# Patient Record
Sex: FEMALE | Race: WHITE | NOT HISPANIC OR LATINO | Employment: OTHER | URBAN - NONMETROPOLITAN AREA
[De-identification: names, ages, dates, MRNs, and addresses within clinical notes are randomized per-mention and may not be internally consistent; named-entity substitution may affect disease eponyms.]

---

## 2022-03-04 ENCOUNTER — OFFICE VISIT (OUTPATIENT)
Dept: FAMILY MEDICINE CLINIC | Age: 77
End: 2022-03-04

## 2022-03-04 ENCOUNTER — HOSPITAL ENCOUNTER (OUTPATIENT)
Dept: GENERAL RADIOLOGY | Facility: HOSPITAL | Age: 77
Discharge: HOME | End: 2022-03-04
Admitting: NURSE PRACTITIONER

## 2022-03-04 VITALS
HEIGHT: 64 IN | SYSTOLIC BLOOD PRESSURE: 133 MMHG | HEART RATE: 89 BPM | DIASTOLIC BLOOD PRESSURE: 82 MMHG | OXYGEN SATURATION: 98 % | TEMPERATURE: 98 F | BODY MASS INDEX: 28.54 KG/M2 | WEIGHT: 167.2 LBS

## 2022-03-04 DIAGNOSIS — M17.12 OSTEOARTHRITIS OF LEFT KNEE, UNSPECIFIED OSTEOARTHRITIS TYPE: ICD-10-CM

## 2022-03-04 DIAGNOSIS — M25.562 CHRONIC PAIN OF LEFT KNEE: ICD-10-CM

## 2022-03-04 DIAGNOSIS — G89.29 CHRONIC PAIN OF LEFT KNEE: ICD-10-CM

## 2022-03-04 DIAGNOSIS — M85.80 OSTEOPENIA, UNSPECIFIED LOCATION: ICD-10-CM

## 2022-03-04 DIAGNOSIS — F41.8 ANXIETY WITH DEPRESSION: ICD-10-CM

## 2022-03-04 DIAGNOSIS — I10 PRIMARY HYPERTENSION: Primary | ICD-10-CM

## 2022-03-04 DIAGNOSIS — M11.20 CHONDROCALCINOSIS: ICD-10-CM

## 2022-03-04 PROBLEM — K63.5 COLON POLYP: Status: ACTIVE | Noted: 2022-03-04

## 2022-03-04 PROBLEM — R01.1 CARDIAC MURMUR: Status: ACTIVE | Noted: 2022-03-04

## 2022-03-04 PROBLEM — E78.5 HYPERLIPIDEMIA: Status: ACTIVE | Noted: 2022-03-04

## 2022-03-04 PROBLEM — K44.9 HIATAL HERNIA: Status: ACTIVE | Noted: 2022-03-04

## 2022-03-04 PROBLEM — I35.0 AORTIC STENOSIS: Status: ACTIVE | Noted: 2022-03-04

## 2022-03-04 PROBLEM — H35.30 MACULAR DEGENERATION OF LEFT EYE: Status: ACTIVE | Noted: 2022-03-04

## 2022-03-04 PROBLEM — L98.9 PRECANCEROUS SKIN LESION: Status: ACTIVE | Noted: 2022-03-04

## 2022-03-04 PROCEDURE — 99204 OFFICE O/P NEW MOD 45 MIN: CPT | Performed by: NURSE PRACTITIONER

## 2022-03-04 PROCEDURE — 73560 X-RAY EXAM OF KNEE 1 OR 2: CPT

## 2022-03-04 RX ORDER — OXYBUTYNIN CHLORIDE 5 MG/1
5 TABLET ORAL DAILY
COMMUNITY
End: 2022-03-04 | Stop reason: SDDI

## 2022-03-04 RX ORDER — SIMVASTATIN 20 MG
TABLET ORAL
COMMUNITY
End: 2022-05-24 | Stop reason: DRUGHIGH

## 2022-03-04 RX ORDER — FLUOXETINE HYDROCHLORIDE 20 MG/1
20 CAPSULE ORAL DAILY
COMMUNITY
End: 2022-05-24 | Stop reason: SDUPTHER

## 2022-03-04 RX ORDER — HYDROXYZINE HYDROCHLORIDE 25 MG/1
12.5 TABLET, FILM COATED ORAL EVERY 8 HOURS PRN
COMMUNITY
End: 2022-05-24 | Stop reason: SDUPTHER

## 2022-03-04 RX ORDER — AMLODIPINE BESYLATE 5 MG/1
TABLET ORAL
COMMUNITY
End: 2022-05-24 | Stop reason: SDUPTHER

## 2022-03-04 RX ORDER — HYDROCHLOROTHIAZIDE 25 MG/1
25 TABLET ORAL DAILY
COMMUNITY
End: 2022-05-24 | Stop reason: SDUPTHER

## 2022-03-04 NOTE — PROGRESS NOTES
Chief Complaint  Pooja Chau presents to [facility] for Establish Care (BILATERAL KNEE ARTHROSCOPY) and Pain (L KNEE PAIN/SWELLING, PAIN SCALE: 8 WHEN IT HURTS)    Subjective          Recently moved from [facility]  Needs to establish care    Left knee with pain swelling/ pain with ambulation- pain is in superior aspect and lateral aspect of the knee  She did have arthroscopy in the knee 'years ago' in [place]  (records not available)    No injury to the knee has been using heat and ice only  No giving away of the knee,  No current use of brace.  She has never had her knee give away with her, mostly pain                Review of Systems      Allergies   Allergen Reactions   • Nsaids Swelling      History reviewed. No pertinent past medical history.  Current Outpatient Medications   Medication Sig Dispense Refill   • amLODIPine (NORVASC) 5 MG tablet amlodipine 5 mg tablet     • FLUoxetine (PROzac) 20 MG capsule fluoxetine 20 mg capsule     • hydroCHLOROthiazide (HYDRODIURIL) 25 MG tablet hydrochlorothiazide 25 mg tablet     • hydrOXYzine (ATARAX) 25 MG tablet hydroxyzine HCl 25 mg tablet     • simvastatin (ZOCOR) 20 MG tablet simvastatin 20 mg tablet       No current facility-administered medications for this visit.     Past Surgical History:   Procedure Laterality Date   • CERVICAL DISCECTOMY ANTERIOR  10/2018    c4-c7 with fusion   • HYSTERECTOMY      at age 39   with BLSO   • KNEE SURGERY Bilateral     2x/s in right   1 x in left    arthroscopy        Social History     Tobacco Use   • Smoking status: Never Smoker   • Smokeless tobacco: Never Used   Substance Use Topics   • Alcohol use: Yes     Comment: VERY RARELY/ONCE A MONTH   • Drug use: Not on file     Family History   Problem Relation Age of Onset   • Diabetes Mother         complications from DM   • Coronary artery disease Father    • Stroke Father    • Diabetes Maternal Grandmother    • Depression Sister    •  "Depression Brother      Health Maintenance Due   Topic Date Due   • DXA SCAN  Never done   • TDAP/TD VACCINES (1 - Tdap) Never done   • Pneumococcal Vaccine 65+ (1 of 1 - PPSV23) Never done   • ZOSTER VACCINE (2 of 2) 08/15/2020   • COVID-19 Vaccine (3 - Booster) 02/28/2022   • HEPATITIS C SCREENING  Never done   • ANNUAL WELLNESS VISIT  Never done   • LIPID PANEL  Never done      Immunization History   Administered Date(s) Administered   • COVID-19 (MODERNA) 1st, 2nd, 3rd Dose Only 09/21/2021, 09/30/2021   • Fluzone High Dose =>65 Years ([employer] ONLY) 09/16/2021   • Shingrix 06/20/2020        Objective     Vitals:    03/04/22 1048 03/04/22 1052   BP: 147/79 133/82   BP Location: Left arm Left arm   Patient Position: Sitting Sitting   Cuff Size: Adult Adult   Pulse: 98 89   Temp: 98 °F (36.7 °C)    TempSrc: Oral    SpO2: 98%    Weight: 75.8 kg (167 lb 3.2 oz)    Height: 162.6 cm (64\")      Body mass index is 28.7 kg/m².     Physical Exam  Constitutional:       General: She is not in acute distress.     Appearance: Normal appearance.   HENT:      Head: Normocephalic.   Cardiovascular:      Rate and Rhythm: Normal rate and regular rhythm.   Pulmonary:      Effort: Pulmonary effort is normal.      Breath sounds: Normal breath sounds.   Musculoskeletal:         General: Normal range of motion.      Left knee: Swelling and crepitus present. Tenderness present over the lateral joint line.   Neurological:      General: No focal deficit present.      Mental Status: She is alert and oriented to person, place, and time.   Psychiatric:         Mood and Affect: Mood normal.         Behavior: Behavior normal.           Result Review :     The following data was reviewed by: ANN Dash on 03/04/2022:  XR MARK KNEE 1 OR 2 VW L (03/04/2022)                          Assessment and Plan      Diagnoses and all orders for this visit:    1. Primary hypertension (Primary)  Assessment & Plan:  Hypertension is unchanged.  Continue " current treatment regimen.  Blood pressure will be reassessed at the next regular appointment.      2. Anxiety with depression  Assessment & Plan:  Patient's depression is single episode and is moderate without psychosis. Their depression is currently in full remission and the condition is unchanged. This will be reassessed at the next regular appointment. F/U as described:patient will continue current medication therapy.      3. Chronic pain of left knee  -     XR Knee 1 or 2 View Left; Future  -     Ambulatory Referral to Orthopedic Surgery    4. Chondrocalcinosis  -     Ambulatory Referral to Orthopedic Surgery    5. Osteoarthritis of left knee, unspecified osteoarthritis type  -     Ambulatory Referral to Orthopedic Surgery    6. Osteopenia, unspecified location  -     Ambulatory Referral to Orthopedic Surgery            Follow Up     Return if symptoms worsen or fail to improve, for pending records reveiw as to when need to follow up - 6 month minimum .

## 2022-03-04 NOTE — ASSESSMENT & PLAN NOTE
Patient's depression is single episode and is moderate without psychosis. Their depression is currently in full remission and the condition is unchanged. This will be reassessed at the next regular appointment. F/U as described:patient will continue current medication therapy.

## 2022-03-23 ENCOUNTER — OFFICE VISIT (OUTPATIENT)
Dept: CARDIOLOGY | Facility: CLINIC | Age: 77
End: 2022-03-23

## 2022-03-23 VITALS
HEIGHT: 64 IN | DIASTOLIC BLOOD PRESSURE: 76 MMHG | HEART RATE: 98 BPM | SYSTOLIC BLOOD PRESSURE: 138 MMHG | WEIGHT: 167 LBS | BODY MASS INDEX: 28.51 KG/M2

## 2022-03-23 DIAGNOSIS — I35.0 NONRHEUMATIC AORTIC VALVE STENOSIS: Primary | ICD-10-CM

## 2022-03-23 PROCEDURE — 93000 ELECTROCARDIOGRAM COMPLETE: CPT | Performed by: INTERNAL MEDICINE

## 2022-03-23 PROCEDURE — 99204 OFFICE O/P NEW MOD 45 MIN: CPT | Performed by: INTERNAL MEDICINE

## 2022-03-23 RX ORDER — CYCLOBENZAPRINE HCL 10 MG
1 TABLET ORAL EVERY 8 HOURS PRN
COMMUNITY
Start: 2021-12-18 | End: 2022-05-24 | Stop reason: SDUPTHER

## 2022-03-23 RX ORDER — OXYBUTYNIN CHLORIDE 5 MG/1
5 TABLET, EXTENDED RELEASE ORAL DAILY
COMMUNITY
End: 2022-05-24

## 2022-03-23 NOTE — PROGRESS NOTES
Pooja Chau  1945  Date of Office Visit: 03/23/22  Encounter Provider: Tanner Hester MD  Place of Service: [facility]      CHIEF COMPLAINT:  Aortic valve stenosis, severe. Asymptomatic  Essential hypertension  Hyperlipidemia    HISTORY OF PRESENT ILLNESS:  I had the pleasure of seeing Ms. Pooja Chau in consultation.  She is a 77-year-old female with a medical history of severe degenerative aortic valve stenosis that is asymptomatic, essential hypertension and hyperlipidemia who presents to me as a transfer of care.  She continues to maintain at least a moderate level of activity with no symptoms.  She denies any chest pain, dyspnea on exertion or presyncopal symptoms.  She has underwent transthoracic echocardiogram over the past few years and as of 2021 had severe aortic valve stenosis with a mean gradient of 41 mmHg across the aortic valve.  On my review of her catheterization and echocardiogram report this is confirmed.  She also has a normal left ventricular size and systolic function.  She had normal coronary arteries.    Review of Systems   Constitutional: Negative for fever and malaise/fatigue.   HENT: Negative for nosebleeds and sore throat.    Eyes: Negative for blurred vision and double vision.   Cardiovascular: Negative for chest pain, claudication, palpitations and syncope.   Respiratory: Negative for cough, shortness of breath and snoring.    Endocrine: Negative for cold intolerance, heat intolerance and polydipsia.   Skin: Negative for itching, poor wound healing and rash.   Musculoskeletal: Negative for joint pain, joint swelling, muscle weakness and myalgias.   Gastrointestinal: Negative for abdominal pain, melena, nausea and vomiting.   Neurological: Negative for light-headedness, loss of balance, seizures, vertigo and weakness.   Psychiatric/Behavioral: Negative for altered mental status and depression.       History reviewed. No  "pertinent past medical history.    The following portions of the patient's history were reviewed and updated as appropriate: Social history , Family history and Surgical history     Current Outpatient Medications on File Prior to Visit   Medication Sig Dispense Refill   • amLODIPine (NORVASC) 5 MG tablet amlodipine 5 mg tablet     • cyclobenzaprine (FLEXERIL) 10 MG tablet 1 tablet.     • FLUoxetine (PROzac) 20 MG capsule fluoxetine 20 mg capsule     • hydroCHLOROthiazide (HYDRODIURIL) 25 MG tablet hydrochlorothiazide 25 mg tablet     • hydrOXYzine (ATARAX) 25 MG tablet hydroxyzine HCl 25 mg tablet     • oxybutynin XL (DITROPAN-XL) 5 MG 24 hr tablet Take 5 mg by mouth Daily.     • simvastatin (ZOCOR) 20 MG tablet simvastatin 20 mg tablet       No current facility-administered medications on file prior to visit.       Allergies   Allergen Reactions   • Nsaids Swelling       Vitals:    03/23/22 1526   BP: 138/76   Pulse: 98   Weight: 75.8 kg (167 lb)   Height: 162.6 cm (64\")     Body mass index is 28.67 kg/m².   Constitutional:       Appearance: Well-developed.   Eyes:      General: No scleral icterus.     Conjunctiva/sclera: Conjunctivae normal.   HENT:      Head: Normocephalic and atraumatic.   Neck:      Thyroid: No thyromegaly.      Vascular: Normal carotid pulses. No carotid bruit, hepatojugular reflux or JVD.      Trachea: No tracheal deviation.   Pulmonary:      Effort: No respiratory distress.      Breath sounds: Normal breath sounds. No decreased breath sounds. No wheezing. No rhonchi. No rales.   Chest:      Chest wall: Not tender to palpatation.   Cardiovascular:      Normal rate. Regular rhythm.      Murmurs: There is a grade 3/6 mid to late systolic murmur.      No gallop.   Pulses:     Carotid: 2+ bilaterally.     Radial: 2+ bilaterally.     Femoral: 2+ bilaterally.     Dorsalis pedis: 2+ bilaterally.     Posterior tibial: 2+ bilaterally.  Abdominal:      General: Bowel sounds are normal. There is no " distension.      Palpations: Abdomen is soft.      Tenderness: There is no abdominal tenderness.   Musculoskeletal:         General: No deformity.      Cervical back: Normal range of motion and neck supple. Skin:     Findings: No erythema or rash.   Neurological:      Mental Status: Alert and oriented to person, place, and time.      Sensory: No sensory deficit.   Psychiatric:         Behavior: Behavior normal.              ECG 12 Lead    Date/Time: 3/23/2022 3:50 PM  Performed by: Tanner Hester MD  Authorized by: Tanner Hester MD   Comparison: compared with previous ECG from 12/11/2020  Similar to previous ECG  Rhythm: sinus rhythm  Ectopy: unifocal PVCs  Rate: normal  Conduction: right bundle branch block  QRS axis: normal                   DISCUSSION/SUMMARY  Very pleasant 77-year-old female with a medical history of severe degenerative aortic valve stenosis that is asymptomatic, hypertension and hyperlipidemia presents to me as a transfer of care.  She continues to maintain a least a moderate level of activity with an asymptomatic state.  She denies any chest pain or dyspnea on exertion.  She has had no presyncope or syncope.  Her blood pressure and heart rate are well controlled.    1.  Severe degenerative aortic valve stenosis: Preserved left ventricular ejection fraction and ventricular size.  This is asymptomatic.  -I have recommended continued monitoring.  My initial recommendation would be that she come back to see us in 6 months.  I would recommend repeating a transthoracic echocardiogram in 6 months when she comes in for her visit.  If she has critical AS at that time or evidence of left ventricular dysfunction or dilation we could consider valve replacement at that time.  -I encouraged her to call me if she has any symptoms of dyspnea, chest pain or lightheadedness.    2.  Essential hypertension: Reasonable control.  No change in current medical regimen of hydrochlorothiazide and  amlodipine.  No lower extremity edema reported.

## 2022-04-06 ENCOUNTER — OFFICE VISIT (OUTPATIENT)
Dept: ORTHOPEDIC SURGERY | Facility: CLINIC | Age: 77
End: 2022-04-06

## 2022-04-06 VITALS — WEIGHT: 166 LBS | HEIGHT: 64 IN | TEMPERATURE: 97.9 F | BODY MASS INDEX: 28.34 KG/M2

## 2022-04-06 DIAGNOSIS — M25.562 CHRONIC PAIN OF LEFT KNEE: Primary | ICD-10-CM

## 2022-04-06 DIAGNOSIS — G89.29 CHRONIC PAIN OF LEFT KNEE: Primary | ICD-10-CM

## 2022-04-06 DIAGNOSIS — F40.240 CLAUSTROPHOBIA: ICD-10-CM

## 2022-04-06 PROCEDURE — 99204 OFFICE O/P NEW MOD 45 MIN: CPT | Performed by: PHYSICIAN ASSISTANT

## 2022-04-06 RX ORDER — DIAZEPAM 5 MG/1
TABLET ORAL
Qty: 2 TABLET | Refills: 0 | Status: SHIPPED | OUTPATIENT
Start: 2022-04-06 | End: 2022-05-24

## 2022-04-06 RX ORDER — DIAZEPAM 5 MG/1
TABLET ORAL
COMMUNITY
End: 2022-04-06

## 2022-04-06 NOTE — PROGRESS NOTES
"Chief Complaint  Establish Care and Pain of the Left Knee    Subjective    History of Present Illness      Pooja Chau is a 77 y.o. female who presents to [facility] for complaint of Knee Pain:   Patient complains of left knee pain.   The pain began several years ago but has been worse and more constant in the 3 months.   The pain is located over patellar aspect.    She describes the symptoms as aching, dull and throbbing.   Symptoms improve with rest, heat, ice, cortisone injection.   The symptoms are worse with activity, stair climbing, walking.   The knee has given out or felt unstable.   She has had several prior knee arthroscopies over the last 10 years.      Objective   Vital Signs:   Temp 97.9 °F (36.6 °C)   Ht 162.6 cm (64\")   Wt 75.3 kg (166 lb)   BMI 28.49 kg/m²     Physical Exam  Vitals signs and nursing note reviewed.   Constitutional:       Appearance: Normal appearance.   Pulmonary:      Effort: Pulmonary effort is normal.   Skin:     General: Skin is warm and dry.      Capillary Refill: Capillary refill takes less than 2 seconds.   Neurological:      General: No focal deficit present.      Mental Status: He is alert and oriented to person, place, and time. Mental status is at baseline.   Psychiatric:         Mood and Affect: Mood normal.         Behavior: Behavior normal.         Thought Content: Thought content normal.         Judgment: Judgment normal.     Ortho Exam   LEFT knee  Positive patellar grind test with pain in the retropatellar region.    Positive for high Q-angle with patella tracking laterally in high grades of flexion.   Skin and soft tissues are swollen, consistent with inflammatory changes of the patellofemoral joint.    Positive for tenderness over the medial patellofemoral ligaments.   Quadriceps mechanism is well preserved.   Range of motion-Extension to Flexion: 0-120 degrees.  Negative apprehension sign.  Negative anterior and posterior " drawer. No medial or lateral instability noted.   Dorsalis pedis and posterior tibial artery pulses are palpable.   Common peroneal nerve function is well preserved.      Result Review :   Radiologic studies - see below for interpretation  LEFT knee xrays  nonweightbearing 2 views were performed at [facility] on 3/4/22. Images were independently viewed and interpreted by myself, my impression as follows:  · Moderate tricompartmental osteoarthritis with most prominent findings at the patellofemoral compartment.  There is chondrocalcinosis noted within the joint throughout.  Moderate joint effusion.        PROCEDURE  Procedures           Assessment   Assessment and Plan    Problem List Items Addressed This Visit        Musculoskeletal and Injuries    Chronic pain of left knee - Primary    Relevant Medications    diazePAM (Valium) 5 MG tablet    Other Relevant Orders    MRI Knee Left Without Contrast      Other Visit Diagnoses     Claustrophobia        Relevant Medications    diazePAM (Valium) 5 MG tablet          Follow Up   · Discussion of any imaging in detail. Discussion of orthopaedic goals.  · Risk, benefits, and merits of treatment alternatives reviewed with the patient. Treatment alternatives include: oral anti-inflammatories/NSAID, intra-articular steroid injection, intra-articular visco supplementation, surgery and further imaging/testing. She is thinking she wants to do more definitive treatment and proceed with the MRI and consider knee replacement surgery.  · Ice, heat, and/or modalities as beneficial  · To schedule MRI of left knee to evaluate the cartilage of the knee  · Patient is encouraged to call or return for any issues or concerns.  · Follow up will be based on when MRI has been performed  • Patient was given instructions and counseling regarding her condition or for health maintenance advice. Please see specific information pulled into the AVS if appropriate.     Eliseo Muñoz  UZIEL Fraga   Date of Encounter: 4/6/2022   Electronically signed by Eliseo Fraga PA-C, 04/06/22, 10:14 AM EDT.     EMR Dragon/Transcription disclaimer:  Much of this encounter note is an electronic transcription/translation of spoken language to printed text. The electronic translation of spoken language may permit erroneous, or at times, nonsensical words or phrases to be inadvertently transcribed; Although I have reviewed the note for such errors, some may still exist.

## 2022-05-06 ENCOUNTER — HOSPITAL ENCOUNTER (OUTPATIENT)
Dept: MRI IMAGING | Facility: HOSPITAL | Age: 77
Discharge: HOME | End: 2022-05-06
Admitting: PHYSICIAN ASSISTANT

## 2022-05-06 DIAGNOSIS — M25.562 CHRONIC PAIN OF LEFT KNEE: ICD-10-CM

## 2022-05-06 DIAGNOSIS — G89.29 CHRONIC PAIN OF LEFT KNEE: ICD-10-CM

## 2022-05-06 PROCEDURE — 73721 MRI JNT OF LWR EXTRE W/O DYE: CPT

## 2022-05-10 ENCOUNTER — TELEPHONE (OUTPATIENT)
Dept: ORTHOPEDIC SURGERY | Facility: CLINIC | Age: 77
End: 2022-05-10

## 2022-05-10 NOTE — TELEPHONE ENCOUNTER
LEFT VOICEMAIL FOR PATIENT THAT SHE HAS AN APPOINTMENT WITH KB MCDUFFIE PA-C ON 5/18/22 @ 8:15 AM TO GO OVER HER MRI RESULTS

## 2022-05-18 ENCOUNTER — OFFICE VISIT (OUTPATIENT)
Dept: ORTHOPEDIC SURGERY | Facility: CLINIC | Age: 77
End: 2022-05-18

## 2022-05-18 VITALS — WEIGHT: 164 LBS | HEIGHT: 64 IN | BODY MASS INDEX: 28 KG/M2

## 2022-05-18 DIAGNOSIS — M17.12 PRIMARY OSTEOARTHRITIS OF LEFT KNEE: Primary | ICD-10-CM

## 2022-05-18 PROCEDURE — 99214 OFFICE O/P EST MOD 30 MIN: CPT | Performed by: PHYSICIAN ASSISTANT

## 2022-05-18 RX ORDER — CEFAZOLIN SODIUM 2 G/100ML
2 INJECTION, SOLUTION INTRAVENOUS ONCE
Status: CANCELLED | OUTPATIENT
Start: 2022-10-03 | End: 2022-05-18

## 2022-05-18 NOTE — PROGRESS NOTES
"Chief Complaint  Follow-up, Edema, and Pain of the Right Knee    Subjective    History of Present Illness      Pooja Chau is a 77 y.o. female who presents to [facility] for follow up on left knee pain.   I initially saw her on 4/6/2022 for this complaint.  HPI from that visit is below:  The pain began several years ago but has been worse and more constant in the 3 months.   The pain is located over patellar aspect.    She describes the symptoms as aching, dull and throbbing.   Symptoms improve with rest, heat, ice, cortisone injection.   The symptoms are worse with activity, stair climbing, walking.   The knee has given out or felt unstable.   She has had several prior knee arthroscopies over the last 10 years.      Objective   Vital Signs:   Ht 162.6 cm (64\")   Wt 74.4 kg (164 lb)   BMI 28.15 kg/m²     Physical Exam  Vitals signs and nursing note reviewed.   Constitutional:       Appearance: Normal appearance.   Pulmonary:      Effort: Pulmonary effort is normal.   Skin:     General: Skin is warm and dry.      Capillary Refill: Capillary refill takes less than 2 seconds.   Neurological:      General: No focal deficit present.      Mental Status: He is alert and oriented to person, place, and time. Mental status is at baseline.   Psychiatric:         Mood and Affect: Mood normal.         Behavior: Behavior normal.         Thought Content: Thought content normal.         Judgment: Judgment normal.     Ortho Exam   LEFT knee  Positive patellar grind test with pain in the retropatellar region.    Positive for high Q-angle with patella tracking laterally in high grades of flexion.   Skin and soft tissues are swollen, consistent with inflammatory changes of the patellofemoral joint.    Positive for tenderness over the medial patellofemoral ligaments.   Quadriceps mechanism is well preserved.   Range of motion-Extension to Flexion: 0-120 degrees.  Negative apprehension " sign.  Negative anterior and posterior drawer. No medial or lateral instability noted.   Dorsalis pedis and posterior tibial artery pulses are palpable.   Common peroneal nerve function is well preserved.      Result Review :   Radiologic studies - see below for interpretation   Reviewed MRI report of left knee, performed at  [facility] on 5/6/22, summary of impression below:   · Compartment shows moderate thinning of the articular cartilage.  Medial femoral condyles consistent with area of full-thickness cartilage loss  · Body and posterior horn of medial meniscus have truncated appearance and horizontal oblique tear.  Body of medial meniscus is partially extruded from the joint.  · Lateral compartment shows mild thinning of the articular cartilage with lateral meniscus intact  · Dorsal patellar cartilage shows marked thinning and areas of marrow edema in the dorsal patellar are consistent with full-thickness cartilage loss      LEFT knee xrays  nonweightbearing 2 views were performed at [facility] on 3/4/22. Images were independently viewed and interpreted by myself, my impression as follows:  · Moderate tricompartmental osteoarthritis with most prominent findings at the patellofemoral compartment.  There is chondrocalcinosis noted within the joint throughout.  Moderate joint effusion.        PROCEDURE  Procedures           Assessment   Assessment and Plan    Diagnoses and all orders for this visit:    1. Primary osteoarthritis of left knee (Primary)  -     COVID PRE-OP / PRE-PROCEDURE SCREENING ORDER (NO ISOLATION) - Swab, Nasopharynx; Future  -     External [facility] Surgical/Procedural Request  -     Follow Anesthesia Guidelines / Protocol; Future  -     Obtain informed consent  -     Urinalysis With Culture If Indicated -; Future  -     Care Order / Instruction for all Female Patients: Clean Catch Urinalysis with culture if indicated if patient symptomatic:  dysuria, flank or lower abdominal pain, burning, urgency or frequency         Follow Up   · Discussion of any imaging in detail. Discussion of orthopaedic goals.  · Risk, benefits, and merits of treatment alternatives reviewed with the patient. Treatment alternatives include: oral anti-inflammatories/NSAID, intra-articular steroid injection, intra-articular visco supplementation, surgery. She would like to proceed with total knee arthroplasty of left knee.   The patient was seen today for preoperative discussion.  The patient has been tried on over-the-counter and prescription NSAID's despite the risks of anti-inflammatory bleeding, peptic ulcers and erosive gastritis with short term benefit only.  Braces have been prescribed for mechanical support.  Patient has been participating in an exercise program specifically targeting joint pain relief with limited benefit. Intraarticular injections have been used periodically with some but not complete relief of pain.  Ambulation aids have also been utilized.    · The details of the surgical procedure were explained including the location of probable incisions and a description of the likely hardware/grafts to be used. The patient understands the likely convalescence after surgery as well as the rehabilitation required.  Also, we have thoroughly discussed with the patient the risks, benefits and alternatives to surgery.  Risks include but are not limited to the risk of infection, joint stiffness, limited range of motion, wound healing problems, scar tissue build up, myocardial infarction, stroke, blood clots (including DVT and/or pulmonary embolus along with the risk of death) neurologic and/or vascular injury, limb length discrepancy, fracture, dislocation, nonunion, malunion, continued pain and need for further surgery including hardware failure requiring revision.    · Ice, heat, and/or modalities as beneficial  · Patient is encouraged to call or return for any issues or  concerns.  • Patient was given instructions and counseling regarding her condition or for health maintenance advice. Please see specific information pulled into the AVS if appropriate.     Eliseo Fraga PA-C   Date of Encounter: 5/18/2022   Electronically signed by Eliseo Fraga PA-C, 05/18/22, 8:57 AM EDT.     EMR Dragon/Transcription disclaimer:  Much of this encounter note is an electronic transcription/translation of spoken language to printed text. The electronic translation of spoken language may permit erroneous, or at times, nonsensical words or phrases to be inadvertently transcribed; Although I have reviewed the note for such errors, some may still exist.

## 2022-05-24 ENCOUNTER — OFFICE VISIT (OUTPATIENT)
Dept: FAMILY MEDICINE CLINIC | Age: 77
End: 2022-05-24

## 2022-05-24 VITALS
HEIGHT: 64 IN | HEART RATE: 76 BPM | WEIGHT: 168.6 LBS | OXYGEN SATURATION: 98 % | DIASTOLIC BLOOD PRESSURE: 72 MMHG | BODY MASS INDEX: 28.79 KG/M2 | SYSTOLIC BLOOD PRESSURE: 129 MMHG

## 2022-05-24 DIAGNOSIS — R73.09 ELEVATED GLUCOSE: ICD-10-CM

## 2022-05-24 DIAGNOSIS — I10 PRIMARY HYPERTENSION: ICD-10-CM

## 2022-05-24 DIAGNOSIS — E78.5 HYPERLIPIDEMIA, UNSPECIFIED HYPERLIPIDEMIA TYPE: ICD-10-CM

## 2022-05-24 DIAGNOSIS — R32 URINARY INCONTINENCE, UNSPECIFIED TYPE: ICD-10-CM

## 2022-05-24 DIAGNOSIS — G89.29 CHRONIC PAIN OF LEFT KNEE: ICD-10-CM

## 2022-05-24 DIAGNOSIS — M25.562 CHRONIC PAIN OF LEFT KNEE: ICD-10-CM

## 2022-05-24 DIAGNOSIS — F41.8 ANXIETY WITH DEPRESSION: ICD-10-CM

## 2022-05-24 DIAGNOSIS — R74.8 ELEVATED ALKALINE PHOSPHATASE LEVEL: ICD-10-CM

## 2022-05-24 DIAGNOSIS — H61.92 SKIN LESION OF LEFT EAR: ICD-10-CM

## 2022-05-24 DIAGNOSIS — F33.2 RECURRENT SEASONAL MAJOR DEPRESSION, SEVERE: Primary | ICD-10-CM

## 2022-05-24 PROCEDURE — 99214 OFFICE O/P EST MOD 30 MIN: CPT | Performed by: NURSE PRACTITIONER

## 2022-05-24 RX ORDER — AMLODIPINE BESYLATE 5 MG/1
5 TABLET ORAL DAILY
Qty: 90 TABLET | Refills: 1 | Status: SHIPPED | OUTPATIENT
Start: 2022-05-24 | End: 2023-01-23

## 2022-05-24 RX ORDER — AMLODIPINE BESYLATE 5 MG/1
TABLET ORAL
Status: CANCELLED | OUTPATIENT
Start: 2022-05-24

## 2022-05-24 RX ORDER — HYDROXYZINE HYDROCHLORIDE 25 MG/1
12.5 TABLET, FILM COATED ORAL EVERY 8 HOURS PRN
Qty: 90 TABLET | Refills: 1 | Status: SHIPPED | OUTPATIENT
Start: 2022-05-24

## 2022-05-24 RX ORDER — HYDROXYZINE HYDROCHLORIDE 25 MG/1
TABLET, FILM COATED ORAL
Status: CANCELLED | OUTPATIENT
Start: 2022-05-24

## 2022-05-24 RX ORDER — FLUOXETINE HYDROCHLORIDE 20 MG/1
20 CAPSULE ORAL DAILY
Qty: 90 CAPSULE | Refills: 1 | Status: SHIPPED | OUTPATIENT
Start: 2022-05-24 | End: 2023-01-16

## 2022-05-24 RX ORDER — CYCLOBENZAPRINE HCL 10 MG
10 TABLET ORAL EVERY 8 HOURS PRN
Qty: 90 TABLET | Refills: 1 | Status: SHIPPED | OUTPATIENT
Start: 2022-05-24

## 2022-05-24 RX ORDER — SIMVASTATIN 20 MG
TABLET ORAL
Qty: 90 TABLET | OUTPATIENT
Start: 2022-05-24

## 2022-05-24 RX ORDER — HYDROCHLOROTHIAZIDE 25 MG/1
25 TABLET ORAL DAILY
Qty: 90 TABLET | Refills: 1 | Status: SHIPPED | OUTPATIENT
Start: 2022-05-24 | End: 2022-12-15

## 2022-05-24 RX ORDER — HYDROCHLOROTHIAZIDE 25 MG/1
TABLET ORAL
Status: CANCELLED | OUTPATIENT
Start: 2022-05-24

## 2022-05-24 RX ORDER — FLUOXETINE HYDROCHLORIDE 40 MG/1
40 CAPSULE ORAL DAILY PRN
COMMUNITY
Start: 2022-05-24

## 2022-05-24 RX ORDER — SIMVASTATIN 40 MG
40 TABLET ORAL NIGHTLY
COMMUNITY
Start: 2022-05-24 | End: 2022-05-24 | Stop reason: SDUPTHER

## 2022-05-24 RX ORDER — SIMVASTATIN 40 MG
40 TABLET ORAL NIGHTLY
Qty: 90 TABLET | Refills: 1 | Status: SHIPPED | OUTPATIENT
Start: 2022-05-24 | End: 2022-07-12 | Stop reason: SDUPTHER

## 2022-05-24 NOTE — TELEPHONE ENCOUNTER
Caller: Pooja Chau    Relationship: Self    Best call back number: [phone]    Requested Prescriptions:     Requested Prescriptions     Pending Prescriptions Disp Refills   • amLODIPine (NORVASC) 5 MG tablet     • hydroCHLOROthiazide (HYDRODIURIL) 25 MG tablet     • simvastatin (ZOCOR) 20 MG tablet 90 tablet    • hydrOXYzine (ATARAX) 25 MG tablet          Pharmacy where request should be sent: [facility] #[id number] - [place], [place] - [address] AT [facility] [address] - [phone] [facility] [phone] FX         Does the patient have less than a 3 day supply:  [x] Yes  [] No    Alejandrina Lucas Rep   05/24/22 11:41 EDT

## 2022-05-24 NOTE — TELEPHONE ENCOUNTER
Caller: Pooja Chau    Relationship: Self    Best call back number: [phone]    What medication are you requesting: BLADDER CONTROL MEDICATION       What are your current symptoms: FREQUENT URINATION, NO CONTROL, 4 TIMES PER DAY SHE SOAKS HERSELF    How long have you been experiencing symptoms: AWHILE MORE FREQUENTLY LATELY    Have you had these symptoms before:    [x] Yes  [] No    Have you been treated for these symptoms before:   [x] Yes  [] No    If a prescription is needed, what is your preferred pharmacy and phone number: [facility] #[id number] - [place], [place] - [address] AT [facility] [address]  - [phone]  - [phone] FX

## 2022-05-24 NOTE — PROGRESS NOTES
Assessment and Plan    Diagnoses and all orders for this visit:    1. Recurrent seasonal major depression, severe (HCC) (Primary)  Comments:  Continue current treatment as recommended refills provided today follow-up in 6 months  Orders:  -     FLUoxetine (PROzac) 20 MG capsule; Take 1 capsule by mouth Daily. To take for seasonal depression. Once season depression has subsided, pt goes back down to 20mg daily.  Dispense: 90 capsule; Refill: 1    2. Primary hypertension  Comments:  Continue current treatment, blood pressure well controlled today follow-up in 6 months  Orders:  -     hydroCHLOROthiazide (HYDRODIURIL) 25 MG tablet; Take 1 tablet by mouth Daily.  Dispense: 90 tablet; Refill: 1  -     amLODIPine (NORVASC) 5 MG tablet; Take 1 tablet by mouth Daily.  Dispense: 90 tablet; Refill: 1  -     Comprehensive metabolic panel; Future    3. Anxiety with depression  Comments:  Continue current treatment at next scheduled appointment and as needed  Orders:  -     hydrOXYzine (ATARAX) 25 MG tablet; Take 0.5 tablets by mouth Every 8 (Eight) Hours As Needed for Anxiety.  Dispense: 90 tablet; Refill: 1    4. Chronic pain of left knee  Comments:  Follow-up with orthopedics as recommended  Orders:  -     cyclobenzaprine (FLEXERIL) 10 MG tablet; Take 1 tablet by mouth Every 8 (Eight) Hours As Needed for Muscle Spasms.  Dispense: 90 tablet; Refill: 1    5. Hyperlipidemia, unspecified hyperlipidemia type  Comments:  Continue current treatment follow-up in 6 months labs with next visit  Orders:  -     simvastatin (ZOCOR) 40 MG tablet; Take 1 tablet by mouth Every Night.  Dispense: 90 tablet; Refill: 1  -     Lipid panel; Future    6. Skin lesion of left ear  Comments:  I advised her to keep a watch on this area in her ear and we will have her follow-up with dermatology after her knee surgery for skin check    7. Urinary incontinence, unspecified type  Comments:  We discussed bladder training, scheduled bathroom, however she  will pursue additional recommendations with urology after knee surgery        Follow Up   Return in about 6 months (around 11/24/2022) for Recheck, Medicare Wellness.    Chief Complaint  Pooja Chau presents to [facility] for Hypertension, Hyperlipidemia, Anxiety, and ADVANCE DIRECTIVE (PT STATES SHE HAS AN ADVANCE DIRECTIVE. WE DO NOT HAVE ON FILE. ASKED PT TO BRING TO NEXT APPT.)    Subjective          Pooja presents for follow up on hypertension.  Compliance with medication is reported as good   Check of BP at home reported as well controlled.  No new concerns or problems to report.  Taking amlodipine and HCTZ    Pooja is here for follow up on depression.  She is reporting that Her medication is working well without side effects.  Current treatment includes prozac 20mg  But will increase 40mg in the summer as she has seasonal depression which usually flares up in the summer months.  She does take hydroxyzine as needed for panic attacks and anxiety.    Regarding her urinary incontinence, Ms. Chau reports that she has stopped taking all of her bladder medication due to side effects.  She has been on oxybutynin as well as Myrbetriq.  She did have an evaluation in the past by urology however this has been several years back.  She was told at some point that she did have slight bladder prolapse and had offered bladder sling however she declined at that time.  She reports that she is having to wear several undergarments to help with her incontinence as the urgency and overflow incontinence is gotten extreme.  She does drink green tea regularly.  She is interested in pursuing additional treatment, recommendations after her knee surgery.    Pooja is pending the surgery with Dr. Salcedo in the upcoming months.          Review of Systems    Objective     Vitals:    05/24/22 1539   BP: 129/72   Pulse: 76   SpO2: 98%   Weight: 76.5 kg (168 lb 9.6 oz)   Height: 162.6 cm  "(64.02\")     Body mass index is 28.93 kg/m².     Physical Exam  Constitutional:       General: She is not in acute distress.     Appearance: Normal appearance.   HENT:      Head: Normocephalic.   Cardiovascular:      Rate and Rhythm: Normal rate and regular rhythm.   Pulmonary:      Effort: Pulmonary effort is normal.      Breath sounds: Normal breath sounds.   Musculoskeletal:         General: Normal range of motion.   Neurological:      General: No focal deficit present.      Mental Status: She is alert and oriented to person, place, and time.   Psychiatric:         Mood and Affect: Mood normal.         Behavior: Behavior normal.         Result Review :                    Allergies   Allergen Reactions   • Nsaids Swelling   • Codeine Nausea Only   • Hydrocodone-Acetaminophen Other (See Comments)      Past Medical History:   Diagnosis Date   • Aortic stenosis    • Back pain    • Bronchitis    • Claustrophobia    • Heart disease    • Hypertension    • Pneumonia      Current Outpatient Medications   Medication Sig Dispense Refill   • amLODIPine (NORVASC) 5 MG tablet Take 1 tablet by mouth Daily. 90 tablet 1   • cyclobenzaprine (FLEXERIL) 10 MG tablet Take 1 tablet by mouth Every 8 (Eight) Hours As Needed for Muscle Spasms. 90 tablet 1   • FLUoxetine (PROzac) 20 MG capsule Take 1 capsule by mouth Daily. To take for seasonal depression. Once season depression has subsided, pt goes back down to 20mg daily. 90 capsule 1   • FLUoxetine (PROzac) 40 MG capsule Take 40 mg by mouth Daily As Needed. To take for seasonal depression. Once season depression has subsided, pt goes back down to 20mg daily.     • hydroCHLOROthiazide (HYDRODIURIL) 25 MG tablet Take 1 tablet by mouth Daily. 90 tablet 1   • hydrOXYzine (ATARAX) 25 MG tablet Take 0.5 tablets by mouth Every 8 (Eight) Hours As Needed for Anxiety. 90 tablet 1   • simvastatin (ZOCOR) 40 MG tablet Take 1 tablet by mouth Every Night. 90 tablet 1     No current " facility-administered medications for this visit.     Past Surgical History:   Procedure Laterality Date   • CERVICAL DISCECTOMY ANTERIOR  10/2018    c4-c7 with fusion   • HYSTERECTOMY      at age 39   with BLSO   • KNEE SURGERY Bilateral     2x/s in right   1 x in left    arthroscopy     • SHOULDER SURGERY Left       Social History     Tobacco Use   • Smoking status: Never Smoker   • Smokeless tobacco: Never Used   Vaping Use   • Vaping Use: Never used   Substance Use Topics   • Alcohol use: Yes     Comment: VERY RARELY/ONCE A MONTH/socially   • Drug use: Never     Family History   Problem Relation Age of Onset   • Diabetes Mother         complications from DM   • Coronary artery disease Father    • Stroke Father    • Diabetes Maternal Grandmother    • Depression Sister    • Depression Brother      Health Maintenance Due   Topic Date Due   • DXA SCAN  Never done   • TDAP/TD VACCINES (1 - Tdap) Never done   • Pneumococcal Vaccine 65+ (1 - PCV) Never done   • ZOSTER VACCINE (2 of 2) 08/15/2020   • HEPATITIS C SCREENING  Never done   • ANNUAL WELLNESS VISIT  Never done   • LIPID PANEL  Never done      Immunization History   Administered Date(s) Administered   • COVID-19 (MODERNA) 1st, 2nd, 3rd Dose Only 09/21/2021, 09/30/2021   • COVID-19 (UNSPECIFIED) 03/18/2022   • Fluzone High Dose =>65 Years ([employer]ax[employer] ONLY) 09/16/2021   • Shingrix 06/20/2020

## 2022-05-25 ENCOUNTER — LAB (OUTPATIENT)
Dept: LAB | Facility: HOSPITAL | Age: 77
End: 2022-05-25

## 2022-05-25 DIAGNOSIS — M17.12 PRIMARY OSTEOARTHRITIS OF LEFT KNEE: ICD-10-CM

## 2022-05-25 DIAGNOSIS — E78.5 HYPERLIPIDEMIA, UNSPECIFIED HYPERLIPIDEMIA TYPE: ICD-10-CM

## 2022-05-25 DIAGNOSIS — I10 PRIMARY HYPERTENSION: ICD-10-CM

## 2022-05-25 LAB
ALBUMIN SERPL-MCNC: 4 G/DL (ref 3.5–5.2)
ALBUMIN/GLOB SERPL: 1.4 G/DL
ALP SERPL-CCNC: 133 U/L (ref 39–117)
ALT SERPL W P-5'-P-CCNC: 19 U/L (ref 1–33)
ANION GAP SERPL CALCULATED.3IONS-SCNC: 9 MMOL/L (ref 5–15)
AST SERPL-CCNC: 21 U/L (ref 1–32)
BACTERIA UR QL AUTO: ABNORMAL /HPF
BILIRUB SERPL-MCNC: 0.3 MG/DL (ref 0–1.2)
BILIRUB UR QL STRIP: NEGATIVE
BUN SERPL-MCNC: 13 MG/DL (ref 8–23)
BUN/CREAT SERPL: 16.7 (ref 7–25)
CALCIUM SPEC-SCNC: 9.8 MG/DL (ref 8.6–10.5)
CHLORIDE SERPL-SCNC: 103 MMOL/L (ref 98–107)
CHOLEST SERPL-MCNC: 184 MG/DL (ref 0–200)
CLARITY UR: CLEAR
CO2 SERPL-SCNC: 30 MMOL/L (ref 22–29)
COLOR UR: YELLOW
CREAT SERPL-MCNC: 0.78 MG/DL (ref 0.57–1)
EGFRCR SERPLBLD CKD-EPI 2021: 77.9 ML/MIN/1.73
GLOBULIN UR ELPH-MCNC: 2.9 GM/DL
GLUCOSE SERPL-MCNC: 122 MG/DL (ref 65–99)
GLUCOSE UR STRIP-MCNC: NEGATIVE MG/DL
HDLC SERPL-MCNC: 67 MG/DL (ref 40–60)
HGB UR QL STRIP.AUTO: ABNORMAL
KETONES UR QL STRIP: NEGATIVE
LDLC SERPL CALC-MCNC: 105 MG/DL (ref 0–100)
LDLC/HDLC SERPL: 1.56 {RATIO}
LEUKOCYTE ESTERASE UR QL STRIP.AUTO: NEGATIVE
MUCOUS THREADS URNS QL MICRO: ABNORMAL /HPF
NITRITE UR QL STRIP: NEGATIVE
PH UR STRIP.AUTO: 6 [PH] (ref 5–8)
POTASSIUM SERPL-SCNC: 3.8 MMOL/L (ref 3.5–5.2)
PROT SERPL-MCNC: 6.9 G/DL (ref 6–8.5)
PROT UR QL STRIP: NEGATIVE
RBC # UR STRIP: ABNORMAL /HPF
REF LAB TEST METHOD: ABNORMAL
SODIUM SERPL-SCNC: 142 MMOL/L (ref 136–145)
SP GR UR STRIP: 1.02 (ref 1–1.03)
SQUAMOUS #/AREA URNS HPF: ABNORMAL /HPF
TRIGL SERPL-MCNC: 63 MG/DL (ref 0–150)
UROBILINOGEN UR QL STRIP: ABNORMAL
VLDLC SERPL-MCNC: 12 MG/DL (ref 5–40)
WBC # UR STRIP: ABNORMAL /HPF

## 2022-05-25 PROCEDURE — 80053 COMPREHEN METABOLIC PANEL: CPT

## 2022-05-25 PROCEDURE — 80061 LIPID PANEL: CPT

## 2022-05-25 PROCEDURE — 36415 COLL VENOUS BLD VENIPUNCTURE: CPT

## 2022-05-25 PROCEDURE — 81001 URINALYSIS AUTO W/SCOPE: CPT

## 2022-07-07 ENCOUNTER — TELEPHONE (OUTPATIENT)
Dept: FAMILY MEDICINE CLINIC | Age: 77
End: 2022-07-07

## 2022-07-07 NOTE — TELEPHONE ENCOUNTER
Caller: YASH    Relationship: Other    Best call back number: [phone]    What form or medical record are you requesting: MEDICAL RECORDS- EKG FROM 3 MONTHS AGO    Who is requesting this form or medical record from you: [facility]    How would you like to receive the form or medical records (pick-up, mail, fax): FAX  If fax, what is the fax number: [phone] ATTN: YASH      Timeframe paperwork needed: ASAP    Additional notes: YASH FROM [facility] NEEDS MEDICAL RECORDS INCLUDING MOST RECENT ECHOCARDIOGRAM, NOTES REGARDING AORTIC STENOSIS, AND ANY RECORDS FROM DR. GOMEZ FAXED ASAP

## 2022-07-12 DIAGNOSIS — R32 URINARY INCONTINENCE, UNSPECIFIED TYPE: Primary | ICD-10-CM

## 2022-07-12 DIAGNOSIS — E78.5 HYPERLIPIDEMIA, UNSPECIFIED HYPERLIPIDEMIA TYPE: ICD-10-CM

## 2022-07-12 RX ORDER — SIMVASTATIN 40 MG
40 TABLET ORAL NIGHTLY
Qty: 90 TABLET | Refills: 1 | Status: SHIPPED | OUTPATIENT
Start: 2022-07-12 | End: 2023-01-23

## 2022-07-12 NOTE — TELEPHONE ENCOUNTER
Caller: Pooja Chau    Relationship: Self    Best call back number:[phone]     Requested Prescriptions:   Requested Prescriptions     Pending Prescriptions Disp Refills   • simvastatin (ZOCOR) 40 MG tablet 90 tablet 1     Sig: Take 1 tablet by mouth Every Night.        Pharmacy where request should be sent: [id number] - [facility] [address] AT [facility] [address] [facility] - [phone]  - [phone] FX     Additional details provided by patient: COMPLETELY OUT    Does the patient have less than a 3 day supply:  [x] Yes  [] No    Alejandrina BERGER Rep   07/12/22 10:11 EDT

## 2022-07-12 NOTE — TELEPHONE ENCOUNTER
Caller: Pooja Chau    Relationship: Self    Best call back number: [phone]     What medication are you requesting:     What are your current symptoms: SUDDEN URINATION ISSUES    How long have you been experiencing symptoms: 3 OR 4 YEARS    Have you had these symptoms before:    [] Yes  [] No    Have you been treated for these symptoms before:   [] Yes  [] No    If a prescription is needed, what is your preferred pharmacy and phone number: [facility] #[id number] - [place], [place] - [address] AT [facility] [address]  - [phone]  - [phone] FX     Additional notes: PATIENT STATES SHE HAS DISCUSSED THIS WITH TATIANA SAMS AT LAST APPOINTMENT

## 2022-07-12 NOTE — TELEPHONE ENCOUNTER
Per OV note looks like you were going to try to refer to urology but pt said she thought she heard to try somethingelse please advise

## 2022-07-20 ENCOUNTER — TELEPHONE (OUTPATIENT)
Dept: CARDIOLOGY | Facility: CLINIC | Age: 77
End: 2022-07-20

## 2022-07-20 NOTE — TELEPHONE ENCOUNTER
Regi with [facility] called. She is wanting to know if the pt is clear to have left total knee replacemet on 8/122 with Dr. Berhane Salcedo.    We saw her last on 3/23/22.  Please advise.    Thank,  Rachael Deluna  P#[phone]  F#[phone]

## 2022-07-26 NOTE — TELEPHONE ENCOUNTER
Faxed letter with below instructions to Dr. Berhane Salcedo and Latha at [facility].    Rachael

## 2022-07-26 NOTE — TELEPHONE ENCOUNTER
"Yes   Patient has severe asymptomatic AS.   Please write this  \" Patient has severe asymptomatic aortic valve stenosis.  No intervention indicated at this time.  Recommend moving forward with surgical intervention.  Consider cardiac anesthesia for procedure.\"  "

## 2022-09-26 ENCOUNTER — OFFICE VISIT (OUTPATIENT)
Dept: UROLOGY | Facility: CLINIC | Age: 77
End: 2022-09-26

## 2022-09-26 VITALS — RESPIRATION RATE: 16 BRPM | BODY MASS INDEX: 28.17 KG/M2 | WEIGHT: 165 LBS | HEIGHT: 64 IN

## 2022-09-26 DIAGNOSIS — R32 URINARY INCONTINENCE, UNSPECIFIED TYPE: Primary | ICD-10-CM

## 2022-09-26 PROBLEM — M79.643 HAND PAIN: Status: ACTIVE | Noted: 2017-11-30

## 2022-09-26 PROBLEM — M54.2 CHRONIC NECK PAIN: Status: ACTIVE | Noted: 2018-06-12

## 2022-09-26 PROBLEM — S83.209A TEAR OF MENISCUS OF KNEE: Status: ACTIVE | Noted: 2017-12-07

## 2022-09-26 PROBLEM — M17.12 OSTEOARTHRITIS OF LEFT KNEE: Status: ACTIVE | Noted: 2017-12-07

## 2022-09-26 PROBLEM — M54.12 CERVICAL RADICULOPATHY: Status: ACTIVE | Noted: 2018-03-20

## 2022-09-26 PROBLEM — G89.29 CHRONIC NECK PAIN: Status: ACTIVE | Noted: 2018-06-12

## 2022-09-26 PROBLEM — N39.3 FEMALE STRESS INCONTINENCE: Status: ACTIVE | Noted: 2017-04-20

## 2022-09-26 PROBLEM — M62.830 SPASM OF BACK MUSCLES: Status: ACTIVE | Noted: 2018-06-07

## 2022-09-26 PROBLEM — M43.10 SPONDYLOLISTHESIS: Status: ACTIVE | Noted: 2018-06-12

## 2022-09-26 PROBLEM — IMO0002 PREOPERATIVE STATE: Status: ACTIVE | Noted: 2018-08-22

## 2022-09-26 PROBLEM — M19.019 DISORDER OF ACROMIOCLAVICULAR JOINT: Status: ACTIVE | Noted: 2018-03-20

## 2022-09-26 PROBLEM — M65.30 ACQUIRED TRIGGER FINGER: Status: ACTIVE | Noted: 2017-07-07

## 2022-09-26 PROBLEM — M19.041 OSTEOARTHRITIS OF RIGHT HAND: Status: ACTIVE | Noted: 2017-11-30

## 2022-09-26 LAB
BILIRUB BLD-MCNC: NEGATIVE MG/DL
CLARITY, POC: CLEAR
COLOR UR: YELLOW
EXPIRATION DATE: ABNORMAL
GLUCOSE UR STRIP-MCNC: NEGATIVE MG/DL
KETONES UR QL: NEGATIVE
LEUKOCYTE EST, POC: NEGATIVE
Lab: ABNORMAL
NITRITE UR-MCNC: NEGATIVE MG/ML
PH UR: 6.5 [PH] (ref 5–8)
PROT UR STRIP-MCNC: NEGATIVE MG/DL
RBC # UR STRIP: ABNORMAL /UL
SP GR UR: 1.02 (ref 1–1.03)
URINE VOLUME: NORMAL
UROBILINOGEN UR QL: ABNORMAL

## 2022-09-26 PROCEDURE — 51798 US URINE CAPACITY MEASURE: CPT | Performed by: NURSE PRACTITIONER

## 2022-09-26 PROCEDURE — 81003 URINALYSIS AUTO W/O SCOPE: CPT | Performed by: NURSE PRACTITIONER

## 2022-09-26 PROCEDURE — 99213 OFFICE O/P EST LOW 20 MIN: CPT | Performed by: NURSE PRACTITIONER

## 2022-09-26 NOTE — PROGRESS NOTES
"Chief Complaint: Urinary Incontinence    Subjective         History of Present Illness  Pooja Chau is a 77 y.o. female presents to [facility] to be seen for urinary incontinence.     She states a \"years\" long HX of urinary leakage.     She states that if she has the urge to void she will have to run to make it on time and most of the time she has already leaked.    She has urinary frequency as well.     She is wearing double pads and having to change them 10 times a day.     Nocturia x 3     She is drinking  mostly decaffeinated green tea and coffee regular 2-3 small cups in the AM.     She has been on oxybutynin and states this worked well but gave her dry mouth.     She was on myrbetriq as well and this caused dry mouth and was ineffective.     She denies stress incontinence.     She states that she has seen two different physicians for this over the years and has had a UDS approx 15 years ago.      No anticoagulant use.       Objective     Past Medical History:   Diagnosis Date   • Aortic stenosis    • Back pain    • Bronchitis    • Claustrophobia    • Heart disease    • Hypertension    • Pneumonia        Past Surgical History:   Procedure Laterality Date   • CERVICAL DISCECTOMY ANTERIOR  10/2018    c4-c7 with fusion   • HYSTERECTOMY      at age 39   with BLSO   • KNEE SURGERY Bilateral     2x/s in right   1 x in left    arthroscopy     • SHOULDER SURGERY Left          Current Outpatient Medications:   •  amLODIPine (NORVASC) 5 MG tablet, Take 1 tablet by mouth Daily., Disp: 90 tablet, Rfl: 1  •  cyclobenzaprine (FLEXERIL) 10 MG tablet, Take 1 tablet by mouth Every 8 (Eight) Hours As Needed for Muscle Spasms., Disp: 90 tablet, Rfl: 1  •  FLUoxetine (PROzac) 20 MG capsule, Take 1 capsule by mouth Daily. To take for seasonal depression. Once season depression has subsided, pt goes back down to 20mg daily., Disp: 90 capsule, Rfl: 1  •  FLUoxetine (PROzac) 40 MG capsule, Take 40 mg " "by mouth Daily As Needed. To take for seasonal depression. Once season depression has subsided, pt goes back down to 20mg daily., Disp: , Rfl:   •  hydroCHLOROthiazide (HYDRODIURIL) 25 MG tablet, Take 1 tablet by mouth Daily., Disp: 90 tablet, Rfl: 1  •  hydrOXYzine (ATARAX) 25 MG tablet, Take 0.5 tablets by mouth Every 8 (Eight) Hours As Needed for Anxiety., Disp: 90 tablet, Rfl: 1  •  simvastatin (ZOCOR) 40 MG tablet, Take 1 tablet by mouth Every Night., Disp: 90 tablet, Rfl: 1  •  Vibegron 75 MG tablet, Take 1 tablet by mouth Daily (Monday-Friday)., Disp: 90 tablet, Rfl: 3    Allergies   Allergen Reactions   • Nsaids Swelling   • Codeine Nausea Only   • Hydrocodone-Acetaminophen Other (See Comments)        Family History   Problem Relation Age of Onset   • Diabetes Mother         complications from DM   • Coronary artery disease Father    • Stroke Father    • Diabetes Maternal Grandmother    • Depression Sister    • Depression Brother        Social History     Socioeconomic History   • Marital status:    • Number of children: 2   Tobacco Use   • Smoking status: Never Smoker   • Smokeless tobacco: Never Used   Vaping Use   • Vaping Use: Never used   Substance and Sexual Activity   • Alcohol use: Yes     Comment: VERY RARELY/ONCE A MONTH/socially   • Drug use: Never   • Sexual activity: Defer       Vital Signs:   Resp 16   Ht 162.6 cm (64\")   Wt 74.8 kg (165 lb)   BMI 28.32 kg/m²      Physical Exam     Result Review :   The following data was reviewed by: ANN Leos on 09/26/2022:  Results for orders placed or performed in visit on 09/26/22   Bladder Scan   Result Value Ref Range    Urine Volume 13ml    POC Urinalysis Dipstick, Automated    Specimen: Urine   Result Value Ref Range    Color Yellow Yellow, Straw, Dark Yellow, Yeimi    Clarity, UA Clear Clear    Specific Gravity  1.025 1.005 - 1.030    pH, Urine 6.5 5.0 - 8.0    Leukocytes Negative Negative    Nitrite, UA Negative Negative    " Protein, POC Negative Negative mg/dL    Glucose, UA Negative Negative mg/dL    Ketones, UA Negative Negative    Urobilinogen, UA 0.2 E.U./dL Normal, 0.2 E.U./dL    Bilirubin Negative Negative    Blood, UA Trace (A) Negative    Lot Number [id number]     Expiration Date 82,023        Bladder Scan interpretation 09/26/2022    Estimation of residual urine via BVI 3000 Verathon Bladder Scan  MA/nurse performing: Meenakshi RODRIGUEZ MA   Residual Urine: 13 ml  Indication: Urinary incontinence, unspecified type   Position: Supine  Examination: Incremental scanning of the suprapubic area using 2.0 MHz transducer using copious amounts of acoustic gel.   Findings: An anechoic area was demonstrated which represented the bladder, with measurement of residual urine as noted. I inspected this myself. In that the residual urine was insignificant, refer to plan for treatment and plan necessary at this time.           Procedures        Assessment and Plan    Diagnoses and all orders for this visit:    1. Urinary incontinence, unspecified type (Primary)  -     Bladder Scan  -     POC Urinalysis Dipstick, Automated  -     Vibegron 75 MG tablet; Take 1 tablet by mouth Daily (Monday-Friday).  Dispense: 90 tablet; Refill: 3      We discussed options for treatment and the patient is agreeable to try gemtesa to see of this will help alleviate her urge incontinence.    We will also get her scheduled for a UDS to evaluate her bladder symptoms and ensure we are formulating an appropriate POC.         I spent 15 minutes caring for Pooja on this date of service. This time includes time spent by me in the following activities:reviewing tests, obtaining and/or reviewing a separately obtained history, performing a medically appropriate examination and/or evaluation , counseling and educating the patient/family/caregiver, ordering medications, tests, or procedures, and documenting information in the medical record  Follow Up   Return for UDS andf f/u 1 week  after with dr hollis .  Patient was given instructions and counseling regarding her condition or for health maintenance advice. Please see specific information pulled into the AVS if appropriate.         This document has been electronically signed by ANN Leos  September 26, 2022 15:16 EDT

## 2022-09-27 ENCOUNTER — TELEPHONE (OUTPATIENT)
Dept: UROLOGY | Facility: CLINIC | Age: 77
End: 2022-09-27

## 2022-09-27 RX ORDER — VIBEGRON 75 MG/1
75 TABLET, FILM COATED ORAL DAILY
Qty: 14 TABLET | Refills: 0 | Status: SHIPPED | OUTPATIENT
Start: 2022-09-27

## 2022-10-03 ENCOUNTER — HOSPITAL ENCOUNTER (OUTPATIENT)
Dept: CARDIOLOGY | Facility: HOSPITAL | Age: 77
Discharge: HOME | End: 2022-10-03
Admitting: INTERNAL MEDICINE

## 2022-10-03 ENCOUNTER — OFFICE VISIT (OUTPATIENT)
Dept: CARDIOLOGY | Facility: CLINIC | Age: 77
End: 2022-10-03

## 2022-10-03 VITALS
HEIGHT: 64 IN | DIASTOLIC BLOOD PRESSURE: 80 MMHG | WEIGHT: 165 LBS | OXYGEN SATURATION: 97 % | SYSTOLIC BLOOD PRESSURE: 148 MMHG | BODY MASS INDEX: 28.17 KG/M2 | HEART RATE: 93 BPM

## 2022-10-03 VITALS
SYSTOLIC BLOOD PRESSURE: 136 MMHG | HEIGHT: 64 IN | DIASTOLIC BLOOD PRESSURE: 80 MMHG | BODY MASS INDEX: 28.58 KG/M2 | WEIGHT: 167.4 LBS | HEART RATE: 92 BPM

## 2022-10-03 DIAGNOSIS — E78.2 MIXED HYPERLIPIDEMIA: ICD-10-CM

## 2022-10-03 DIAGNOSIS — I35.0 NONRHEUMATIC AORTIC VALVE STENOSIS: Primary | ICD-10-CM

## 2022-10-03 DIAGNOSIS — I35.0 NONRHEUMATIC AORTIC VALVE STENOSIS: ICD-10-CM

## 2022-10-03 LAB
AORTIC ARCH: 2 CM
AORTIC DIMENSIONLESS INDEX: 0.2 (DI)
ASCENDING AORTA: 3.6 CM
BH CV ECHO MEAS - ACS: 0.93 CM
BH CV ECHO MEAS - AO MAX PG: 66.3 MMHG
BH CV ECHO MEAS - AO MEAN PG: 42 MMHG
BH CV ECHO MEAS - AO ROOT DIAM: 2.9 CM
BH CV ECHO MEAS - AO V2 MAX: 407 CM/SEC
BH CV ECHO MEAS - AO V2 VTI: 83.6 CM
BH CV ECHO MEAS - AVA(I,D): 0.61 CM2
BH CV ECHO MEAS - EDV(CUBED): 83.1 ML
BH CV ECHO MEAS - EDV(MOD-SP2): 90 ML
BH CV ECHO MEAS - EDV(MOD-SP4): 85 ML
BH CV ECHO MEAS - EF(MOD-BP): 64.2 %
BH CV ECHO MEAS - EF(MOD-SP2): 64.4 %
BH CV ECHO MEAS - EF(MOD-SP4): 63.5 %
BH CV ECHO MEAS - ESV(CUBED): 29.6 ML
BH CV ECHO MEAS - ESV(MOD-SP2): 32 ML
BH CV ECHO MEAS - ESV(MOD-SP4): 31 ML
BH CV ECHO MEAS - FS: 29.1 %
BH CV ECHO MEAS - IVS/LVPW: 1.04 CM
BH CV ECHO MEAS - IVSD: 1.39 CM
BH CV ECHO MEAS - LAT PEAK E' VEL: 6.6 CM/SEC
BH CV ECHO MEAS - LV DIASTOLIC VOL/BSA (35-75): 47.2 CM2
BH CV ECHO MEAS - LV MASS(C)D: 228.6 GRAMS
BH CV ECHO MEAS - LV MAX PG: 2.5 MMHG
BH CV ECHO MEAS - LV MEAN PG: 1 MMHG
BH CV ECHO MEAS - LV SYSTOLIC VOL/BSA (12-30): 17.2 CM2
BH CV ECHO MEAS - LV V1 MAX: 79.7 CM/SEC
BH CV ECHO MEAS - LV V1 VTI: 16.4 CM
BH CV ECHO MEAS - LVIDD: 4.4 CM
BH CV ECHO MEAS - LVIDS: 3.1 CM
BH CV ECHO MEAS - LVOT AREA: 3.1 CM2
BH CV ECHO MEAS - LVOT DIAM: 1.99 CM
BH CV ECHO MEAS - LVPWD: 1.34 CM
BH CV ECHO MEAS - MED PEAK E' VEL: 6.2 CM/SEC
BH CV ECHO MEAS - MV A DUR: 0.11 SEC
BH CV ECHO MEAS - MV A MAX VEL: 103.3 CM/SEC
BH CV ECHO MEAS - MV DEC SLOPE: 405.8 CM/SEC2
BH CV ECHO MEAS - MV DEC TIME: 0.22 MSEC
BH CV ECHO MEAS - MV E MAX VEL: 57.8 CM/SEC
BH CV ECHO MEAS - MV E/A: 0.56
BH CV ECHO MEAS - MV MAX PG: 6.6 MMHG
BH CV ECHO MEAS - MV MEAN PG: 2.13 MMHG
BH CV ECHO MEAS - MV P1/2T: 58.1 MSEC
BH CV ECHO MEAS - MV V2 VTI: 22 CM
BH CV ECHO MEAS - MVA(P1/2T): 3.8 CM2
BH CV ECHO MEAS - MVA(VTI): 2.31 CM2
BH CV ECHO MEAS - PA ACC TIME: 0.1 SEC
BH CV ECHO MEAS - PA PR(ACCEL): 32.7 MMHG
BH CV ECHO MEAS - PA V2 MAX: 109.6 CM/SEC
BH CV ECHO MEAS - PULM A REVS DUR: 0.12 SEC
BH CV ECHO MEAS - PULM A REVS VEL: 33.8 CM/SEC
BH CV ECHO MEAS - PULM DIAS VEL: 36.4 CM/SEC
BH CV ECHO MEAS - PULM S/D: 1.14
BH CV ECHO MEAS - PULM SYS VEL: 41.6 CM/SEC
BH CV ECHO MEAS - QP/QS: 1.11
BH CV ECHO MEAS - RAP SYSTOLE: 3 MMHG
BH CV ECHO MEAS - RV MAX PG: 3.6 MMHG
BH CV ECHO MEAS - RV V1 MAX: 95.1 CM/SEC
BH CV ECHO MEAS - RV V1 VTI: 16.6 CM
BH CV ECHO MEAS - RVOT DIAM: 2.08 CM
BH CV ECHO MEAS - RVSP: 15 MMHG
BH CV ECHO MEAS - SI(MOD-SP2): 32.2 ML/M2
BH CV ECHO MEAS - SI(MOD-SP4): 30 ML/M2
BH CV ECHO MEAS - SUP REN AO DIAM: 2.1 CM
BH CV ECHO MEAS - SV(LVOT): 50.9 ML
BH CV ECHO MEAS - SV(MOD-SP2): 58 ML
BH CV ECHO MEAS - SV(MOD-SP4): 54 ML
BH CV ECHO MEAS - SV(RVOT): 56.3 ML
BH CV ECHO MEAS - TAPSE (>1.6): 1.66 CM
BH CV ECHO MEAS - TR MAX PG: 12.1 MMHG
BH CV ECHO MEAS - TR MAX VEL: 173.7 CM/SEC
BH CV ECHO MEASUREMENTS AVERAGE E/E' RATIO: 9.03
BH CV XLRA - RV BASE: 2.39 CM
BH CV XLRA - RV LENGTH: 5.6 CM
BH CV XLRA - RV MID: 2.01 CM
BH CV XLRA - TDI S': 11.1 CM/SEC
LEFT ATRIUM VOLUME INDEX: 19.8 ML/M2
LV EF 2D ECHO EST: 64 %
MAXIMAL PREDICTED HEART RATE: 142 BPM
SINUS: 3.4 CM
STJ: 3 CM
STRESS TARGET HR: 121 BPM

## 2022-10-03 PROCEDURE — 99214 OFFICE O/P EST MOD 30 MIN: CPT | Performed by: INTERNAL MEDICINE

## 2022-10-03 PROCEDURE — 93000 ELECTROCARDIOGRAM COMPLETE: CPT | Performed by: INTERNAL MEDICINE

## 2022-10-03 PROCEDURE — 93306 TTE W/DOPPLER COMPLETE: CPT

## 2022-10-03 PROCEDURE — 25010000002 PERFLUTREN (DEFINITY) 8.476 MG IN SODIUM CHLORIDE (PF) 0.9 % 10 ML INJECTION: Performed by: INTERNAL MEDICINE

## 2022-10-03 PROCEDURE — 93306 TTE W/DOPPLER COMPLETE: CPT | Performed by: INTERNAL MEDICINE

## 2022-10-03 RX ADMIN — PERFLUTREN 1.5 ML: 6.52 INJECTION, SUSPENSION INTRAVENOUS at 10:33

## 2022-10-03 NOTE — PROGRESS NOTES
Pooja Chau  1945  Date of Office Visit: 10/03/22  Encounter Provider: Tanner Hester MD  Place of Service: [facility]      CHIEF COMPLAINT:  Aortic valve stenosis, severe. Asymptomatic  Essential hypertension  Hyperlipidemia    HISTORY OF PRESENT ILLNESS:  I had the pleasure of seeing Ms. Pooja Chau in follow-up.  She is a 77-year-old female with a medical history of severe degenerative aortic valve stenosis that is asymptomatic, essential hypertension and hyperlipidemia who presents to me in follow-up.  She underwent transthoracic echocardiogram over the past few years and as of 2021 had severe aortic valve stenosis with a mean gradient of 41 mmHg across the aortic valve.  On my review of her catheterization and echocardiogram report this is confirmed.  She also has a normal left ventricular size and systolic function.  She had normal coronary arteries.    She continues to do very well.  She denies any chest pain or dyspnea on exertion.  She can walk up 4 flights of stairs without significant limitation.  Her echocardiogram today still shows severe degenerative aortic valve stenosis with a preserved ejection fraction and left ventricular cavity size.  The gradients are not significantly changed from where they were a year ago.    Review of Systems   Constitutional: Negative for fever and malaise/fatigue.   HENT: Negative for nosebleeds and sore throat.    Eyes: Negative for blurred vision and double vision.   Cardiovascular: Negative for chest pain, claudication, palpitations and syncope.   Respiratory: Negative for cough, shortness of breath and snoring.    Endocrine: Negative for cold intolerance, heat intolerance and polydipsia.   Skin: Negative for itching, poor wound healing and rash.   Musculoskeletal: Negative for joint pain, joint swelling, muscle weakness and myalgias.   Gastrointestinal: Negative for abdominal pain, melena, nausea and  vomiting.   Neurological: Negative for light-headedness, loss of balance, seizures, vertigo and weakness.   Psychiatric/Behavioral: Negative for altered mental status and depression.       Past Medical History:   Diagnosis Date   • Aortic stenosis    • Back pain    • Bronchitis    • Claustrophobia    • Heart disease    • Hypertension    • Pneumonia        The following portions of the patient's history were reviewed and updated as appropriate: Social history , Family history and Surgical history     Current Outpatient Medications on File Prior to Visit   Medication Sig Dispense Refill   • amLODIPine (NORVASC) 5 MG tablet Take 1 tablet by mouth Daily. 90 tablet 1   • cyclobenzaprine (FLEXERIL) 10 MG tablet Take 1 tablet by mouth Every 8 (Eight) Hours As Needed for Muscle Spasms. 90 tablet 1   • FLUoxetine (PROzac) 20 MG capsule Take 1 capsule by mouth Daily. To take for seasonal depression. Once season depression has subsided, pt goes back down to 20mg daily. 90 capsule 1   • FLUoxetine (PROzac) 40 MG capsule Take 40 mg by mouth Daily As Needed. To take for seasonal depression. Once season depression has subsided, pt goes back down to 20mg daily.     • hydroCHLOROthiazide (HYDRODIURIL) 25 MG tablet Take 1 tablet by mouth Daily. 90 tablet 1   • hydrOXYzine (ATARAX) 25 MG tablet Take 0.5 tablets by mouth Every 8 (Eight) Hours As Needed for Anxiety. 90 tablet 1   • simvastatin (ZOCOR) 40 MG tablet Take 1 tablet by mouth Every Night. 90 tablet 1   • Vibegron (Gemtesa) 75 MG tablet Take 1 tablet by mouth Daily. 14 tablet 0   • Vibegron 75 MG tablet Take 1 tablet by mouth Daily (Monday-Friday). 90 tablet 3     Current Facility-Administered Medications on File Prior to Visit   Medication Dose Route Frequency Provider Last Rate Last Admin   • [COMPLETED] perflutren (DEFINITY) 8.476 mg in Sodium Chloride (PF) 0.9 % 10 mL injection  1.5 mL Intravenous Once in imaging Tanner Hester MD   1.5 mL at 10/03/22 1034  "      Allergies   Allergen Reactions   • Nsaids Swelling   • Codeine Nausea Only   • Hydrocodone-Acetaminophen Other (See Comments)       Vitals:    10/03/22 1041   BP: 136/80   BP Location: Left arm   Patient Position: Sitting   Pulse: 92   Weight: 75.9 kg (167 lb 6.4 oz)   Height: 162.6 cm (64\")     Body mass index is 28.73 kg/m².   Constitutional:       Appearance: Well-developed.   Eyes:      General: No scleral icterus.     Conjunctiva/sclera: Conjunctivae normal.   HENT:      Head: Normocephalic and atraumatic.   Neck:      Thyroid: No thyromegaly.      Vascular: Normal carotid pulses. No carotid bruit, hepatojugular reflux or JVD.      Trachea: No tracheal deviation.   Pulmonary:      Effort: No respiratory distress.      Breath sounds: Normal breath sounds. No decreased breath sounds. No wheezing. No rhonchi. No rales.   Chest:      Chest wall: Not tender to palpatation.   Cardiovascular:      Normal rate. Regular rhythm.      Murmurs: There is a grade 3/6 mid to late systolic murmur.      No gallop.   Pulses:     Carotid: 2+ bilaterally.     Radial: 2+ bilaterally.     Femoral: 2+ bilaterally.     Dorsalis pedis: 2+ bilaterally.     Posterior tibial: 2+ bilaterally.  Abdominal:      General: Bowel sounds are normal. There is no distension.      Palpations: Abdomen is soft.      Tenderness: There is no abdominal tenderness.   Musculoskeletal:         General: No deformity.      Cervical back: Normal range of motion and neck supple. Skin:     Findings: No erythema or rash.   Neurological:      Mental Status: Alert and oriented to person, place, and time.      Sensory: No sensory deficit.   Psychiatric:         Behavior: Behavior normal.              ECG 12 Lead    Date/Time: 10/3/2022 11:12 AM  Performed by: Tanner Hester MD  Authorized by: Tanner Hester MD   Comparison: compared with previous ECG from 3/23/2022  Similar to previous ECG  Rhythm: sinus rhythm  Rate: normal  Conduction: right " bundle branch block                 DISCUSSION/SUMMARY  Very pleasant 77-year-old female with a medical history of severe degenerative aortic valve stenosis that is asymptomatic, hypertension and hyperlipidemia presents to me as a transfer of care.  She continues to maintain a least a moderate level of activity with an asymptomatic state.  She denies any chest pain or dyspnea on exertion.  She has had no presyncope or syncope.  Her blood pressure and heart rate are well controlled.    1.  Severe degenerative aortic valve stenosis: Preserved left ventricular ejection fraction and ventricular size.  This is asymptomatic.  -Echocardiogram today has been reviewed.  Unchanged from prior echo 1 year ago.  Continue conservative management.  She will call me if she has any issues with lightheadedness, chest pain or dyspnea with her level of activity.    2.  Essential hypertension: Reasonable control.  No change in current medical regimen of hydrochlorothiazide and amlodipine.  No lower extremity edema reported.

## 2022-11-21 ENCOUNTER — OFFICE VISIT (OUTPATIENT)
Dept: FAMILY MEDICINE CLINIC | Age: 77
End: 2022-11-21

## 2022-11-21 ENCOUNTER — LAB (OUTPATIENT)
Dept: LAB | Facility: HOSPITAL | Age: 77
End: 2022-11-21

## 2022-11-21 VITALS
TEMPERATURE: 97.9 F | WEIGHT: 168.6 LBS | DIASTOLIC BLOOD PRESSURE: 61 MMHG | OXYGEN SATURATION: 95 % | BODY MASS INDEX: 28.79 KG/M2 | SYSTOLIC BLOOD PRESSURE: 151 MMHG | HEIGHT: 64 IN | HEART RATE: 71 BPM

## 2022-11-21 DIAGNOSIS — Z11.59 SCREENING FOR VIRAL DISEASE: ICD-10-CM

## 2022-11-21 DIAGNOSIS — Z23 ENCOUNTER FOR IMMUNIZATION: ICD-10-CM

## 2022-11-21 DIAGNOSIS — M65.30 ACQUIRED TRIGGER FINGER: ICD-10-CM

## 2022-11-21 DIAGNOSIS — Z83.3 FAMILY HISTORY OF DIABETES MELLITUS: ICD-10-CM

## 2022-11-21 DIAGNOSIS — E66.3 OVERWEIGHT: ICD-10-CM

## 2022-11-21 DIAGNOSIS — D72.829 LEUKOCYTOSIS, UNSPECIFIED TYPE: ICD-10-CM

## 2022-11-21 DIAGNOSIS — Z13.6 SCREENING FOR CARDIOVASCULAR CONDITION: ICD-10-CM

## 2022-11-21 DIAGNOSIS — Z00.00 MEDICARE ANNUAL WELLNESS VISIT, SUBSEQUENT: Primary | ICD-10-CM

## 2022-11-21 LAB
ALBUMIN SERPL-MCNC: 4.5 G/DL (ref 3.5–5.2)
ALBUMIN/GLOB SERPL: 1.8 G/DL
ALP SERPL-CCNC: 133 U/L (ref 39–117)
ALT SERPL W P-5'-P-CCNC: 22 U/L (ref 1–33)
ANION GAP SERPL CALCULATED.3IONS-SCNC: 9 MMOL/L (ref 5–15)
AST SERPL-CCNC: 18 U/L (ref 1–32)
BASOPHILS # BLD AUTO: 0.11 10*3/MM3 (ref 0–0.2)
BASOPHILS NFR BLD AUTO: 1 % (ref 0–1.5)
BILIRUB SERPL-MCNC: 0.3 MG/DL (ref 0–1.2)
BUN SERPL-MCNC: 15 MG/DL (ref 8–23)
BUN/CREAT SERPL: 22.7 (ref 7–25)
CALCIUM SPEC-SCNC: 10 MG/DL (ref 8.6–10.5)
CHLORIDE SERPL-SCNC: 97 MMOL/L (ref 98–107)
CHOLEST SERPL-MCNC: 213 MG/DL (ref 0–200)
CO2 SERPL-SCNC: 32 MMOL/L (ref 22–29)
CREAT SERPL-MCNC: 0.66 MG/DL (ref 0.57–1)
DEPRECATED RDW RBC AUTO: 57.3 FL (ref 37–54)
EGFRCR SERPLBLD CKD-EPI 2021: 89.9 ML/MIN/1.73
EOSINOPHIL # BLD AUTO: 0.23 10*3/MM3 (ref 0–0.4)
EOSINOPHIL NFR BLD AUTO: 2.1 % (ref 0.3–6.2)
ERYTHROCYTE [DISTWIDTH] IN BLOOD BY AUTOMATED COUNT: 16.3 % (ref 12.3–15.4)
GLOBULIN UR ELPH-MCNC: 2.5 GM/DL
GLUCOSE SERPL-MCNC: 102 MG/DL (ref 65–99)
HBA1C MFR BLD: 5.5 % (ref 4.8–5.6)
HCT VFR BLD AUTO: 46.3 % (ref 34–46.6)
HCV AB SER DONR QL: NORMAL
HDLC SERPL-MCNC: 71 MG/DL (ref 40–60)
HGB BLD-MCNC: 14.2 G/DL (ref 12–15.9)
IMM GRANULOCYTES # BLD AUTO: 0.01 10*3/MM3 (ref 0–0.05)
IMM GRANULOCYTES NFR BLD AUTO: 0.1 % (ref 0–0.5)
LDLC SERPL CALC-MCNC: 123 MG/DL (ref 0–100)
LDLC/HDLC SERPL: 1.69 {RATIO}
LYMPHOCYTES # BLD AUTO: 3.09 10*3/MM3 (ref 0.7–3.1)
LYMPHOCYTES NFR BLD AUTO: 27.7 % (ref 19.6–45.3)
MCH RBC QN AUTO: 28.9 PG (ref 26.6–33)
MCHC RBC AUTO-ENTMCNC: 30.7 G/DL (ref 31.5–35.7)
MCV RBC AUTO: 94.3 FL (ref 79–97)
MONOCYTES # BLD AUTO: 0.72 10*3/MM3 (ref 0.1–0.9)
MONOCYTES NFR BLD AUTO: 6.5 % (ref 5–12)
NEUTROPHILS NFR BLD AUTO: 62.6 % (ref 42.7–76)
NEUTROPHILS NFR BLD AUTO: 7 10*3/MM3 (ref 1.7–7)
PLATELET # BLD AUTO: 640 10*3/MM3 (ref 140–450)
PMV BLD AUTO: 11.2 FL (ref 6–12)
POTASSIUM SERPL-SCNC: 4 MMOL/L (ref 3.5–5.2)
PROT SERPL-MCNC: 7 G/DL (ref 6–8.5)
RBC # BLD AUTO: 4.91 10*6/MM3 (ref 3.77–5.28)
SODIUM SERPL-SCNC: 138 MMOL/L (ref 136–145)
TRIGL SERPL-MCNC: 109 MG/DL (ref 0–150)
VLDLC SERPL-MCNC: 19 MG/DL (ref 5–40)
WBC NRBC COR # BLD: 11.16 10*3/MM3 (ref 3.4–10.8)

## 2022-11-21 PROCEDURE — 86803 HEPATITIS C AB TEST: CPT

## 2022-11-21 PROCEDURE — 83036 HEMOGLOBIN GLYCOSYLATED A1C: CPT

## 2022-11-21 PROCEDURE — 1170F FXNL STATUS ASSESSED: CPT | Performed by: NURSE PRACTITIONER

## 2022-11-21 PROCEDURE — G0439 PPPS, SUBSEQ VISIT: HCPCS | Performed by: NURSE PRACTITIONER

## 2022-11-21 PROCEDURE — 80061 LIPID PANEL: CPT

## 2022-11-21 PROCEDURE — 90677 PCV20 VACCINE IM: CPT | Performed by: NURSE PRACTITIONER

## 2022-11-21 PROCEDURE — 1159F MED LIST DOCD IN RCRD: CPT | Performed by: NURSE PRACTITIONER

## 2022-11-21 PROCEDURE — 85025 COMPLETE CBC W/AUTO DIFF WBC: CPT

## 2022-11-21 PROCEDURE — 80053 COMPREHEN METABOLIC PANEL: CPT

## 2022-11-21 PROCEDURE — G0009 ADMIN PNEUMOCOCCAL VACCINE: HCPCS | Performed by: NURSE PRACTITIONER

## 2022-11-21 PROCEDURE — 36415 COLL VENOUS BLD VENIPUNCTURE: CPT

## 2022-11-21 NOTE — PROGRESS NOTES
The ABCs of the Annual Wellness Visit  Subsequent Medicare Wellness Visit    Chief Complaint   Patient presents with   • Annual Exam     MCW   Patient says she's getting more out of breath when doing activities, such as climbing steps. She's also concerned about her weight fluctuating & seeing a hand surgeon.        Subjective    History of Present Illness:  Pooja Chau is a 77 y.o. female who presents for a Subsequent Medicare Wellness Visit.    The following portions of the patient's history were reviewed and   updated as appropriate: allergies, current medications, past family history, past medical history, past social history, past surgical history and problem list.    Compared to one year ago, the patient feels her physical   health is the same.    Compared to one year ago, the patient feels her mental   health is the same.    Dyspnea: Patient complains of shortness of breath after more than one flight stairs.  Symptoms include dyspnea on exertion and shortness of breath. Symptoms began a few months ago, unchanged since that time.  Patient denies chest pain, located n/a, cough after eating and productive cough. Weight has increased 2-3 pounds over last months.  Appetite has been unchanged.   Has had multiple trigger finger releases in the past in [place]    Recent Hospitalizations:  She was not admitted to the hospital during the last year.       Current Medical Providers:  Patient Care Team:  Risa Borrego APRN as PCP - General (Nurse Practitioner)  Tanner Hester MD as Consulting Physician (Cardiology)  Hernan Villagomez MD PhD as Consulting Physician (Ophthalmology)  Hailey Patel MD as Consulting Physician (Urology)    Outpatient Medications Prior to Visit   Medication Sig Dispense Refill   • amLODIPine (NORVASC) 5 MG tablet Take 1 tablet by mouth Daily. 90 tablet 1   • cyclobenzaprine (FLEXERIL) 10 MG tablet Take 1 tablet by mouth Every 8 (Eight) Hours As Needed for Muscle  Spasms. 90 tablet 1   • FLUoxetine (PROzac) 20 MG capsule Take 1 capsule by mouth Daily. To take for seasonal depression. Once season depression has subsided, pt goes back down to 20mg daily. 90 capsule 1   • hydroCHLOROthiazide (HYDRODIURIL) 25 MG tablet Take 1 tablet by mouth Daily. 90 tablet 1   • hydrOXYzine (ATARAX) 25 MG tablet Take 0.5 tablets by mouth Every 8 (Eight) Hours As Needed for Anxiety. 90 tablet 1   • simvastatin (ZOCOR) 40 MG tablet Take 1 tablet by mouth Every Night. 90 tablet 1   • Vibegron (Gemtesa) 75 MG tablet Take 1 tablet by mouth Daily. 14 tablet 0   • FLUoxetine (PROzac) 40 MG capsule Take 40 mg by mouth Daily As Needed. To take for seasonal depression. Once season depression has subsided, pt goes back down to 20mg daily.     • Vibegron 75 MG tablet Take 1 tablet by mouth Daily (Monday-Friday). 90 tablet 3     No facility-administered medications prior to visit.       No opioid medication identified on active medication list. I have reviewed chart for other potential  high risk medication/s and harmful drug interactions in the elderly.          Aspirin is not on active medication list.  Aspirin use is not indicated based on review of current medical condition/s. Risk of harm outweighs potential benefits.  .    Patient Active Problem List   Diagnosis   • Hypertensive disorder   • Anxiety with depression   • Hyperlipidemia   • Aortic stenosis   • Cardiac murmur   • Macular degeneration   • Colon polyp   • Precancerous skin lesion   • Hiatal hernia   • Chronic pain of left knee   • Hand pain   • Tear of meniscus of knee   • Spondylolisthesis   • Spasm of back muscles   • Preoperative state   • Osteoarthritis of right hand   • Osteoarthritis of left knee   • Gastroesophageal reflux disease   • Female stress incontinence   • Dysplasia of vulva   • Disorder of acromioclavicular joint   • Chronic neck pain   • Cervical radiculopathy   • Anxiety   • Acquired trigger finger     Advance Care  "Planning  Advance Directive is not on file.  ACP discussion was held with the patient during this visit. Patient has an advance directive (not in EMR), copy requested.    Review of Systems   Respiratory: Positive for shortness of breath.    Genitourinary: Positive for urgency.        Objective    Vitals:    11/21/22 0931 11/21/22 1034   BP: 166/72 151/61   BP Location: Right arm Left arm   Patient Position: Sitting Sitting   Cuff Size: Adult Adult   Pulse: 77 71   Temp: 97.9 °F (36.6 °C)    TempSrc: Oral    SpO2: 95%    Weight: 76.5 kg (168 lb 9.6 oz)    Height: 162.6 cm (64\")      Estimated body mass index is 28.94 kg/m² as calculated from the following:    Height as of this encounter: 162.6 cm (64\").    Weight as of this encounter: 76.5 kg (168 lb 9.6 oz).    BMI is >= 25 and <30. (Overweight) The following options were offered after discussion;: weight loss educational material (shared in after visit summary) and nutrition counseling/recommendations      Does the patient have evidence of cognitive impairment? No    Physical Exam  Lab Results   Component Value Date    TRIG 109 11/21/2022    HDL 71 (H) 11/21/2022     (H) 11/21/2022    VLDL 19 11/21/2022    HGBA1C 5.50 11/21/2022            HEALTH RISK ASSESSMENT    Smoking Status:  Social History     Tobacco Use   Smoking Status Never   Smokeless Tobacco Never     Alcohol Consumption:  Social History     Substance and Sexual Activity   Alcohol Use Yes    Comment: VERY RARELY/ONCE A MONTH/socially     Fall Risk Screen:    STEADI Fall Risk Assessment was completed, and patient is at LOW risk for falls.Assessment completed on:11/21/2022    Depression Screening:  PHQ-2/PHQ-9 Depression Screening 11/21/2022   Retired PHQ-9 Total Score -   Retired Total Score -   Little Interest or Pleasure in Doing Things 0-->not at all   Feeling Down, Depressed or Hopeless 0-->not at all   PHQ-9: Brief Depression Severity Measure Score 0       Health Habits and Functional and " Cognitive Screening:  Functional & Cognitive Status 11/21/2022   Do you have difficulty preparing food and eating? No   Do you have difficulty bathing yourself, getting dressed or grooming yourself? No   Do you have difficulty using the toilet? No   Do you have difficulty moving around from place to place? No   Do you have trouble with steps or getting out of a bed or a chair? No   Current Diet Well Balanced Diet   Dental Exam Not up to date   Eye Exam Up to date   Exercise (times per week) 3 times per week   Current Exercises Include Walking   Do you need help using the phone?  No   Are you deaf or do you have serious difficulty hearing?  No   Do you need help with transportation? No   Do you need help shopping? No   Do you need help preparing meals?  No   Do you need help with housework?  No   Do you need help with laundry? No   Do you need help taking your medications? No   Do you need help managing money? No   Do you ever drive or ride in a car without wearing a seat belt? No   Have you felt unusual stress, anger or loneliness in the last month? No   Who do you live with? Spouse   If you need help, do you have trouble finding someone available to you? No   Have you been bothered in the last four weeks by sexual problems? No   Do you have difficulty concentrating, remembering or making decisions? No       Age-appropriate Screening Schedule:  Refer to the list below for future screening recommendations based on patient's age, sex and/or medical conditions. Orders for these recommended tests are listed in the plan section. The patient has been provided with a written plan.    Health Maintenance   Topic Date Due   • DXA SCAN  Never done   • TDAP/TD VACCINES (1 - Tdap) Never done   • ZOSTER VACCINE (2 of 2) 08/15/2020   • LIPID PANEL  11/21/2023   • INFLUENZA VACCINE  Completed              Assessment & Plan   CMS Preventative Services Quick Reference  Risk Factors Identified During Encounter  Glaucoma or Family  History of Glaucoma  Immunizations Discussed/Encouraged (specific Immunizations; Tdap, Prevnar 20 (Pneumococcal 20-valent conjugate) and Shingrix  Obesity/Overweight   The above risks/problems have been discussed with the patient.  Follow up actions/plans if indicated are seen below in the Assessment/Plan Section.  Pertinent information has been shared with the patient in the After Visit Summary.    Diagnoses and all orders for this visit:    1. Medicare annual wellness visit, subsequent (Primary)  Comments:  Diet and exercise as tolerated health maintenance recommendations discussed annual Medicare wellness recommended    2. Overweight    3. Screening for cardiovascular condition  -     Lipid panel; Future  -     Comprehensive metabolic panel; Future    4. Family history of diabetes mellitus  -     Hemoglobin A1c; Future    5. Encounter for immunization  -     Pneumococcal Conjugate Vaccine 20-Valent (PCV20)    6. Screening for viral disease  -     Hepatitis C antibody; Future    7. Acquired trigger finger  Comments:  right index finger    8. Leukocytosis, unspecified type  Comments:  Repeat labs  Orders:  -     CBC & Differential; Future        Follow Up:   No follow-ups on file.     An After Visit Summary and PPPS were made available to the patient.

## 2022-12-01 DIAGNOSIS — D75.839 THROMBOCYTOSIS: Primary | ICD-10-CM

## 2022-12-01 DIAGNOSIS — D72.829 LEUKOCYTOSIS, UNSPECIFIED TYPE: ICD-10-CM

## 2022-12-07 ENCOUNTER — LAB (OUTPATIENT)
Dept: LAB | Facility: HOSPITAL | Age: 77
End: 2022-12-07

## 2022-12-07 DIAGNOSIS — D75.839 THROMBOCYTOSIS: ICD-10-CM

## 2022-12-07 DIAGNOSIS — D72.829 LEUKOCYTOSIS, UNSPECIFIED TYPE: ICD-10-CM

## 2022-12-07 LAB
BASOPHILS # BLD AUTO: 0.12 10*3/MM3 (ref 0–0.2)
BASOPHILS NFR BLD AUTO: 0.9 % (ref 0–1.5)
DEPRECATED RDW RBC AUTO: 54 FL (ref 37–54)
EOSINOPHIL # BLD AUTO: 0.34 10*3/MM3 (ref 0–0.4)
EOSINOPHIL NFR BLD AUTO: 2.6 % (ref 0.3–6.2)
ERYTHROCYTE [DISTWIDTH] IN BLOOD BY AUTOMATED COUNT: 16 % (ref 12.3–15.4)
HCT VFR BLD AUTO: 43.3 % (ref 34–46.6)
HGB BLD-MCNC: 13.8 G/DL (ref 12–15.9)
IMM GRANULOCYTES # BLD AUTO: 0.01 10*3/MM3 (ref 0–0.05)
IMM GRANULOCYTES NFR BLD AUTO: 0.1 % (ref 0–0.5)
LYMPHOCYTES # BLD AUTO: 3.52 10*3/MM3 (ref 0.7–3.1)
LYMPHOCYTES NFR BLD AUTO: 27.3 % (ref 19.6–45.3)
MCH RBC QN AUTO: 29.2 PG (ref 26.6–33)
MCHC RBC AUTO-ENTMCNC: 31.9 G/DL (ref 31.5–35.7)
MCV RBC AUTO: 91.5 FL (ref 79–97)
MONOCYTES # BLD AUTO: 0.97 10*3/MM3 (ref 0.1–0.9)
MONOCYTES NFR BLD AUTO: 7.5 % (ref 5–12)
NEUTROPHILS NFR BLD AUTO: 61.6 % (ref 42.7–76)
NEUTROPHILS NFR BLD AUTO: 7.94 10*3/MM3 (ref 1.7–7)
PLATELET # BLD AUTO: 676 10*3/MM3 (ref 140–450)
PMV BLD AUTO: 11.4 FL (ref 6–12)
RBC # BLD AUTO: 4.73 10*6/MM3 (ref 3.77–5.28)
WBC NRBC COR # BLD: 12.9 10*3/MM3 (ref 3.4–10.8)

## 2022-12-07 PROCEDURE — 85025 COMPLETE CBC W/AUTO DIFF WBC: CPT

## 2022-12-07 PROCEDURE — 36415 COLL VENOUS BLD VENIPUNCTURE: CPT

## 2022-12-09 ENCOUNTER — OFFICE VISIT (OUTPATIENT)
Dept: FAMILY MEDICINE CLINIC | Age: 77
End: 2022-12-09

## 2022-12-09 VITALS
WEIGHT: 167.8 LBS | BODY MASS INDEX: 28.65 KG/M2 | DIASTOLIC BLOOD PRESSURE: 85 MMHG | OXYGEN SATURATION: 100 % | TEMPERATURE: 98.4 F | HEIGHT: 64 IN | HEART RATE: 98 BPM | SYSTOLIC BLOOD PRESSURE: 143 MMHG

## 2022-12-09 DIAGNOSIS — R09.82 POST-NASAL DRAINAGE: ICD-10-CM

## 2022-12-09 DIAGNOSIS — J02.9 SORE THROAT: Primary | ICD-10-CM

## 2022-12-09 LAB
EXPIRATION DATE: NORMAL
INTERNAL CONTROL: NORMAL
Lab: NORMAL
S PYO AG THROAT QL: NEGATIVE

## 2022-12-09 PROCEDURE — 87081 CULTURE SCREEN ONLY: CPT | Performed by: PHYSICIAN ASSISTANT

## 2022-12-09 PROCEDURE — 99213 OFFICE O/P EST LOW 20 MIN: CPT | Performed by: PHYSICIAN ASSISTANT

## 2022-12-09 PROCEDURE — 87880 STREP A ASSAY W/OPTIC: CPT | Performed by: PHYSICIAN ASSISTANT

## 2022-12-09 RX ORDER — NEOMYCIN SULFATE, POLYMYXIN B SULFATE, AND DEXAMETHASONE 3.5; 10000; 1 MG/G; [USP'U]/G; MG/G
OINTMENT OPHTHALMIC
COMMUNITY
Start: 2022-11-22 | End: 2022-12-09

## 2022-12-09 RX ORDER — FLUTICASONE PROPIONATE 50 MCG
2 SPRAY, SUSPENSION (ML) NASAL DAILY
Qty: 18.2 ML | Refills: 0 | Status: SHIPPED | OUTPATIENT
Start: 2022-12-09

## 2022-12-09 RX ORDER — ASPIRIN 81 MG/1
81 TABLET, CHEWABLE ORAL DAILY
COMMUNITY

## 2022-12-09 NOTE — PROGRESS NOTES
"Subjective     CHIEF COMPLAINT    Chief Complaint   Patient presents with   • Sore Throat     X 3 days. Throat is sore to touch pt states.             History of Present Illness  This is a 77-year-old female presenting to clinic complaining of sore throat and tender lymph nodes for the last 3 days.  She has had some hematologic abnormalities over the last few months including elevated platelets and white blood cell count and is currently seeing Dr. Henry for further evaluation of this.  She had some routine repeat testing done and was noticed to still have the elevated white blood cell count.  When nursing staff called her to discuss her results they also check to see if she had any reason to be concerned about an infection and she mentioned her sore throat so they recommended she come in for evaluation.  She denies any fevers or chills and has not been around anyone sick recently that she knows of.            Review of Systems   Constitutional: Negative for chills and fever.   HENT: Positive for postnasal drip (\"always clearing my throat\"), sore throat and voice change. Negative for congestion and trouble swallowing.    Respiratory: Negative for cough.    Musculoskeletal: Negative for myalgias.            Past Medical History:   Diagnosis Date   • Aortic stenosis    • Back pain    • Bronchitis    • Claustrophobia    • Heart disease    • Hypertension    • Pneumonia             Past Surgical History:   Procedure Laterality Date   • CERVICAL DISCECTOMY ANTERIOR  10/2018    c4-c7 with fusion   • HYSTERECTOMY      at age 39   with BLSO   • KNEE SURGERY Bilateral     2x/s in right   1 x in left    arthroscopy     • SHOULDER SURGERY Left             Family History   Problem Relation Age of Onset   • Diabetes Mother         complications from DM   • Coronary artery disease Father    • Stroke Father    • Diabetes Maternal Grandmother    • Depression Sister    • Depression Brother             Social History " "    Socioeconomic History   • Marital status:    • Number of children: 2   Tobacco Use   • Smoking status: Never   • Smokeless tobacco: Never   Vaping Use   • Vaping Use: Never used   Substance and Sexual Activity   • Alcohol use: Yes     Comment: VERY RARELY/ONCE A MONTH/socially   • Drug use: Never   • Sexual activity: Defer            Allergies   Allergen Reactions   • Nsaids Swelling   • Codeine Nausea Only            Current Outpatient Medications on File Prior to Visit   Medication Sig Dispense Refill   • amLODIPine (NORVASC) 5 MG tablet Take 1 tablet by mouth Daily. 90 tablet 1   • aspirin 81 MG chewable tablet Chew 81 mg Daily.     • cyclobenzaprine (FLEXERIL) 10 MG tablet Take 1 tablet by mouth Every 8 (Eight) Hours As Needed for Muscle Spasms. 90 tablet 1   • FLUoxetine (PROzac) 20 MG capsule Take 1 capsule by mouth Daily. To take for seasonal depression. Once season depression has subsided, pt goes back down to 20mg daily. 90 capsule 1   • FLUoxetine (PROzac) 40 MG capsule Take 40 mg by mouth Daily As Needed. To take for seasonal depression. Once season depression has subsided, pt goes back down to 20mg daily.     • hydroCHLOROthiazide (HYDRODIURIL) 25 MG tablet Take 1 tablet by mouth Daily. 90 tablet 1   • hydrOXYzine (ATARAX) 25 MG tablet Take 0.5 tablets by mouth Every 8 (Eight) Hours As Needed for Anxiety. 90 tablet 1   • simvastatin (ZOCOR) 40 MG tablet Take 1 tablet by mouth Every Night. 90 tablet 1   • [DISCONTINUED] neomycin-polymyxin-dexamethamethasone (POLYDEX) 3.5-40549-7.1 ointment ophthalmic ointment APPLY TWICE DAILY TO CONJUCTIVAL SAC IN LEFT EYE AFTER SURGERY     • Vibegron (Gemtesa) 75 MG tablet Take 1 tablet by mouth Daily. 14 tablet 0     No current facility-administered medications on file prior to visit.            /85 (BP Location: Right arm, Patient Position: Sitting, Cuff Size: Small Adult)   Pulse 98   Temp 98.4 °F (36.9 °C) (Oral)   Ht 162.6 cm (64.02\")   Wt 76.1 " kg (167 lb 12.8 oz)   SpO2 100% Comment: room air  BMI 28.79 kg/m²          Objective     Physical Exam  Vitals and nursing note reviewed.   Constitutional:       General: She is not in acute distress.     Appearance: Normal appearance.   HENT:      Head: Normocephalic and atraumatic.      Right Ear: Tympanic membrane, ear canal and external ear normal.      Left Ear: Tympanic membrane, ear canal and external ear normal.      Nose: No congestion or rhinorrhea.      Mouth/Throat:      Mouth: Mucous membranes are moist.      Pharynx: Oropharynx is clear. Posterior oropharyngeal erythema (Postnasal drainage noted) present.   Eyes:      Extraocular Movements: Extraocular movements intact.      Conjunctiva/sclera: Conjunctivae normal.      Pupils: Pupils are equal, round, and reactive to light.   Cardiovascular:      Rate and Rhythm: Normal rate and regular rhythm.      Heart sounds: Normal heart sounds.   Pulmonary:      Effort: Pulmonary effort is normal. No respiratory distress.      Breath sounds: Normal breath sounds. No wheezing, rhonchi or rales.   Musculoskeletal:      Cervical back: Normal range of motion. No rigidity.   Lymphadenopathy:      Cervical: Cervical adenopathy (Tender adenopathy with minimal swelling) present.   Skin:     General: Skin is warm and dry.   Neurological:      Mental Status: She is alert and oriented to person, place, and time.   Psychiatric:         Mood and Affect: Mood normal.         Behavior: Behavior normal.              Procedures                    Lab Results (last 24 hours)     Procedure Component Value Units Date/Time    POCT rapid strep A [[id number]] Collected: 12/09/22 1047    Specimen: Swab Updated: 12/09/22 1047     Rapid Strep A Screen Negative     Internal Control Passed     Lot Number [id number]     Expiration Date 2/28/24    Beta Strep Culture, Throat - Swab, Throat [[id number]] Collected: 12/09/22 1048    Specimen: Swab from Throat Updated: 12/09/22 1057                 No Radiology Exams Resulted Within Past 24 Hours                    Diagnoses and all orders for this visit:    1. Sore throat (Primary)  Comments:  No evidence of bacterial infection on exam.  Unclear if this is contributing to elevated WBC count at this time.  Follow-up with hematology as scheduled.  Orders:  -     POCT rapid strep A  -     Beta Strep Culture, Throat - , Throat; Future  -     Beta Strep Culture, Throat - Swab, Throat    2. Post-nasal drainage  -     fluticasone (FLONASE) 50 MCG/ACT nasal spray; 2 sprays into the nostril(s) as directed by provider Daily.  Dispense: 18.2 mL; Refill: 0             Additional Instructions for the Follow-ups that You Need to Schedule     Beta Strep Culture, Throat - , Throat    Dec 09, 2022 (Approximate)      Release to patient: Routine Release                         FOR FULL DISCHARGE INSTRUCTIONS/COMMENTS/HANDOUTS please see the   AVS

## 2022-12-11 LAB — BACTERIA SPEC AEROBE CULT: NORMAL

## 2022-12-14 DIAGNOSIS — I10 PRIMARY HYPERTENSION: ICD-10-CM

## 2022-12-15 RX ORDER — HYDROCHLOROTHIAZIDE 25 MG/1
25 TABLET ORAL DAILY
Qty: 90 TABLET | Refills: 1 | Status: SHIPPED | OUTPATIENT
Start: 2022-12-15 | End: 2023-04-05 | Stop reason: SDUPTHER

## 2023-01-05 ENCOUNTER — PROCEDURE VISIT (OUTPATIENT)
Dept: UROLOGY | Facility: CLINIC | Age: 78
End: 2023-01-05
Payer: MEDICARE

## 2023-01-05 DIAGNOSIS — N39.3 STRESS INCONTINENCE: Primary | ICD-10-CM

## 2023-01-05 PROCEDURE — 51729 CYSTOMETROGRAM W/VP&UP: CPT | Performed by: UROLOGY

## 2023-01-05 PROCEDURE — 51741 ELECTRO-UROFLOWMETRY FIRST: CPT | Performed by: UROLOGY

## 2023-01-05 PROCEDURE — 51784 ANAL/URINARY MUSCLE STUDY: CPT | Performed by: UROLOGY

## 2023-01-05 PROCEDURE — 51797 INTRAABDOMINAL PRESSURE TEST: CPT | Performed by: UROLOGY

## 2023-01-10 ENCOUNTER — TELEPHONE (OUTPATIENT)
Dept: UROLOGY | Facility: CLINIC | Age: 78
End: 2023-01-10
Payer: MEDICARE

## 2023-01-10 NOTE — TELEPHONE ENCOUNTER
LMOM that patient can see Dr. Patel tomorrow as scheduled or we can reschedule to Zahra on Monday 1/16/23 at 8 am or 1130 Time and date ok'd per Zahra.

## 2023-01-11 ENCOUNTER — OFFICE VISIT (OUTPATIENT)
Dept: UROLOGY | Facility: CLINIC | Age: 78
End: 2023-01-11
Payer: MEDICARE

## 2023-01-11 VITALS — BODY MASS INDEX: 28.99 KG/M2 | HEIGHT: 64 IN | WEIGHT: 169.8 LBS

## 2023-01-11 DIAGNOSIS — N39.3 STRESS INCONTINENCE, FEMALE: ICD-10-CM

## 2023-01-11 DIAGNOSIS — N32.81 OAB (OVERACTIVE BLADDER): ICD-10-CM

## 2023-01-11 DIAGNOSIS — N81.10 VAGINAL PROLAPSE: Primary | ICD-10-CM

## 2023-01-11 PROCEDURE — 99212 OFFICE O/P EST SF 10 MIN: CPT | Performed by: UROLOGY

## 2023-01-11 NOTE — PROGRESS NOTES
"Chief Complaint  Urinary Incontinence    Subjective          Pooja Chau presents to [facility]  History of Present Illness    The patient was last seen in 09/2022. She was given samples of Gemtesa at that time. She reports the medication did not help. When she was taking the medication, her stomach would roll and she felt hungry all the time. It got to the point where the medication did not work and then they may have worked for a little bit. This is the third prescription she has been on. She reports the technician told her that her bladder was normal, but she had a prolapse. She would probably have to have it done in [place] because she has a heart issue. She has been getting a lot of outpatient surgeries where they put her to sleep. Her cardiologist told her that it was fine. She reports she is from [place] and they do not do that there. She reports she is ready for surgery.      Objective   Vital Signs:   Ht 162.6 cm (64.02\")   Wt 77 kg (169 lb 12.8 oz)   BMI 29.13 kg/m²       Physical Exam  Vitals and nursing note reviewed.   Constitutional:       Appearance: Normal appearance. She is well-developed.   Pulmonary:      Effort: Pulmonary effort is normal.      Breath sounds: Normal air entry.   Neurological:      Mental Status: She is alert and oriented to person, place, and time.      Motor: Motor function is intact.   Psychiatric:         Mood and Affect: Mood normal.         Behavior: Behavior normal.          Result Review :   The following data was reviewed by: Hailey Patel MD on 01/11/2023:    I reviewed her UDS study here with her today.            Assessment and Plan    Diagnoses and all orders for this visit:    1. Vaginal prolapse (Primary)  -     Ambulatory Referral to Gynecologic Urology    2. OAB (overactive bladder)    3. Stress incontinence, female    - Will refer her to Dr. Howard, urogynecology, as she has prolapse, incomplete bladder emptying, and " incontinence.          Follow Up       No follow-ups on file.  Patient was given instructions and counseling regarding her condition or for health maintenance advice. Please see specific information pulled into the AVS if appropriate.     Transcribed from ambient dictation for Hailey Patel MD by Gemma Hurt.  01/11/23   14:48 EST    Patient or patient representative verbalized consent to the visit recording.  I have personally performed the services described in this document as transcribed by the above individual, and it is both accurate and complete.  Hailey Patel MD  1/11/2023  15:13 EST

## 2023-01-16 DIAGNOSIS — F33.2 RECURRENT SEASONAL MAJOR DEPRESSION, SEVERE: ICD-10-CM

## 2023-01-16 RX ORDER — FLUOXETINE HYDROCHLORIDE 20 MG/1
CAPSULE ORAL
Qty: 90 CAPSULE | Refills: 0 | Status: SHIPPED | OUTPATIENT
Start: 2023-01-16

## 2023-01-20 DIAGNOSIS — I10 PRIMARY HYPERTENSION: ICD-10-CM

## 2023-01-21 DIAGNOSIS — E78.5 HYPERLIPIDEMIA, UNSPECIFIED HYPERLIPIDEMIA TYPE: ICD-10-CM

## 2023-01-23 RX ORDER — SIMVASTATIN 40 MG
40 TABLET ORAL NIGHTLY
Qty: 90 TABLET | Refills: 1 | Status: SHIPPED | OUTPATIENT
Start: 2023-01-23 | End: 2023-04-05 | Stop reason: SDUPTHER

## 2023-01-23 RX ORDER — AMLODIPINE BESYLATE 5 MG/1
5 TABLET ORAL DAILY
Qty: 90 TABLET | Refills: 1 | Status: SHIPPED | OUTPATIENT
Start: 2023-01-23 | End: 2023-04-05 | Stop reason: SDUPTHER

## 2023-01-24 ENCOUNTER — TELEPHONE (OUTPATIENT)
Dept: FAMILY MEDICINE CLINIC | Age: 78
End: 2023-01-24

## 2023-01-24 NOTE — TELEPHONE ENCOUNTER
Caller: Pooja Chau    Relationship: Self    Best call back number: [phone]    What is the best time to reach you: ANY    Who are you requesting to speak with (clinical staff, provider,  specific staff member):MEDICAL RECORDS    What was the call regarding: PATIENT STATED THAT SHE IS NEEDING TO KNOW THAT RECORDS  FROM 1/5/23 PROCEDURE HAVE BEEN RECEIVED AND  REVIEWED, AND IS THERE ANYTHING TATIANA SAMS WOULD HAVE PATIENT DO IN RELATION TO RESULTS.  THE PATIENT ALSO STATED THAT SHE HAS AN   APPOINTMENT WITH UROLOGIST DR [facility] IN [place] IN MARCH 2023    Do you require a callback: YES

## 2023-03-14 ENCOUNTER — TELEPHONE (OUTPATIENT)
Dept: CARDIOLOGY | Facility: CLINIC | Age: 78
End: 2023-03-14
Payer: MEDICARE

## 2023-03-14 NOTE — TELEPHONE ENCOUNTER
Patient called today she is needing clearance for a bladder implant done at the end of April.     Dr. Howard [phone]  Fax: [phone]

## 2023-03-14 NOTE — TELEPHONE ENCOUNTER
She has an appointment with Alyson in 2 weeks.  She will need to be seen before she is cleared as she does have severe aortic stenosis that has been asymptomatic.

## 2023-04-05 ENCOUNTER — OFFICE VISIT (OUTPATIENT)
Dept: CARDIOLOGY | Facility: CLINIC | Age: 78
End: 2023-04-05
Payer: MEDICARE

## 2023-04-05 VITALS
WEIGHT: 169 LBS | HEIGHT: 64 IN | DIASTOLIC BLOOD PRESSURE: 74 MMHG | SYSTOLIC BLOOD PRESSURE: 132 MMHG | HEART RATE: 97 BPM | BODY MASS INDEX: 28.85 KG/M2

## 2023-04-05 DIAGNOSIS — E78.5 HYPERLIPIDEMIA, UNSPECIFIED HYPERLIPIDEMIA TYPE: ICD-10-CM

## 2023-04-05 DIAGNOSIS — G47.33 OBSTRUCTIVE SLEEP APNEA: ICD-10-CM

## 2023-04-05 DIAGNOSIS — I35.0 AORTIC STENOSIS, SEVERE: Primary | ICD-10-CM

## 2023-04-05 DIAGNOSIS — I10 ESSENTIAL HYPERTENSION: ICD-10-CM

## 2023-04-05 DIAGNOSIS — I10 PRIMARY HYPERTENSION: ICD-10-CM

## 2023-04-05 DIAGNOSIS — E78.2 MIXED HYPERLIPIDEMIA: ICD-10-CM

## 2023-04-05 PROCEDURE — 99214 OFFICE O/P EST MOD 30 MIN: CPT | Performed by: NURSE PRACTITIONER

## 2023-04-05 PROCEDURE — 3075F SYST BP GE 130 - 139MM HG: CPT | Performed by: NURSE PRACTITIONER

## 2023-04-05 PROCEDURE — 93000 ELECTROCARDIOGRAM COMPLETE: CPT | Performed by: NURSE PRACTITIONER

## 2023-04-05 PROCEDURE — 3078F DIAST BP <80 MM HG: CPT | Performed by: NURSE PRACTITIONER

## 2023-04-05 RX ORDER — HYDROCHLOROTHIAZIDE 25 MG/1
25 TABLET ORAL DAILY
Qty: 90 TABLET | Refills: 3 | Status: SHIPPED | OUTPATIENT
Start: 2023-04-05

## 2023-04-05 RX ORDER — AMLODIPINE BESYLATE 5 MG/1
5 TABLET ORAL DAILY
Qty: 90 TABLET | Refills: 3 | Status: SHIPPED | OUTPATIENT
Start: 2023-04-05

## 2023-04-05 RX ORDER — HYDROXYUREA 500 MG/1
CAPSULE ORAL
COMMUNITY
Start: 2023-03-30

## 2023-04-05 RX ORDER — SIMVASTATIN 40 MG
40 TABLET ORAL NIGHTLY
Qty: 90 TABLET | Refills: 3 | Status: SHIPPED | OUTPATIENT
Start: 2023-04-05

## 2023-04-05 NOTE — PROGRESS NOTES
Date of Office Visit: 2023  Encounter Provider: ANN Daniel  Place of Service: [facility] CARDIOLOGY  Patient Name: Pooja Chau  :1945    No chief complaint on file.  : follow up    HPI: Pooja Chau is a 78 y.o. female who is a patient of Dr. Hester.  She is new to me today and presents for a 6-month office follow-up.  She has a history of severe degenerative aortic valve stenosis that has been asymptomatic.  She also has a history of hypertension, hyperlipidemia and obstructive sleep apnea.     On her last office visit with Dr. Hester in 2022, patient remained asymptomatic.  She denied any chest pain or dyspnea.  2D echocardiogram 10/2022 showed severe aortic valve stenosis with a mean gradient of 42 mmHg across the aortic valve and DIDI of 0.61 cm². These findings were similar to echocardiogram in .  Severity of aortic stenosis was confirmed on review of catheterization and echocardiogram.  She has normal left ventricular size and function and normal coronaries.    Patient had total knee replacement in 2022 with no issues.  Presents today with no complaints of chest pain, lightheadedness or lower extremity edema.  Lives in a fourth floor apartment and has noticed that the last few steps are getting cumbersome and she is becoming more short of breath she was previously.  Patient notes that she takes a nap around 2 PM once or twice a week.  This is a little bit more often than she was previously as well.  Physical exam, she appears euvolemic.  She does have 2 murmurs present.  EKG shows sinus rhythm with right bundle branch block and PVCs.  Patient states that she was diagnosed with sleep apnea many years ago however she does not wear her CPAP anymore because she did does not think she has it anymore.  Blood pressure is well controlled.  She is now following with Hematology due to increased white count and platelet count.  She  has been diagnosed with myeloproliferative neoplasm.  She was recently started on Hydrea 500 mg daily.    Previous testing and notes have been reviewed by me.   Past Medical History:   Diagnosis Date   • Aortic stenosis    • Back pain    • Bronchitis    • Claustrophobia    • Heart disease    • Hypertension    • Pneumonia        Past Surgical History:   Procedure Laterality Date   • CERVICAL DISCECTOMY ANTERIOR  10/2018    c4-c7 with fusion   • HYSTERECTOMY      at age 39   with BLSO   • KNEE SURGERY Bilateral     2x/s in right   1 x in left    arthroscopy     • SHOULDER SURGERY Left        Social History     Socioeconomic History   • Marital status:    • Number of children: 2   Tobacco Use   • Smoking status: Never   • Smokeless tobacco: Never   Vaping Use   • Vaping Use: Never used   Substance and Sexual Activity   • Alcohol use: Yes     Comment: VERY RARELY/ONCE A MONTH/socially   • Drug use: Never   • Sexual activity: Defer       Family History   Problem Relation Age of Onset   • Diabetes Mother         complications from DM   • Coronary artery disease Father    • Stroke Father    • Diabetes Maternal Grandmother    • Depression Sister    • Depression Brother        Review of Systems   Constitutional: Negative.   HENT: Negative.    Eyes: Negative.    Cardiovascular: Positive for dyspnea on exertion.   Respiratory: Negative.    Endocrine: Negative.    Hematologic/Lymphatic: Negative.         On hydrea   Skin: Negative.    Musculoskeletal: Negative.    Gastrointestinal: Negative.    Genitourinary: Negative.    Neurological: Negative.    Psychiatric/Behavioral: Negative.    Allergic/Immunologic: Negative.        Allergies   Allergen Reactions   • Nsaids Swelling   • Codeine Nausea Only         Current Outpatient Medications:   •  amLODIPine (NORVASC) 5 MG tablet, TAKE 1 TABLET BY MOUTH DAILY, Disp: 90 tablet, Rfl: 1  •  aspirin 81 MG chewable tablet, Chew 81 mg Daily., Disp: , Rfl:   •  cyclobenzaprine  (FLEXERIL) 10 MG tablet, Take 1 tablet by mouth Every 8 (Eight) Hours As Needed for Muscle Spasms., Disp: 90 tablet, Rfl: 1  •  FLUoxetine (PROzac) 20 MG capsule, TAKE 1 CAPSULE BY MOUTH DAILY, Disp: 90 capsule, Rfl: 0  •  FLUoxetine (PROzac) 40 MG capsule, Take 40 mg by mouth Daily As Needed. To take for seasonal depression. Once season depression has subsided, pt goes back down to 20mg daily., Disp: , Rfl:   •  fluticasone (FLONASE) 50 MCG/ACT nasal spray, 2 sprays into the nostril(s) as directed by provider Daily., Disp: 18.2 mL, Rfl: 0  •  hydroCHLOROthiazide (HYDRODIURIL) 25 MG tablet, TAKE 1 TABLET BY MOUTH DAILY, Disp: 90 tablet, Rfl: 1  •  hydrOXYzine (ATARAX) 25 MG tablet, Take 0.5 tablets by mouth Every 8 (Eight) Hours As Needed for Anxiety., Disp: 90 tablet, Rfl: 1  •  simvastatin (ZOCOR) 40 MG tablet, TAKE 1 TABLET BY MOUTH EVERY NIGHT, Disp: 90 tablet, Rfl: 1  •  Vibegron (Gemtesa) 75 MG tablet, Take 1 tablet by mouth Daily., Disp: 14 tablet, Rfl: 0      Objective:     There were no vitals filed for this visit.  There is no height or weight on file to calculate BMI.     2D Echocardiogram 10/3/2022:  • Peak velocity of the flow distal to the aortic valve is 407 cm/s. Aortic valve maximum pressure gradient is 66.3 mmHg. Aortic valve mean pressure gradient is 42 mmHg. Aortic valve dimensionless index is 0.2 .  • Severe aortic valve stenosis is present. Aortic valve area is 0.61 cm2.  • Estimated right ventricular systolic pressure from tricuspid regurgitation is normal (<35 mmHg). Calculated right ventricular systolic pressure from tricuspid regurgitation is 15 mmHg.  • Left ventricular wall thickness is consistent with mild to moderate concentric hypertrophy.  • Estimated left ventricular EF = 64% Left ventricular systolic function is normal.  • Left ventricular diastolic function was normal.    2D Echocardiogram 7/1/2021 ([facility]):  Normal LVEF and global LV  systolic function  LV size normal  LVEF 60 to 65%  Normal LV filling pressures  Normal pattern of LV diastolic filling  Severe aortic valve stenosis.  Peak velocity 4.2 m/s, mean gradient 36 mmHg and DIDI 0.6 cm²  Normal pulmonary artery systolic pressure  Mitral valve normal in structure, no mitral stenosis, trace mitral regurgitation    Left/right heart cath 10/4/2019  ([facility] in [place], [place]):  LVEF 60%  Catheter induced spasm of proximal RCA-no residual stenosis  Severe AS with AMAYA demonstrating mean/peak gradient of 71/41 mmHg.    Simultaneous LV-AO  Mean gradient 41 consistent with severe AS, calculated DIDI 0.96  Right heart hemodynamics acceptable      PHYSICAL EXAM:    Constitutional:       Appearance: Healthy appearance. Not in distress.   Neck:      Vascular: No JVR. JVD normal.   Pulmonary:      Effort: Pulmonary effort is normal.      Breath sounds: Normal breath sounds. No wheezing. No rhonchi. No rales.   Chest:      Chest wall: Not tender to palpatation.   Cardiovascular:      PMI at left midclavicular line. Normal rate. Regular rhythm. Normal S1. Normal S2.      Murmurs: There is a systolic murmur.      No gallop. No click. No rub.      Comments: Two murmurs present  Pulses:     Intact distal pulses.   Edema:     Peripheral edema absent.   Abdominal:      General: Bowel sounds are normal.      Palpations: Abdomen is soft.      Tenderness: There is no abdominal tenderness.   Musculoskeletal: Normal range of motion.         General: No tenderness. Skin:     General: Skin is warm and dry.   Neurological:      General: No focal deficit present.      Mental Status: Alert and oriented to person, place and time.           ECG 12 Lead    Date/Time: 4/5/2023 11:39 AM  Performed by: Nu Bowser APRN  Authorized by: Nu Bowser APRN   Comparison: compared with previous ECG from 10/3/2022  Similar to previous ECG  Rhythm: sinus rhythm  Ectopy: multifocal PVCs  BPM:  97  Conduction: right bundle branch block              Assessment:       Diagnosis Plan   1. Aortic stenosis, severe        2. Mixed hyperlipidemia        3. Essential hypertension        4. Obstructive sleep apnea          No orders of the defined types were placed in this encounter.         Plan:       1.  Severe degenerative aortic stenosis: Positive murmur.  Starting to notice dyspnea on exertion and napping a little more than prior.  Euvolemic on exam.  2D echocardiogram in the next couple of weeks.  2.  Hypertension: Well-controlled  3.  Hyperlipidemia: Most recent lipid panel 11/2022 shows total cholesterol 213, triglycerides 109, HDL 71 and .  On statin therapy  4.  Obstructive sleep apnea: Previously diagnosed with obstructive sleep apnea and wore a CPAP.  Patient stopped wearing CPAP because she thought that she did not have sleep apnea anymore.  5.  Right bundle branch block    Patient will schedule a 2D echocardiogram at a [facility] facility.  Has been placed.  She will follow-up with Dr. Hester in 6 months and call sooner for any questions or concerns.     Your medication list          Accurate as of April 5, 2023  9:10 AM. If you have any questions, ask your nurse or doctor.            CONTINUE taking these medications      Instructions Last Dose Given Next Dose Due   amLODIPine 5 MG tablet  Commonly known as: NORVASC      TAKE 1 TABLET BY MOUTH DAILY       aspirin 81 MG chewable tablet      Chew 81 mg Daily.       cyclobenzaprine 10 MG tablet  Commonly known as: FLEXERIL      Take 1 tablet by mouth Every 8 (Eight) Hours As Needed for Muscle Spasms.       FLUoxetine 40 MG capsule  Commonly known as: PROzac      Take 40 mg by mouth Daily As Needed. To take for seasonal depression. Once season depression has subsided, pt goes back down to 20mg daily.       FLUoxetine 20 MG capsule  Commonly known as: PROzac      TAKE 1 CAPSULE BY MOUTH DAILY       fluticasone 50 MCG/ACT nasal spray  Commonly known  as: FLONASE      2 sprays into the nostril(s) as directed by provider Daily.       Gemtesa 75 MG tablet  Generic drug: Vibegron      Take 1 tablet by mouth Daily.       hydroCHLOROthiazide 25 MG tablet  Commonly known as: HYDRODIURIL      TAKE 1 TABLET BY MOUTH DAILY       hydrOXYzine 25 MG tablet  Commonly known as: ATARAX      Take 0.5 tablets by mouth Every 8 (Eight) Hours As Needed for Anxiety.       simvastatin 40 MG tablet  Commonly known as: ZOCOR      TAKE 1 TABLET BY MOUTH EVERY NIGHT                As always, it has been a pleasure to participate in your patient's care.      Sincerely,       ANN Tavares

## 2023-04-17 DIAGNOSIS — F33.2 RECURRENT SEASONAL MAJOR DEPRESSION, SEVERE: ICD-10-CM

## 2023-04-18 RX ORDER — FLUOXETINE HYDROCHLORIDE 20 MG/1
CAPSULE ORAL
Qty: 90 CAPSULE | Refills: 0 | Status: SHIPPED | OUTPATIENT
Start: 2023-04-18

## 2023-05-16 ENCOUNTER — HOSPITAL ENCOUNTER (OUTPATIENT)
Dept: CARDIOLOGY | Facility: HOSPITAL | Age: 78
Discharge: HOME | End: 2023-05-16
Payer: MEDICARE

## 2023-05-16 DIAGNOSIS — E78.5 HYPERLIPIDEMIA, UNSPECIFIED HYPERLIPIDEMIA TYPE: ICD-10-CM

## 2023-05-16 DIAGNOSIS — E78.2 MIXED HYPERLIPIDEMIA: ICD-10-CM

## 2023-05-16 DIAGNOSIS — G47.33 OBSTRUCTIVE SLEEP APNEA: ICD-10-CM

## 2023-05-16 DIAGNOSIS — I10 ESSENTIAL HYPERTENSION: ICD-10-CM

## 2023-05-16 DIAGNOSIS — I35.0 AORTIC STENOSIS, SEVERE: ICD-10-CM

## 2023-05-16 DIAGNOSIS — I10 PRIMARY HYPERTENSION: ICD-10-CM

## 2023-05-16 PROCEDURE — 93306 TTE W/DOPPLER COMPLETE: CPT

## 2023-05-20 LAB
BH CV ECHO MEAS - AO MAX PG: 64 MMHG
BH CV ECHO MEAS - AO MEAN PG: 34 MMHG
BH CV ECHO MEAS - AO ROOT DIAM: 2.5 CM
BH CV ECHO MEAS - AO V2 MAX: 401 CM/SEC
BH CV ECHO MEAS - AO V2 VTI: 71.4 CM
BH CV ECHO MEAS - AVA(I,D): 0.5 CM2
BH CV ECHO MEAS - EDV(CUBED): 85.2 ML
BH CV ECHO MEAS - EDV(MOD-SP2): 62.6 ML
BH CV ECHO MEAS - EDV(MOD-SP4): 62.3 ML
BH CV ECHO MEAS - EF(MOD-BP): 61.4 %
BH CV ECHO MEAS - EF(MOD-SP2): 61 %
BH CV ECHO MEAS - EF(MOD-SP4): 60.4 %
BH CV ECHO MEAS - ESV(CUBED): 27 ML
BH CV ECHO MEAS - ESV(MOD-SP2): 24.4 ML
BH CV ECHO MEAS - ESV(MOD-SP4): 24.7 ML
BH CV ECHO MEAS - FS: 31.8 %
BH CV ECHO MEAS - IVS/LVPW: 1.3 CM
BH CV ECHO MEAS - IVSD: 1.3 CM
BH CV ECHO MEAS - LA DIMENSION: 3.6 CM
BH CV ECHO MEAS - LAT PEAK E' VEL: 9.2 CM/SEC
BH CV ECHO MEAS - LV MASS(C)D: 180 GRAMS
BH CV ECHO MEAS - LV MAX PG: 3.4 MMHG
BH CV ECHO MEAS - LV MEAN PG: 2 MMHG
BH CV ECHO MEAS - LV V1 MAX: 91.7 CM/SEC
BH CV ECHO MEAS - LV V1 VTI: 17.8 CM
BH CV ECHO MEAS - LVIDD: 4.4 CM
BH CV ECHO MEAS - LVIDS: 3 CM
BH CV ECHO MEAS - LVOT AREA: 2.01 CM2
BH CV ECHO MEAS - LVOT DIAM: 1.6 CM
BH CV ECHO MEAS - LVPWD: 1 CM
BH CV ECHO MEAS - MED PEAK E' VEL: 6.3 CM/SEC
BH CV ECHO MEAS - MV MEAN PG: 4 MMHG
BH CV ECHO MEAS - MV V2 VTI: 37.7 CM
BH CV ECHO MEAS - MVA(VTI): 0.95 CM2
BH CV ECHO MEAS - RVDD: 2.9 CM
BH CV ECHO MEAS - SV(LVOT): 35.8 ML
BH CV ECHO MEAS - SV(MOD-SP2): 38.2 ML
BH CV ECHO MEAS - SV(MOD-SP4): 37.6 ML
LEFT ATRIUM VOLUME INDEX: 15.5 ML/M2
LEFT ATRIUM VOLUME: 28.2 ML
MAXIMAL PREDICTED HEART RATE: 141 BPM
STRESS TARGET HR: 120 BPM

## 2023-05-24 ENCOUNTER — OFFICE VISIT (OUTPATIENT)
Dept: FAMILY MEDICINE CLINIC | Age: 78
End: 2023-05-24

## 2023-05-24 VITALS
DIASTOLIC BLOOD PRESSURE: 78 MMHG | SYSTOLIC BLOOD PRESSURE: 158 MMHG | HEART RATE: 85 BPM | BODY MASS INDEX: 29.4 KG/M2 | WEIGHT: 172.2 LBS | OXYGEN SATURATION: 96 % | HEIGHT: 64 IN

## 2023-05-24 DIAGNOSIS — Z13.6 SCREENING FOR CARDIOVASCULAR CONDITION: ICD-10-CM

## 2023-05-24 DIAGNOSIS — E78.5 HYPERLIPIDEMIA, UNSPECIFIED HYPERLIPIDEMIA TYPE: ICD-10-CM

## 2023-05-24 DIAGNOSIS — I10 PRIMARY HYPERTENSION: Primary | ICD-10-CM

## 2023-05-24 PROBLEM — D47.1 MYELOPROLIFERATIVE DISORDER: Status: ACTIVE | Noted: 2023-03-30

## 2023-05-24 PROBLEM — D47.3 ESSENTIAL THROMBOCYTHEMIA: Status: ACTIVE | Noted: 2023-03-30

## 2023-05-24 PROBLEM — D50.8 IRON DEFICIENCY ANEMIA SECONDARY TO INADEQUATE DIETARY IRON INTAKE: Status: ACTIVE | Noted: 2023-04-13

## 2023-05-24 PROCEDURE — 3078F DIAST BP <80 MM HG: CPT | Performed by: NURSE PRACTITIONER

## 2023-05-24 PROCEDURE — 3077F SYST BP >= 140 MM HG: CPT | Performed by: NURSE PRACTITIONER

## 2023-05-24 PROCEDURE — 99214 OFFICE O/P EST MOD 30 MIN: CPT | Performed by: NURSE PRACTITIONER

## 2023-05-24 RX ORDER — LISINOPRIL 40 MG/1
TABLET ORAL
COMMUNITY

## 2023-05-24 RX ORDER — HYDROCHLOROTHIAZIDE 25 MG/1
25 TABLET ORAL DAILY
Qty: 90 TABLET | Refills: 3 | Status: SHIPPED | OUTPATIENT
Start: 2023-05-24

## 2023-05-24 RX ORDER — SIMVASTATIN 40 MG
40 TABLET ORAL NIGHTLY
Qty: 90 TABLET | Refills: 3 | Status: SHIPPED | OUTPATIENT
Start: 2023-05-24

## 2023-05-24 RX ORDER — BUPROPION HYDROCHLORIDE 150 MG/1
TABLET, EXTENDED RELEASE ORAL
Status: ON HOLD | COMMUNITY
End: 2023-06-02

## 2023-05-24 RX ORDER — AMLODIPINE BESYLATE 5 MG/1
5 TABLET ORAL DAILY
Qty: 90 TABLET | Refills: 3 | Status: SHIPPED | OUTPATIENT
Start: 2023-05-24

## 2023-05-24 RX ORDER — ESTRADIOL 0.1 MG/G
1 CREAM VAGINAL
COMMUNITY
Start: 2023-02-06 | End: 2023-05-24

## 2023-05-24 RX ORDER — FLUOXETINE HYDROCHLORIDE 20 MG/1
20 CAPSULE ORAL DAILY
COMMUNITY
Start: 2023-01-16 | End: 2023-05-24 | Stop reason: SDUPTHER

## 2023-05-24 NOTE — PROGRESS NOTES
Chief Complaint  Pooja Chau presents to [facility] for Hypertension      Subjective     History of Present Illness  Pooja presents today for follow up on Hypertension.    Current medication / treatment includes lisinopril (generic), amlodipine and hydrochlorothiazide.    Reported as BP checks at home: It is well controlled when checked. and home BP readings are in the 130's/80's range.    Cardiac symptoms none.  The 10-year ASCVD risk score (Cristiana ÁLVAREZ, et al., 2019) is: 46.5%    Values used to calculate the score:      Age: 78 years      Sex: Female      Is Non- : No      Diabetic: No      Tobacco smoker: No      Systolic Blood Pressure: 162 mmHg      Is BP treated: Yes      HDL Cholesterol: 71 mg/dL      Total Cholesterol: 213 mg/dL     Pooja presents today for follow up on hyperlipidemia.  Previous values: Lab Results       Component                Value               Date                       CHOL                     213 (H)             11/21/2022                 CHLPL                    205 (H)             05/31/2023                 TRIG                     134                 05/31/2023                 HDL                      76                  05/31/2023                 LDL                      106 (H)             05/31/2023           ;    Current CVD 10yr risk is The 10-year ASCVD risk score (Cristiana ÁLVAREZ, et al., 2019) is: 30.5%    Values used to calculate the score:      Age: 78 years      Sex: Female      Is Non- : No      Diabetic: No      Tobacco smoker: No      Systolic Blood Pressure: 123 mmHg      Is BP treated: Yes      HDL Cholesterol: 76 mg/dL      Total Cholesterol: 205 mg/dL ;    Pooja reports compliant with medication which is simvastatin (Zocor)    No side effects reported from this medication .   No new concerns to discuss today.        Assessment and Plan       Diagnoses and all orders for this  visit:    1. Primary hypertension (Primary)  Comments:  Elevated today continue to monitor at home if remains elevated at today's level we will need medication adjustment  Orders:  -     hydroCHLOROthiazide (HYDRODIURIL) 25 MG tablet; Take 1 tablet by mouth Daily.  Dispense: 90 tablet; Refill: 3  -     amLODIPine (NORVASC) 5 MG tablet; Take 1 tablet by mouth Daily.  Dispense: 90 tablet; Refill: 3    2. Screening for cardiovascular condition  -     Lipid panel; Future  -     Comprehensive metabolic panel; Future    3. Hyperlipidemia, unspecified hyperlipidemia type  Comments:  Continue current treatment follow-up in 6 months labs with next visit  Orders:  -     simvastatin (ZOCOR) 40 MG tablet; Take 1 tablet by mouth Every Night.  Dispense: 90 tablet; Refill: 3        Follow Up   Return in about 3 months (around 8/24/2023) for Recheck.      New Medications Ordered This Visit   Medications   • hydroCHLOROthiazide (HYDRODIURIL) 25 MG tablet     Sig: Take 1 tablet by mouth Daily.     Dispense:  90 tablet     Refill:  3   • amLODIPine (NORVASC) 5 MG tablet     Sig: Take 1 tablet by mouth Daily.     Dispense:  90 tablet     Refill:  3   • simvastatin (ZOCOR) 40 MG tablet     Sig: Take 1 tablet by mouth Every Night.     Dispense:  90 tablet     Refill:  3       Medications Discontinued During This Encounter   Medication Reason   • FLUoxetine (PROzac) 20 MG capsule Duplicate order   • estradiol (ESTRACE) 0.1 MG/GM vaginal cream *Therapy completed   • Vibegron (Gemtesa) 75 MG tablet Non-compliance   • cyclobenzaprine (FLEXERIL) 10 MG tablet *Therapy completed   • amLODIPine (NORVASC) 5 MG tablet Reorder   • hydroCHLOROthiazide (HYDRODIURIL) 25 MG tablet Reorder   • simvastatin (ZOCOR) 40 MG tablet Reorder            Review of Systems    Objective     Vitals:    05/24/23 1125 05/24/23 1155   BP: 162/73 158/78   BP Location: Left arm Left arm   Patient Position: Sitting    Pulse: 85    SpO2: 96%    Weight: 78.1 kg (172 lb 3.2  "oz)    Height: 162.6 cm (64.02\")      Body mass index is 29.54 kg/m².     Physical Exam       Result Review                       Allergies   Allergen Reactions   • Nsaids Swelling   • Codeine Nausea Only      Past Medical History:   Diagnosis Date   • Aortic stenosis    • Back pain    • Bronchitis    • Claustrophobia    • Heart disease    • Hypertension    • Pneumonia      Current Outpatient Medications   Medication Sig Dispense Refill   • amLODIPine (NORVASC) 5 MG tablet Take 1 tablet by mouth Daily. 90 tablet 3   • aspirin 81 MG chewable tablet Chew 1 tablet Daily.     • FLUoxetine (PROzac) 20 MG capsule TAKE 1 CAPSULE BY MOUTH DAILY 90 capsule 0   • hydroCHLOROthiazide (HYDRODIURIL) 25 MG tablet Take 1 tablet by mouth Daily. 90 tablet 3   • hydroxyurea (HYDREA) 500 MG capsule TAKE 1 TABLET EVERY OTHER DAY     • hydrOXYzine (ATARAX) 25 MG tablet Take 0.5 tablets by mouth Every 8 (Eight) Hours As Needed for Anxiety. 90 tablet 1   • lisinopril (PRINIVIL,ZESTRIL) 40 MG tablet lisinopril 40 mg tablet     • simvastatin (ZOCOR) 40 MG tablet Take 1 tablet by mouth Every Night. 90 tablet 3   • cyclobenzaprine (FLEXERIL) 10 MG tablet Take 1 tablet by mouth 3 (Three) Times a Day As Needed for Muscle Spasms.     • MAGNESIUM PO Take 500 mg by mouth.     • NON FORMULARY Lutine eye vitamin       No current facility-administered medications for this visit.     Past Surgical History:   Procedure Laterality Date   • CERVICAL DISCECTOMY ANTERIOR  10/2018    c4-c7 with fusion   • HYSTERECTOMY      at age 39   with BLSO   • KNEE SURGERY Bilateral     2x/s in right   1 x in left    arthroscopy     • SHOULDER SURGERY Left       Health Maintenance Due   Topic Date Due   • DXA SCAN  Never done   • TDAP/TD VACCINES (1 - Tdap) Never done   • ZOSTER VACCINE (2 of 2) 08/15/2020   • COVID-19 Vaccine (5 - Booster for Moderna series) 05/13/2022      Immunization History   Administered Date(s) Administered   • COVID-19 (MODERNA) 1st,2nd,3rd " Dose Monovalent 09/21/2021, 09/30/2021   • COVID-19 (MODERNA) Monovalent Original Booster 03/18/2022   • COVID-19 (UNSPECIFIED) 03/18/2022   • Fluzone High Dose =>65 Years ([employer] ONLY) 09/16/2021, 10/12/2022   • Fluzone High-Dose 65+yrs 09/16/2021, 10/12/2022   • Pneumococcal Conjugate 20-Valent (PCV20) 11/21/2022   • Shingrix 06/20/2020         Part of this note may be an electronic transcription/translation of spoken language to printed   text using the Dragon Dictation System.      Risa Borrego, APRN

## 2023-05-26 ENCOUNTER — TELEPHONE (OUTPATIENT)
Dept: CARDIOLOGY | Facility: CLINIC | Age: 78
End: 2023-05-26
Payer: MEDICARE

## 2023-05-26 DIAGNOSIS — I35.0 AORTIC STENOSIS, SEVERE: Primary | ICD-10-CM

## 2023-05-26 NOTE — TELEPHONE ENCOUNTER
Called and spoke with patient regarding proceeding with left heart cath to further assess aortic valve measurements and as part of TAVR work-up.  She is agreeable to proceed.  Order placed

## 2023-05-30 ENCOUNTER — TRANSCRIBE ORDERS (OUTPATIENT)
Dept: CARDIOLOGY | Facility: CLINIC | Age: 78
End: 2023-05-30

## 2023-05-30 DIAGNOSIS — Z01.810 PRE-OPERATIVE CARDIOVASCULAR EXAMINATION: Primary | ICD-10-CM

## 2023-05-30 DIAGNOSIS — Z13.6 SCREENING FOR ISCHEMIC HEART DISEASE: ICD-10-CM

## 2023-05-31 ENCOUNTER — LAB (OUTPATIENT)
Dept: LAB | Facility: HOSPITAL | Age: 78
End: 2023-05-31

## 2023-05-31 DIAGNOSIS — Z01.810 PRE-OPERATIVE CARDIOVASCULAR EXAMINATION: ICD-10-CM

## 2023-05-31 DIAGNOSIS — Z13.6 SCREENING FOR ISCHEMIC HEART DISEASE: ICD-10-CM

## 2023-05-31 LAB
BASOPHILS # BLD AUTO: 0.06 10*3/MM3 (ref 0–0.2)
BASOPHILS NFR BLD AUTO: 0.5 % (ref 0–1.5)
DEPRECATED RDW RBC AUTO: 51.2 FL (ref 37–54)
EOSINOPHIL # BLD AUTO: 0.2 10*3/MM3 (ref 0–0.4)
EOSINOPHIL NFR BLD AUTO: 1.8 % (ref 0.3–6.2)
ERYTHROCYTE [DISTWIDTH] IN BLOOD BY AUTOMATED COUNT: 15.7 % (ref 12.3–15.4)
HCT VFR BLD AUTO: 43.8 % (ref 34–46.6)
HGB BLD-MCNC: 14.3 G/DL (ref 12–15.9)
IMM GRANULOCYTES # BLD AUTO: 0.03 10*3/MM3 (ref 0–0.05)
IMM GRANULOCYTES NFR BLD AUTO: 0.3 % (ref 0–0.5)
LYMPHOCYTES # BLD AUTO: 2.84 10*3/MM3 (ref 0.7–3.1)
LYMPHOCYTES NFR BLD AUTO: 25.7 % (ref 19.6–45.3)
MCH RBC QN AUTO: 29.5 PG (ref 26.6–33)
MCHC RBC AUTO-ENTMCNC: 32.6 G/DL (ref 31.5–35.7)
MCV RBC AUTO: 90.5 FL (ref 79–97)
MONOCYTES # BLD AUTO: 0.75 10*3/MM3 (ref 0.1–0.9)
MONOCYTES NFR BLD AUTO: 6.8 % (ref 5–12)
NEUTROPHILS NFR BLD AUTO: 64.9 % (ref 42.7–76)
NEUTROPHILS NFR BLD AUTO: 7.17 10*3/MM3 (ref 1.7–7)
NRBC BLD AUTO-RTO: 0 /100 WBC (ref 0–0.2)
PLATELET # BLD AUTO: 503 10*3/MM3 (ref 140–450)
PMV BLD AUTO: 12.2 FL (ref 6–12)
RBC # BLD AUTO: 4.84 10*6/MM3 (ref 3.77–5.28)
WBC NRBC COR # BLD: 11.05 10*3/MM3 (ref 3.4–10.8)

## 2023-05-31 PROCEDURE — 85025 COMPLETE CBC W/AUTO DIFF WBC: CPT

## 2023-05-31 PROCEDURE — 80061 LIPID PANEL: CPT | Performed by: NURSE PRACTITIONER

## 2023-05-31 PROCEDURE — 80053 COMPREHEN METABOLIC PANEL: CPT | Performed by: NURSE PRACTITIONER

## 2023-06-02 ENCOUNTER — HOSPITAL ENCOUNTER (OUTPATIENT)
Facility: HOSPITAL | Age: 78
Setting detail: HOSPITAL OUTPATIENT SURGERY
Discharge: HOME | End: 2023-06-02
Attending: INTERNAL MEDICINE | Admitting: INTERNAL MEDICINE
Payer: MEDICARE

## 2023-06-02 ENCOUNTER — DOCUMENTATION (OUTPATIENT)
Dept: CARDIOLOGY | Facility: HOSPITAL | Age: 78
End: 2023-06-02

## 2023-06-02 VITALS
SYSTOLIC BLOOD PRESSURE: 123 MMHG | HEART RATE: 84 BPM | BODY MASS INDEX: 28.51 KG/M2 | DIASTOLIC BLOOD PRESSURE: 65 MMHG | HEIGHT: 64 IN | TEMPERATURE: 98.7 F | WEIGHT: 167 LBS | OXYGEN SATURATION: 94 % | RESPIRATION RATE: 20 BRPM

## 2023-06-02 DIAGNOSIS — I35.0 AORTIC STENOSIS, SEVERE: Primary | ICD-10-CM

## 2023-06-02 DIAGNOSIS — I35.0 AORTIC STENOSIS, SEVERE: ICD-10-CM

## 2023-06-02 PROCEDURE — 93454 CORONARY ARTERY ANGIO S&I: CPT | Performed by: INTERNAL MEDICINE

## 2023-06-02 PROCEDURE — C1769 GUIDE WIRE: HCPCS | Performed by: INTERNAL MEDICINE

## 2023-06-02 PROCEDURE — 25010000002 FENTANYL CITRATE (PF) 50 MCG/ML SOLUTION: Performed by: INTERNAL MEDICINE

## 2023-06-02 PROCEDURE — 25510000001 IOPAMIDOL PER 1 ML: Performed by: INTERNAL MEDICINE

## 2023-06-02 PROCEDURE — C1894 INTRO/SHEATH, NON-LASER: HCPCS | Performed by: INTERNAL MEDICINE

## 2023-06-02 PROCEDURE — 25010000002 HEPARIN (PORCINE) PER 1000 UNITS: Performed by: INTERNAL MEDICINE

## 2023-06-02 PROCEDURE — 25010000002 MIDAZOLAM PER 1 MG: Performed by: INTERNAL MEDICINE

## 2023-06-02 RX ORDER — ACETAMINOPHEN 325 MG/1
650 TABLET ORAL EVERY 4 HOURS PRN
Status: DISCONTINUED | OUTPATIENT
Start: 2023-06-02 | End: 2023-06-02 | Stop reason: HOSPADM

## 2023-06-02 RX ORDER — SODIUM CHLORIDE 9 MG/ML
75 INJECTION, SOLUTION INTRAVENOUS CONTINUOUS
Status: DISCONTINUED | OUTPATIENT
Start: 2023-06-02 | End: 2023-06-02 | Stop reason: HOSPADM

## 2023-06-02 RX ORDER — SODIUM CHLORIDE 0.9 % (FLUSH) 0.9 %
10 SYRINGE (ML) INJECTION AS NEEDED
Status: DISCONTINUED | OUTPATIENT
Start: 2023-06-02 | End: 2023-06-02 | Stop reason: HOSPADM

## 2023-06-02 RX ORDER — SODIUM CHLORIDE 9 MG/ML
100 INJECTION, SOLUTION INTRAVENOUS CONTINUOUS
Status: DISCONTINUED | OUTPATIENT
Start: 2023-06-02 | End: 2023-06-02 | Stop reason: HOSPADM

## 2023-06-02 RX ORDER — VERAPAMIL HYDROCHLORIDE 2.5 MG/ML
INJECTION, SOLUTION INTRAVENOUS
Status: DISCONTINUED | OUTPATIENT
Start: 2023-06-02 | End: 2023-06-02 | Stop reason: HOSPADM

## 2023-06-02 RX ORDER — HEPARIN SODIUM 1000 [USP'U]/ML
INJECTION, SOLUTION INTRAVENOUS; SUBCUTANEOUS
Status: DISCONTINUED | OUTPATIENT
Start: 2023-06-02 | End: 2023-06-02 | Stop reason: HOSPADM

## 2023-06-02 RX ORDER — LIDOCAINE HYDROCHLORIDE 20 MG/ML
INJECTION, SOLUTION INFILTRATION; PERINEURAL
Status: DISCONTINUED | OUTPATIENT
Start: 2023-06-02 | End: 2023-06-02 | Stop reason: HOSPADM

## 2023-06-02 RX ORDER — MIDAZOLAM HYDROCHLORIDE 1 MG/ML
INJECTION INTRAMUSCULAR; INTRAVENOUS
Status: DISCONTINUED | OUTPATIENT
Start: 2023-06-02 | End: 2023-06-02 | Stop reason: HOSPADM

## 2023-06-02 RX ORDER — SODIUM CHLORIDE 9 MG/ML
40 INJECTION, SOLUTION INTRAVENOUS AS NEEDED
Status: DISCONTINUED | OUTPATIENT
Start: 2023-06-02 | End: 2023-06-02 | Stop reason: HOSPADM

## 2023-06-02 RX ORDER — ONDANSETRON 4 MG/1
4 TABLET, FILM COATED ORAL EVERY 6 HOURS PRN
Status: DISCONTINUED | OUTPATIENT
Start: 2023-06-02 | End: 2023-06-02 | Stop reason: HOSPADM

## 2023-06-02 RX ORDER — HYDROCODONE BITARTRATE AND ACETAMINOPHEN 5; 325 MG/1; MG/1
1 TABLET ORAL EVERY 4 HOURS PRN
Status: DISCONTINUED | OUTPATIENT
Start: 2023-06-02 | End: 2023-06-02 | Stop reason: HOSPADM

## 2023-06-02 RX ORDER — FENTANYL CITRATE 50 UG/ML
INJECTION, SOLUTION INTRAMUSCULAR; INTRAVENOUS
Status: DISCONTINUED | OUTPATIENT
Start: 2023-06-02 | End: 2023-06-02 | Stop reason: HOSPADM

## 2023-06-02 RX ORDER — SODIUM CHLORIDE 0.9 % (FLUSH) 0.9 %
10 SYRINGE (ML) INJECTION EVERY 12 HOURS SCHEDULED
Status: DISCONTINUED | OUTPATIENT
Start: 2023-06-02 | End: 2023-06-02 | Stop reason: HOSPADM

## 2023-06-02 RX ORDER — NITROGLYCERIN 0.4 MG/1
0.4 TABLET SUBLINGUAL
Status: DISCONTINUED | OUTPATIENT
Start: 2023-06-02 | End: 2023-06-02 | Stop reason: HOSPADM

## 2023-06-02 RX ORDER — CYCLOBENZAPRINE HCL 10 MG
10 TABLET ORAL 3 TIMES DAILY PRN
COMMUNITY

## 2023-06-02 RX ORDER — ONDANSETRON 2 MG/ML
4 INJECTION INTRAMUSCULAR; INTRAVENOUS EVERY 6 HOURS PRN
Status: DISCONTINUED | OUTPATIENT
Start: 2023-06-02 | End: 2023-06-02 | Stop reason: HOSPADM

## 2023-06-02 RX ADMIN — SODIUM CHLORIDE 75 ML/HR: 9 INJECTION, SOLUTION INTRAVENOUS at 11:34

## 2023-06-02 NOTE — SIGNIFICANT NOTE
Dr. Hester at bedside with patient discussing results of procedure and plan re: TAVR.  Dr. Hester spoke with daughter via telephone

## 2023-06-02 NOTE — DISCHARGE INSTRUCTIONS
[facility]  [address] [place] [address]    Coronary Angiogram (Radial/Ulnar Approach) After Care    Refer to this sheet in the next few weeks. These instructions provide you with information on caring for yourself after your procedure. Your caregiver may also give you more specific instructions. Your treatment has been planned according to current medical practices, but problems sometimes occur. Call your caregiver if you have any problems or questions after your procedure.    Home Care Instructions:  You may shower the day after the procedure. Remove the bandage (dressing) and gently wash the site with plain soap and water. Gently pat the site dry. You may apply a band aid daily for 2 days if desired.    Do not apply powder or lotion to the site.  Do not submerge the affected site in water for 3 to 5 days or until the site is completely healed.   Do not lift, push or pull anything over 5 pounds for 5 days after your procedure or as directed by your physician.  As a reference, a gallon of milk weighs 8 pounds.   Inspect the site at least twice daily. You may notice some bruising at the site and it may be tender for 1 to 2 weeks.     Increase your fluid intake for the next 2 days.    Keep arm elevated for 24 hours. For the remainder of the day, keep your arm in “Pledge of Allegiance” position when up and about.     You may drive 24 hours after the procedure unless otherwise instructed by your caregiver.  Do not operate machinery or power tools for 24 hours.  A responsible adult should be with you for the first 24 hours after you arrive home. Do not make any important legal decisions or sign legal papers for 24 hours.  Do not drink alcohol for 24 hours.        Call Your Doctor if:   You have unusual pain at the radial/ulnar (wrist) site.  You have redness, warmth, swelling, or pain at the radial/ulnar (wrist) site.  You have drainage (other than a small amount of blood on the dressing).  `You  have chills or a fever > 101.  Your arm becomes pale or dark, cool, tingly, or numb.  You develop chest pain, shortness of breath, feel faint or pass out.    You have heavy bleeding from the site, hold pressure on the site for 20 minutes.  If the bleeding stops, apply a fresh bandage and call your cardiologist.  However, if you continue to have bleeding, call 911 and continue to apply pressure to the site.   You have any symptoms of a stroke.  Remember BE FAST  B-balance. Sudden trouble walking or loss of balance.  E-eyes.  Sudden changes in how you see or a sudden onset of a very bad headache.   F-face. Sudden weakness or loss of feeling of the face or facial droop on one side.   A-arms Sudden weakness or numbness in one arm.  One arm drifts down if they are both held out in front of you. This happens suddenly and usually on one side of the body.   S-speech.  Sudden trouble speaking, slurred speech or trouble understanding what are saying.   T-time  Time to call emergency services.  Write down the symptoms and the time they started.

## 2023-06-02 NOTE — Clinical Note
Hemostasis started on the right radial artery. Manual pressure applied to vessel. Manual pressure was held by BR, RTR. Manual pressure was held for 5 min. Hemostasis achieved successfully. Closure device additional comment: Gauze/tensoplast tape

## 2023-06-02 NOTE — NURSING NOTE
I met with Ms Chau after her cardiac cath with Dr Hester. I introduced the structural heart program and we briefly discussed the evaluation process I have scheduled an appointment with Dr Neumann 6/16 at 11:30 which is agreeable to her. We will work on scheduling a TAVR CTA same day I was able to answer her questions and have provided our contact information should she have any further questions

## 2023-06-02 NOTE — Clinical Note
A 6 fr sheath was  inserted with ultrasound guidance into the right radial artery. Sheath insertion delayed.

## 2023-06-06 NOTE — H&P
Date of Hospital Visit: 23   Encounter Provider: Tanner Hester MD  Place of Service: [facility] CARDIOLOGY  Patient Name: Pooja Chau  :1945        Chief complaint severe degenerative aortic valve stenosis.    History of Present Illness  78-year-old female who presents today for left right heart catheterization secondary to severe degenerative aortic valve stenosis this becomes symptomatic.  Plan on coronary angiography and right heart catheterization.  We do not need simultaneous pressures today.  Echocardiogram images and report have been reviewed and patient clearly has severe AS.    Past Medical History:   Diagnosis Date    Aortic stenosis     Back pain     Bronchitis     Claustrophobia     Heart disease     Hypertension     Pneumonia        Past Surgical History:   Procedure Laterality Date    CARDIAC CATHETERIZATION N/A 2023    Procedure: Left Heart Cath;  Surgeon: Tanner Hester MD;  Location: [facility] CATH INVASIVE LOCATION;  Service: Cardiology;  Laterality: N/A;  TAVR work up    CERVICAL DISCECTOMY ANTERIOR  10/2018    c4-c7 with fusion    HYSTERECTOMY      at age 39   with BLSO    KNEE SURGERY Bilateral     2x/s in right   1 x in left    arthroscopy      SHOULDER SURGERY Left        No medications prior to admission.         PRN Meds:.    Allergies as of 2023 - Reviewed 2023   Allergen Reaction Noted    Nsaids Swelling 2016    Codeine Nausea Only 2022       Social History     Socioeconomic History    Marital status:     Number of children: 2   Tobacco Use    Smoking status: Never    Smokeless tobacco: Never   Vaping Use    Vaping Use: Never used   Substance and Sexual Activity    Alcohol use: Yes     Comment: VERY RARELY/ONCE A MONTH/socially    Drug use: Never    Sexual activity: Defer       Family History   Problem Relation Age of Onset    Diabetes Mother         complications from DM    Coronary artery  "disease Father     Stroke Father     Diabetes Maternal Grandmother     Depression Sister     Depression Brother        REVIEW OF SYSTEMS:   12 point ROS was performed and is negative except as outlined in HPI        Objective:      Body mass index is 28.67 kg/m².  Flowsheet Rows      Flowsheet Row First Filed Value   Admission Height 162.6 cm (64\") Documented at 06/02/2023 1126   Admission Weight 75.8 kg (167 lb) Documented at 06/02/2023 1126          Vitals:    06/02/23 1440   BP: 123/65   Pulse: 84   Resp: 20   Temp:    SpO2: 94%       Objective   Vital Signs     No intake or output data in the 24 hours ending 06/06/23 1232  Flowsheet Rows      Flowsheet Row First Filed Value   Admission Height 162.6 cm (64\") Documented at 06/02/2023 1126   Admission Weight 75.8 kg (167 lb) Documented at 06/02/2023 1126               No intake or output data in the 24 hours ending 06/06/23 1232  Flowsheet Rows      Flowsheet Row First Filed Value   Admission Height 162.6 cm (64\") Documented at 06/02/2023 1126   Admission Weight 75.8 kg (167 lb) Documented at 06/02/2023 1126            General Appearance:    Alert, cooperative, in no acute distress   Head:    Normocephalic, without obvious abnormality, atraumatic       Neck/Lymph   No adenopathy, supple, no thyromegaly, no carotid bruit, no    JVD   Lungs:     Clear to auscultation bilaterally, no wheezes, rales, or     rhonchi    Cardiac:    Normal rate, regular rhythm, 3 out of 6 systolic murmur peak late.  No rub, no gallop   Chest Wall:    No abnormalities observed   GI:     Normal bowel sounds, soft, nontender, nondistended,            no rebound tenderness   Extremities:   No cyanosis, clubbing, or edema   Circulatory/Peripheral Vascular :   Pulses palpable and equal bilaterally   Integumentary:   No bleeding or rash. Normal temperature         Results Review:    Results from last 7 days   Lab Units 05/31/23  1032   SODIUM mmol/L 141   POTASSIUM mmol/L 4.1   CHLORIDE mmol/L " 98   CO2 mmol/L 23   BUN mg/dL 14   CREATININE mg/dL 0.85   GLUCOSE mg/dL 103*   CALCIUM mg/dL 10.1         Results from last 7 days   Lab Units 05/31/23  1032   WBC 10*3/mm3 11.05*   HEMOGLOBIN g/dL 14.3   HEMATOCRIT % 43.8   PLATELETS 10*3/mm3 503*                 Results from last 7 days   Lab Units 05/31/23  1032   TRIGLYCERIDES mg/dL 134   HDL CHOL mg/dL 76   LDL CHOL mg/dL 106*     @LABRCNT(bnp)@  I reviewed the patient's new clinical results.  I personally viewed and interpreted the patient's EKG/Telemetry data          @LABRCNTIP(chol,trig,hdl,ldl)    I personally viewed and interpreted the patient's EKG/Telemetry data  )  Patient Active Problem List   Diagnosis    Essential hypertension    Anxiety with depression    Hyperlipidemia    Aortic stenosis, severe    Cardiac murmur    Macular degeneration    Colon polyp    Precancerous skin lesion    Hiatal hernia    Chronic pain of left knee    Hand pain    Tear of meniscus of knee    Spondylolisthesis    Spasm of back muscles    Preoperative state    Osteoarthritis of right hand    Osteoarthritis of left knee    Gastroesophageal reflux disease    Stress incontinence, female    Dysplasia of vulva    Disorder of acromioclavicular joint    Chronic neck pain    Cervical radiculopathy    Anxiety    Acquired trigger finger    Vaginal prolapse    OAB (overactive bladder)    Obstructive sleep apnea    Myeloproliferative disorder    Iron deficiency anemia secondary to inadequate dietary iron intake    Essential thrombocythemia     Assessment and Plan:  78-year-old female with a finding of severe degenerative aortic valve stenosis and progressive dyspnea on exertion who presents today for catheterization.    Left right heart catheterization today.  Structural heart team evaluation.    Tanner Hester MD  06/06/23  12:32 EDT.

## 2023-06-16 ENCOUNTER — PREP FOR SURGERY (OUTPATIENT)
Dept: CARDIAC SURGERY | Facility: CLINIC | Age: 78
End: 2023-06-16
Payer: MEDICARE

## 2023-06-16 ENCOUNTER — HOSPITAL ENCOUNTER (OUTPATIENT)
Dept: GENERAL RADIOLOGY | Facility: HOSPITAL | Age: 78
Discharge: HOME | End: 2023-06-16
Payer: MEDICARE

## 2023-06-16 ENCOUNTER — LAB (OUTPATIENT)
Dept: LAB | Facility: HOSPITAL | Age: 78
End: 2023-06-16
Payer: MEDICARE

## 2023-06-16 ENCOUNTER — OFFICE VISIT (OUTPATIENT)
Dept: CARDIAC SURGERY | Facility: CLINIC | Age: 78
End: 2023-06-16
Payer: MEDICARE

## 2023-06-16 ENCOUNTER — HOSPITAL ENCOUNTER (OUTPATIENT)
Dept: CT IMAGING | Facility: HOSPITAL | Age: 78
Discharge: HOME | End: 2023-06-16
Payer: MEDICARE

## 2023-06-16 VITALS
HEART RATE: 101 BPM | RESPIRATION RATE: 18 BRPM | BODY MASS INDEX: 28.68 KG/M2 | DIASTOLIC BLOOD PRESSURE: 86 MMHG | HEIGHT: 64 IN | SYSTOLIC BLOOD PRESSURE: 146 MMHG | WEIGHT: 168 LBS | OXYGEN SATURATION: 97 % | TEMPERATURE: 97.7 F

## 2023-06-16 VITALS — HEART RATE: 78 BPM

## 2023-06-16 DIAGNOSIS — I35.0 AORTIC STENOSIS, SEVERE: ICD-10-CM

## 2023-06-16 DIAGNOSIS — I35.0 AORTIC VALVE STENOSIS, ETIOLOGY OF CARDIAC VALVE DISEASE UNSPECIFIED: ICD-10-CM

## 2023-06-16 DIAGNOSIS — R79.1 ABNORMAL COAGULATION PROFILE: ICD-10-CM

## 2023-06-16 DIAGNOSIS — I35.0 SEVERE AORTIC VALVE STENOSIS: ICD-10-CM

## 2023-06-16 DIAGNOSIS — I35.0 AORTIC STENOSIS, SEVERE: Primary | ICD-10-CM

## 2023-06-16 DIAGNOSIS — I50.32 CHRONIC DIASTOLIC (CONGESTIVE) HEART FAILURE: ICD-10-CM

## 2023-06-16 DIAGNOSIS — Z01.818 PREOPERATIVE TESTING: Primary | ICD-10-CM

## 2023-06-16 DIAGNOSIS — I35.0 AORTIC VALVE STENOSIS, ETIOLOGY OF CARDIAC VALVE DISEASE UNSPECIFIED: Primary | ICD-10-CM

## 2023-06-16 DIAGNOSIS — I10 ESSENTIAL (PRIMARY) HYPERTENSION: ICD-10-CM

## 2023-06-16 DIAGNOSIS — I35.0 SEVERE AORTIC VALVE STENOSIS: Primary | ICD-10-CM

## 2023-06-16 LAB
ALBUMIN SERPL-MCNC: 4.3 G/DL (ref 3.5–5.2)
ALBUMIN/GLOB SERPL: 1.7 G/DL
ALP SERPL-CCNC: 128 U/L (ref 39–117)
ALT SERPL W P-5'-P-CCNC: 16 U/L (ref 1–33)
ANION GAP SERPL CALCULATED.3IONS-SCNC: 11.9 MMOL/L (ref 5–15)
APTT PPP: 30.9 SECONDS (ref 22.7–35.4)
AST SERPL-CCNC: 16 U/L (ref 1–32)
BACTERIA UR QL AUTO: NORMAL /HPF
BASOPHILS # BLD AUTO: 0.07 10*3/MM3 (ref 0–0.2)
BASOPHILS NFR BLD AUTO: 0.6 % (ref 0–1.5)
BILIRUB SERPL-MCNC: 0.5 MG/DL (ref 0–1.2)
BILIRUB UR QL STRIP: NEGATIVE
BUN SERPL-MCNC: 13 MG/DL (ref 8–23)
BUN/CREAT SERPL: 16.7 (ref 7–25)
CALCIUM SPEC-SCNC: 9.9 MG/DL (ref 8.6–10.5)
CHLORIDE SERPL-SCNC: 100 MMOL/L (ref 98–107)
CHOLEST SERPL-MCNC: 205 MG/DL (ref 0–200)
CLARITY UR: CLEAR
CLOSE TME COLL+ADP + EPINEP PNL BLD: 94 % (ref 86–100)
CO2 SERPL-SCNC: 28.1 MMOL/L (ref 22–29)
COLOR UR: YELLOW
CREAT SERPL-MCNC: 0.58 MG/DL (ref 0.57–1)
CREAT SERPL-MCNC: 0.78 MG/DL (ref 0.57–1)
DEPRECATED RDW RBC AUTO: 50.5 FL (ref 37–54)
EGFRCR SERPLBLD CKD-EPI 2021: 77.4 ML/MIN/1.73
EGFRCR SERPLBLD CKD-EPI 2021: 92.2 ML/MIN/1.73
EOSINOPHIL # BLD AUTO: 0.19 10*3/MM3 (ref 0–0.4)
EOSINOPHIL NFR BLD AUTO: 1.7 % (ref 0.3–6.2)
ERYTHROCYTE [DISTWIDTH] IN BLOOD BY AUTOMATED COUNT: 15.5 % (ref 12.3–15.4)
GLOBULIN UR ELPH-MCNC: 2.6 GM/DL
GLUCOSE SERPL-MCNC: 113 MG/DL (ref 65–99)
GLUCOSE UR STRIP-MCNC: NEGATIVE MG/DL
HCT VFR BLD AUTO: 44.6 % (ref 34–46.6)
HDLC SERPL-MCNC: 76 MG/DL (ref 40–60)
HGB BLD-MCNC: 14.9 G/DL (ref 12–15.9)
HGB UR QL STRIP.AUTO: ABNORMAL
HYALINE CASTS UR QL AUTO: NORMAL /LPF
INR PPP: 1.03 (ref 0.9–1.1)
KETONES UR QL STRIP: NEGATIVE
LDLC SERPL CALC-MCNC: 110 MG/DL (ref 0–100)
LDLC/HDLC SERPL: 1.41 {RATIO}
LEUKOCYTE ESTERASE UR QL STRIP.AUTO: NEGATIVE
LYMPHOCYTES # BLD AUTO: 2.36 10*3/MM3 (ref 0.7–3.1)
LYMPHOCYTES NFR BLD AUTO: 20.8 % (ref 19.6–45.3)
MCH RBC QN AUTO: 29.9 PG (ref 26.6–33)
MCHC RBC AUTO-ENTMCNC: 33.4 G/DL (ref 31.5–35.7)
MCV RBC AUTO: 89.4 FL (ref 79–97)
MONOCYTES # BLD AUTO: 0.73 10*3/MM3 (ref 0.1–0.9)
MONOCYTES NFR BLD AUTO: 6.4 % (ref 5–12)
NEUTROPHILS NFR BLD AUTO: 7.97 10*3/MM3 (ref 1.7–7)
NEUTROPHILS NFR BLD AUTO: 70.1 % (ref 42.7–76)
NITRITE UR QL STRIP: NEGATIVE
NT-PROBNP SERPL-MCNC: 391 PG/ML (ref 0–1800)
PH UR STRIP.AUTO: 6.5 [PH] (ref 5–8)
PLATELET # BLD AUTO: 446 10*3/MM3 (ref 140–450)
PMV BLD AUTO: 11.7 FL (ref 6–12)
POTASSIUM SERPL-SCNC: 3.2 MMOL/L (ref 3.5–5.2)
PROT SERPL-MCNC: 6.9 G/DL (ref 6–8.5)
PROT UR QL STRIP: NEGATIVE
PROTHROMBIN TIME: 13.6 SECONDS (ref 11.7–14.2)
RBC # BLD AUTO: 4.99 10*6/MM3 (ref 3.77–5.28)
RBC # UR STRIP: NORMAL /HPF
REF LAB TEST METHOD: NORMAL
SODIUM SERPL-SCNC: 140 MMOL/L (ref 136–145)
SP GR UR STRIP: >=1.03 (ref 1–1.03)
SQUAMOUS #/AREA URNS HPF: NORMAL /HPF
TRIGL SERPL-MCNC: 110 MG/DL (ref 0–150)
UROBILINOGEN UR QL STRIP: ABNORMAL
VLDLC SERPL-MCNC: 19 MG/DL (ref 5–40)
WBC # UR STRIP: NORMAL /HPF
WBC NRBC COR # BLD: 11.36 10*3/MM3 (ref 3.4–10.8)

## 2023-06-16 PROCEDURE — 85730 THROMBOPLASTIN TIME PARTIAL: CPT

## 2023-06-16 PROCEDURE — 71275 CT ANGIOGRAPHY CHEST: CPT

## 2023-06-16 PROCEDURE — 74174 CTA ABD&PLVS W/CONTRAST: CPT

## 2023-06-16 PROCEDURE — 82565 ASSAY OF CREATININE: CPT | Performed by: INTERNAL MEDICINE

## 2023-06-16 PROCEDURE — 80061 LIPID PANEL: CPT | Performed by: NURSE PRACTITIONER

## 2023-06-16 PROCEDURE — 80053 COMPREHEN METABOLIC PANEL: CPT | Performed by: NURSE PRACTITIONER

## 2023-06-16 PROCEDURE — 83880 ASSAY OF NATRIURETIC PEPTIDE: CPT

## 2023-06-16 PROCEDURE — 71046 X-RAY EXAM CHEST 2 VIEWS: CPT

## 2023-06-16 PROCEDURE — 25510000001 IOPAMIDOL PER 1 ML: Performed by: INTERNAL MEDICINE

## 2023-06-16 PROCEDURE — 81001 URINALYSIS AUTO W/SCOPE: CPT

## 2023-06-16 PROCEDURE — 85025 COMPLETE CBC W/AUTO DIFF WBC: CPT

## 2023-06-16 PROCEDURE — 85576 BLOOD PLATELET AGGREGATION: CPT

## 2023-06-16 PROCEDURE — 36415 COLL VENOUS BLD VENIPUNCTURE: CPT | Performed by: INTERNAL MEDICINE

## 2023-06-16 PROCEDURE — 85610 PROTHROMBIN TIME: CPT

## 2023-06-16 RX ORDER — CHLORHEXIDINE GLUCONATE 0.12 MG/ML
15 RINSE ORAL ONCE
OUTPATIENT
Start: 2023-06-16 | End: 2023-06-16

## 2023-06-16 RX ORDER — CHLORHEXIDINE GLUCONATE 0.12 MG/ML
15 RINSE ORAL 2 TIMES DAILY
Qty: 30 ML | Refills: 0 | Status: SHIPPED | OUTPATIENT
Start: 2023-06-16 | End: 2023-06-17

## 2023-06-16 RX ADMIN — IOPAMIDOL 95 ML: 755 INJECTION, SOLUTION INTRAVENOUS at 10:43

## 2023-06-16 NOTE — PROGRESS NOTES
"Chief Complaint  No chief complaint on file.    Subjective        Pooja Chau presents to [facility] CARDIAC SURGERY  History of Present Illness  78 years old female with hypertension and aortic stenosis.  She has shortness of breath with exertion since the last few months.  She denies chest pain or syncope.    Echocardiogram on 5/20/23 showed:  •  Left ventricular systolic function is normal. Calculated left ventricular EF = 61.4%  •  Left ventricular wall thickness is consistent with mild concentric hypertrophy.  •  Left ventricular diastolic function was indeterminate.  •  Moderate to severe aortic valve stenosis is present by gradient although due to technical limitations of the study this could underestimate severity  •  Moderate fibrocalcific disease aortic valve possibly bicuspid in nature     Cardic cath on 6/20/23 showed:  1. Left main: Normal  2. LAD: Normal  3. LCX: Normal  4. RCA: Luminal irregularities midsegment.          Objective   Vital Signs:  /86 (BP Location: Left arm, Patient Position: Sitting, Cuff Size: Adult)   Pulse 101   Temp 97.7 °F (36.5 °C)   Resp 18   Ht 162.6 cm (64\")   Wt 76.2 kg (168 lb)   SpO2 97%   BMI 28.84 kg/m²   Estimated body mass index is 28.84 kg/m² as calculated from the following:    Height as of this encounter: 162.6 cm (64\").    Weight as of this encounter: 76.2 kg (168 lb).             Physical Exam  Vitals and nursing note reviewed.   Constitutional:       Appearance: Normal appearance. She is normal weight.   Cardiovascular:      Rate and Rhythm: Normal rate and regular rhythm.      Heart sounds: Murmur heard.   Pulmonary:      Effort: Pulmonary effort is normal.      Breath sounds: Normal breath sounds.   Neurological:      General: No focal deficit present.      Mental Status: She is alert and oriented to person, place, and time. Mental status is at baseline.   Psychiatric:         Mood and Affect: Mood normal.         " Behavior: Behavior normal.         Thought Content: Thought content normal.         Judgment: Judgment normal.        Result Review :                   Assessment and Plan   There are no diagnoses linked to this encounter.    -Severe symptomatic aortic valve stenosis.  For TAVR.    78 years old female with severe degenerative aortic valve stenosis symptomatic for shortness of breath.  I think that she needs an intervention to her aortic valve, to improve symptoms and prolong life.  She had multiple interventions in her knees and back and because of that she is moderate risk for SAVR so I will recommend a TAVR procedure to address her aortic valve.  I reviewed a TAVR CTA and I think that she is a good candidate for TAVR.  I explained risk and benefits of the procedure to the patient and she agreed to proceed.  In case needed she will be an open rescue.           Follow Up   No follow-ups on file.  Patient was given instructions and counseling regarding her condition or for health maintenance advice. Please see specific information pulled into the AVS if appropriate.

## 2023-06-20 ENCOUNTER — ANESTHESIA EVENT (OUTPATIENT)
Dept: PERIOP | Facility: HOSPITAL | Age: 78
DRG: 267 | End: 2023-06-20
Payer: MEDICARE

## 2023-06-21 ENCOUNTER — ANESTHESIA (OUTPATIENT)
Dept: PERIOP | Facility: HOSPITAL | Age: 78
DRG: 267 | End: 2023-06-21
Payer: MEDICARE

## 2023-06-21 PROCEDURE — C1751 CATH, INF, PER/CENT/MIDLINE: HCPCS | Performed by: ANESTHESIOLOGY

## 2023-06-21 PROCEDURE — 63710000001 INSULIN REGULAR HUMAN PER 5 UNITS: Performed by: ANESTHESIOLOGY

## 2023-06-21 PROCEDURE — 25010000002 MIDAZOLAM PER 1 MG: Performed by: ANESTHESIOLOGY

## 2023-06-21 PROCEDURE — 25010000002 PROPOFOL 10 MG/ML EMULSION: Performed by: ANESTHESIOLOGY

## 2023-06-21 PROCEDURE — 25010000002 CEFAZOLIN IN DEXTROSE 2-4 GM/100ML-% SOLUTION: Performed by: NURSE PRACTITIONER

## 2023-06-21 PROCEDURE — 25010000002 HEPARIN (PORCINE) PER 1000 UNITS: Performed by: ANESTHESIOLOGY

## 2023-06-21 PROCEDURE — 25010000002 PROTAMINE SULFATE PER 10 MG: Performed by: ANESTHESIOLOGY

## 2023-06-21 RX ORDER — PROPOFOL 10 MG/ML
VIAL (ML) INTRAVENOUS AS NEEDED
Status: DISCONTINUED | OUTPATIENT
Start: 2023-06-21 | End: 2023-06-21 | Stop reason: SURG

## 2023-06-21 RX ORDER — LIDOCAINE HYDROCHLORIDE 20 MG/ML
INJECTION, SOLUTION EPIDURAL; INFILTRATION; INTRACAUDAL; PERINEURAL AS NEEDED
Status: DISCONTINUED | OUTPATIENT
Start: 2023-06-21 | End: 2023-06-21 | Stop reason: SURG

## 2023-06-21 RX ORDER — SODIUM CHLORIDE 9 MG/ML
INJECTION, SOLUTION INTRAVENOUS CONTINUOUS PRN
Status: DISCONTINUED | OUTPATIENT
Start: 2023-06-21 | End: 2023-06-21 | Stop reason: SURG

## 2023-06-21 RX ORDER — HEPARIN SODIUM 1000 [USP'U]/ML
INJECTION, SOLUTION INTRAVENOUS; SUBCUTANEOUS AS NEEDED
Status: DISCONTINUED | OUTPATIENT
Start: 2023-06-21 | End: 2023-06-21 | Stop reason: SURG

## 2023-06-21 RX ORDER — PROTAMINE SULFATE 10 MG/ML
INJECTION, SOLUTION INTRAVENOUS AS NEEDED
Status: DISCONTINUED | OUTPATIENT
Start: 2023-06-21 | End: 2023-06-21 | Stop reason: SURG

## 2023-06-21 RX ORDER — KETAMINE HCL IN NACL, ISO-OSM 100MG/10ML
SYRINGE (ML) INJECTION AS NEEDED
Status: DISCONTINUED | OUTPATIENT
Start: 2023-06-21 | End: 2023-06-21 | Stop reason: SURG

## 2023-06-21 RX ORDER — DEXMEDETOMIDINE HYDROCHLORIDE 4 UG/ML
INJECTION, SOLUTION INTRAVENOUS CONTINUOUS PRN
Status: DISCONTINUED | OUTPATIENT
Start: 2023-06-21 | End: 2023-06-21 | Stop reason: SURG

## 2023-06-21 RX ORDER — PROPOFOL 10 MG/ML
VIAL (ML) INTRAVENOUS CONTINUOUS PRN
Status: DISCONTINUED | OUTPATIENT
Start: 2023-06-21 | End: 2023-06-21 | Stop reason: SURG

## 2023-06-21 RX ORDER — MIDAZOLAM HYDROCHLORIDE 1 MG/ML
INJECTION INTRAMUSCULAR; INTRAVENOUS AS NEEDED
Status: DISCONTINUED | OUTPATIENT
Start: 2023-06-21 | End: 2023-06-21 | Stop reason: SURG

## 2023-06-21 RX ORDER — DEXMEDETOMIDINE HYDROCHLORIDE 100 UG/ML
INJECTION, SOLUTION INTRAVENOUS AS NEEDED
Status: DISCONTINUED | OUTPATIENT
Start: 2023-06-21 | End: 2023-06-21 | Stop reason: SURG

## 2023-06-21 RX ADMIN — LIDOCAINE HYDROCHLORIDE 50 MG: 20 INJECTION, SOLUTION EPIDURAL; INFILTRATION; INTRACAUDAL; PERINEURAL at 07:00

## 2023-06-21 RX ADMIN — DEXMEDETOMIDINE HYDROCHLORIDE 1 MCG/KG/HR: 4 INJECTION, SOLUTION INTRAVENOUS at 07:11

## 2023-06-21 RX ADMIN — MIDAZOLAM 1 MG: 1 INJECTION INTRAMUSCULAR; INTRAVENOUS at 07:00

## 2023-06-21 RX ADMIN — Medication 10 MG: at 07:46

## 2023-06-21 RX ADMIN — INSULIN HUMAN 4 UNITS: 100 INJECTION, SOLUTION PARENTERAL at 07:55

## 2023-06-21 RX ADMIN — PROPOFOL 20 MG: 10 INJECTION, EMULSION INTRAVENOUS at 07:21

## 2023-06-21 RX ADMIN — CEFAZOLIN SODIUM 2 G: 2 INJECTION, SOLUTION INTRAVENOUS at 07:30

## 2023-06-21 RX ADMIN — PROTAMINE SULFATE 100 MG: 10 INJECTION, SOLUTION INTRAVENOUS at 08:32

## 2023-06-21 RX ADMIN — NOREPINEPHRINE BITARTRATE 0.02 MCG/KG/MIN: 1 INJECTION, SOLUTION, CONCENTRATE INTRAVENOUS at 07:00

## 2023-06-21 RX ADMIN — Medication 10 MG: at 07:00

## 2023-06-21 RX ADMIN — HEPARIN SODIUM 12000 UNITS: 1000 INJECTION, SOLUTION INTRAVENOUS; SUBCUTANEOUS at 08:08

## 2023-06-21 RX ADMIN — Medication 10 MG: at 07:20

## 2023-06-21 RX ADMIN — Medication 10 MCG/KG/MIN: at 07:00

## 2023-06-21 RX ADMIN — SODIUM CHLORIDE: 9 INJECTION, SOLUTION INTRAVENOUS at 06:51

## 2023-06-21 RX ADMIN — DEXMEDETOMIDINE 38 MCG: 100 INJECTION, SOLUTION, CONCENTRATE INTRAVENOUS at 07:00

## 2023-06-21 RX ADMIN — PROPOFOL 20 MG: 10 INJECTION, EMULSION INTRAVENOUS at 07:11

## 2023-06-21 NOTE — ANESTHESIA PREPROCEDURE EVALUATION
Anesthesia Evaluation     Patient summary reviewed and Nursing notes reviewed   NPO Solid Status: > 8 hours  NPO Liquid Status: > 2 hours           Airway   Mallampati: II  TM distance: >3 FB  Neck ROM: full  Dental          Pulmonary    (+) ,sleep apnea  Cardiovascular   Exercise tolerance: poor (<4 METS)    ECG reviewed  Rhythm: regular  Rate: normal    (+) hypertension 2 medications or greater, valvular problems/murmurs ASCHF Diastolic >=55%, murmur, hyperlipidemia    ROS comment: Left ventricular systolic function is normal. Calculated left ventricular EF = 61.4%    Left ventricular wall thickness is consistent with mild concentric hypertrophy.    Left ventricular diastolic function was indeterminate.    Moderate to severe aortic valve stenosis is present by gradient although due to technical limitations of the study this could underestimate severity    Moderate fibrocalcific disease aortic valve possibly bicuspid in nature         Neuro/Psych  (+) numbness, psychiatric history  GI/Hepatic/Renal/Endo    (+) hiatal hernia, GERD    Musculoskeletal     (+) back pain, neck pain  Abdominal    Substance History      OB/GYN          Other   arthritis, blood dyscrasia,                     Anesthesia Plan    ASA 4     MAC and [place]     intravenous induction     Anesthetic plan, risks, benefits, and alternatives have been provided, discussed and informed consent has been obtained with: patient.      CODE STATUS:    Level Of Support Discussed With: Patient  Code Status (Patient has no pulse and is not breathing): CPR (Attempt to Resuscitate)  Medical Interventions (Patient has pulse or is breathing): Full Support

## 2023-06-21 NOTE — ANESTHESIA PROCEDURE NOTES
Central Line      Patient reassessed immediately prior to procedure    Patient location during procedure: OR  Indications: central pressure monitoring and vascular access  Staff  Anesthesiologist: Mario Pacheco MD  Preanesthetic Checklist  Completed: patient identified, IV checked, site marked, risks and benefits discussed, surgical consent, monitors and equipment checked, pre-op evaluation and timeout performed  Central Line Prep  Sterile Tech:gloves, cap, gown, mask and sterile barriers  Prep: chloraprep  no medical exclusion documented for following all elements of maximal sterile barrier technique  Patient monitoring: blood pressure monitoring, continuous pulse oximetry and EKG  Central Line Procedure  Laterality:right  Location:internal jugular  Catheter Type:single lumen  Catheter Size:7 Fr  Guidance:ultrasound guided  PROCEDURE NOTE/ULTRASOUND INTERPRETATION.  Using ultrasound guidance the potential vascular sites for insertion of the catheter were visualized to determine the patency of the vessel to be used for vascular access.  After selecting the appropriate site for insertion, the needle was visualized under ultrasound being inserted into the internal jugular vein, followed by ultrasound confirmation of wire and catheter placement. There were no abnormalities seen on ultrasound; an image was taken; and the patient tolerated the procedure with no complications. Images: still images obtained, printed/placed on chart  Assessment  Post procedure:biopatch applied, line sutured and occlusive dressing applied  Assessement:blood return through all ports and free fluid flow  Complications:no  Patient Tolerance:patient tolerated the procedure well with no apparent complications

## 2023-06-22 PROBLEM — I44.2 COMPLETE HEART BLOCK: Status: ACTIVE | Noted: 2023-06-16

## 2023-06-27 ENCOUNTER — TELEPHONE (OUTPATIENT)
Dept: CARDIOLOGY | Facility: CLINIC | Age: 78
End: 2023-06-27

## 2023-06-27 NOTE — TELEPHONE ENCOUNTER
All of her BP/ HR look good except the one reading.  Have her continue to monitor BP and we will look over them when I see her.  No changes at this time.

## 2023-06-27 NOTE — TELEPHONE ENCOUNTER
Caller: Pooja Chau    Relationship: Self    Best call back number: [phone]    What is the best time to reach you: ANYTIME    Who are you requesting to speak with (clinical staff, provider,  specific staff member): CLINICAL    What was the call regarding: PT IS CALLING TO TALK TO SOMEONE ABOUT HER PACEMAKER AND HER HAVING HIGH BLOOD PRESSURE FOR . PT SAID HER BLOOD PRESSURE WAS HIGH AFTER SHE TOOK HER MEDS THIS MORNIN/84, 167/73, 175/78, 155/71. PT SAID SHE HASN'T HAD ANY OTHER SYMPTOMS WITH THE HIGH BLOOD PRESSURE AND FEELS GREAT. PT SAID AN INCIDENT HAPPENED YESTERDAY AND HER ANXIETY WENT UP AND SHE HAD TO TAKE HER ANXIETY MEDICINE, SHE WASN'T SURE IF THAT HAD SOMETHING TO DO WITH HER BP BEING ELEVATED.    Is it okay if the provider responds through MyChart: NO

## 2023-06-27 NOTE — TELEPHONE ENCOUNTER
Called and left VM. Will continue to try to reach patient.     Tanvi Whittington RN  Triage [facility] [place]
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
## 2023-06-28 NOTE — TELEPHONE ENCOUNTER
Notified patient of recommendations. Patient verbalized understanding.    Tanvi Whittington RN  [place] [facility] [place] [facility] [place]

## 2023-06-29 PROBLEM — Z95.0 PRESENCE OF CARDIAC PACEMAKER: Status: ACTIVE | Noted: 2023-06-29

## 2023-06-29 PROBLEM — Z95.2 S/P TAVR (TRANSCATHETER AORTIC VALVE REPLACEMENT): Status: ACTIVE | Noted: 2023-06-29

## 2023-06-30 PROBLEM — IMO0002 PREOPERATIVE STATE: Status: RESOLVED | Noted: 2018-08-22 | Resolved: 2023-06-30

## 2023-07-20 ENCOUNTER — TELEPHONE (OUTPATIENT)
Dept: FAMILY MEDICINE CLINIC | Age: 78
End: 2023-07-20

## 2023-07-20 NOTE — TELEPHONE ENCOUNTER
Pt has nodules on her vulva   and she had to have a bx  had them in 2012 in [place]   phone number for records .   DR Isaacs  [phone]

## 2023-07-20 NOTE — TELEPHONE ENCOUNTER
Caller: Pooja Chau    Relationship: Self    Best call back number: [phone]    What is the best time to reach you: ANYTIME    Who are you requesting to speak with (clinical staff, provider,  specific staff member): NURSE     What was the call regarding: PATIENT IS NEEDING A REFERRAL TO A GYNECOLOGIST THAT TATIANA SAMS WOULD RECOMMEND

## 2023-07-24 DIAGNOSIS — N90.3 DYSPLASIA OF VULVA: Primary | ICD-10-CM

## 2023-07-24 DIAGNOSIS — F33.2 RECURRENT SEASONAL MAJOR DEPRESSION, SEVERE: ICD-10-CM

## 2023-07-28 RX ORDER — FLUOXETINE HYDROCHLORIDE 20 MG/1
CAPSULE ORAL
Qty: 90 CAPSULE | Refills: 0 | Status: SHIPPED | OUTPATIENT
Start: 2023-07-28

## 2023-08-07 NOTE — ANESTHESIA POSTPROCEDURE EVALUATION
Patient: Pooja Chau    Procedure Summary       Date: 06/21/23 Room / Location: [address] CARDIOVASCULAR OPERATING ROOM    Anesthesia Start: 0651 Anesthesia Stop: 0908    Procedures:       TRANSFEMORAL TRANSCATHETER AORTIC VALVE REPLACEMENT (Chest)      Transfemoral Transcatheter Aortic Valve Replacement Diagnosis:       Chronic diastolic (congestive) heart failure      Symptomatic severe aortic stenosis with normal ejection fraction      Severe aortic valve stenosis      Essential thrombocythemia      Iron deficiency anemia secondary to inadequate dietary iron intake      Myeloproliferative disorder      Cardiac murmur      Aortic stenosis, severe      (Chronic diastolic (congestive) heart failure [I50.32])      (Symptomatic severe aortic stenosis with normal ejection fraction [I35.0])      (Severe aortic valve stenosis [I35.0])      (Essential thrombocythemia [D47.3])      (Iron deficiency anemia secondary to inadequate dietary iron intake [D50.8])      (Myeloproliferative disorder [D47.1])      (Cardiac murmur [R01.1])      (Aortic stenosis, severe [I35.0])    Surgeons: Dalton Neumann MD; Tanner Hester MD Provider: Mario Pacheco MD    Anesthesia Type: MAC, Lily ASA Status: 4            Anesthesia Type: MAC, Lily    Vitals  Vitals Value Taken Time   /77 06/30/23 1107   Temp 36.8 øC (98.3 øF) 06/30/23 1107   Pulse 79 06/30/23 1107   Resp 18 06/23/23 0404   SpO2 97 % 06/30/23 1107           Post Anesthesia Care and Evaluation    Patient location during evaluation: bedside  Patient participation: complete - patient participated  Level of consciousness: awake  Pain management: adequate    Airway patency: patent  Anesthetic complications: No anesthetic complications  PONV Status: none  Cardiovascular status: acceptable  Respiratory status: acceptable  Hydration status: acceptable  Post Neuraxial Block status: Motor and sensory function returned to  baseline

## 2023-08-11 ENCOUNTER — OFFICE VISIT (OUTPATIENT)
Dept: CARDIAC REHAB | Facility: HOSPITAL | Age: 78
End: 2023-08-11
Payer: MEDICARE

## 2023-08-11 DIAGNOSIS — Z95.2 S/P TAVR (TRANSCATHETER AORTIC VALVE REPLACEMENT): Primary | ICD-10-CM

## 2023-08-11 NOTE — PROGRESS NOTES
Pt declines Cardiac Rehab at this time.  She states she has been experiencing leg pain and the feeling of her legs being warm to touch for several months and has been seen by numerous physicians for this.  She states leg pain worsens with activity. She would like to follow up with her family MD again regarding her leg pain before starting Cardiac Rehab.

## 2023-08-17 ENCOUNTER — OFFICE VISIT (OUTPATIENT)
Dept: CARDIOLOGY | Facility: CLINIC | Age: 78
End: 2023-08-17
Payer: MEDICARE

## 2023-08-17 ENCOUNTER — HOSPITAL ENCOUNTER (OUTPATIENT)
Dept: CARDIOLOGY | Facility: HOSPITAL | Age: 78
Discharge: HOME | End: 2023-08-17
Payer: MEDICARE

## 2023-08-17 VITALS
HEIGHT: 65 IN | OXYGEN SATURATION: 96 % | BODY MASS INDEX: 27.82 KG/M2 | SYSTOLIC BLOOD PRESSURE: 160 MMHG | DIASTOLIC BLOOD PRESSURE: 90 MMHG | HEART RATE: 77 BPM | WEIGHT: 167 LBS

## 2023-08-17 VITALS
HEART RATE: 84 BPM | BODY MASS INDEX: 28.51 KG/M2 | WEIGHT: 167 LBS | DIASTOLIC BLOOD PRESSURE: 90 MMHG | HEIGHT: 64 IN | SYSTOLIC BLOOD PRESSURE: 160 MMHG

## 2023-08-17 DIAGNOSIS — Z95.2 S/P TAVR (TRANSCATHETER AORTIC VALVE REPLACEMENT): ICD-10-CM

## 2023-08-17 DIAGNOSIS — I10 ESSENTIAL HYPERTENSION: ICD-10-CM

## 2023-08-17 DIAGNOSIS — I44.2 COMPLETE HEART BLOCK: ICD-10-CM

## 2023-08-17 DIAGNOSIS — Z95.2 S/P TAVR (TRANSCATHETER AORTIC VALVE REPLACEMENT): Primary | ICD-10-CM

## 2023-08-17 PROCEDURE — 25510000001 PERFLUTREN (DEFINITY) 8.476 MG IN SODIUM CHLORIDE (PF) 0.9 % 10 ML INJECTION

## 2023-08-17 PROCEDURE — 93306 TTE W/DOPPLER COMPLETE: CPT

## 2023-08-17 RX ADMIN — PERFLUTREN 1.5 ML: 6.52 INJECTION, SUSPENSION INTRAVENOUS at 11:09

## 2023-08-17 NOTE — PROGRESS NOTES
Date of Office Visit: 2023  Encounter Provider: Tanner Hester MD  Place of Service: [facility]  Patient Name: Pooja Chau  :1945    Chief Complaint   Patient presents with    POST TAVR     30 DAY.          HPI: Pooja Chau is a 78 y.o. with a medical history of severe degenerative aortic valve stenosis that has been asymptomatic.  She also has a history of hypertension, hyperlipidemia, obstructive sleep apnea and recently diagnosed myeloproliferative neoplasm.  When she was last seen in the office she was noticing increasing dyspnea on exertion.  She underwent left heart cath on 2023 and was evaluated by structural heart team for possible TAVR.  Left heart cath showed normal coronaries.    On 2023, she underwent successful transcatheter aortic valve replacement with 26 mm STEVEN Ultra transcatheter aortic heart valve with Saw Hester and Thien.  Postprocedure echocardiogram the next day shows normal LV function, TAVR valve is new to me today well-seated with normal gradients.  Patient had a previous right bundle branch block prior to procedure and subsequently developed complete heart block.  She was seen by Dr. Arechiga and it was felt strongly that her AV conduction will not recover.  She underwent dual-chamber permanent pacemaker placement on 2023 with Medtronic device.    She has been doing well since that time.  She denies any recent issues with chest pain or dyspnea on exertion.  Her blood pressure is elevated today 160/90.    Previous testing and notes have been reviewed by me.     Past Medical History:   Diagnosis Date    Aortic stenosis     Back pain     Bronchitis     Claustrophobia     Heart disease     Hypertension     Pneumonia        Past Surgical History:   Procedure Laterality Date    AORTIC VALVE REPAIR/REPLACEMENT N/A 2023    Procedure: TRANSFEMORAL TRANSCATHETER AORTIC VALVE REPLACEMENT;   Surgeon: Dalton Neumann MD;  Location: [facility];  Service: Cardiothoracic;  Laterality: N/A;    AORTIC VALVE REPAIR/REPLACEMENT N/A 6/21/2023    Procedure: Transfemoral Transcatheter Aortic Valve Replacement;  Surgeon: Tanner Hester MD;  Location: [facility];  Service: Cardiovascular;  Laterality: N/A;    CARDIAC CATHETERIZATION N/A 6/2/2023    Procedure: Left Heart Cath;  Surgeon: Tanner Hester MD;  Location: [facility] CATH INVASIVE LOC[facility];  Service: Cardiology;  Laterality: N/A;  TAVR work up    CARDIAC ELECTROPHYSIOLOGY PROCEDURE N/A 6/22/2023    Procedure: Pacemaker DC new medtronic;  Surgeon: Viet Arechiga MD;  Location: [facility] CATH INVASIVE LOCATION;  Service: Cardiovascular;  Laterality: N/A;    CERVICAL DISCECTOMY ANTERIOR  10/2018    c4-c7 with fusion    HYSTERECTOMY      at age 39   with BLSO    KNEE SURGERY Bilateral     2x/s in right   1 x in left    arthroscopy      SHOULDER SURGERY Left        Social History     Socioeconomic History    Marital status:     Number of children: 2   Tobacco Use    Smoking status: Never    Smokeless tobacco: Never   Vaping Use    Vaping Use: Never used   Substance and Sexual Activity    Alcohol use: Yes     Comment: VERY RARELY/ONCE A MONTH/socially    Drug use: Never    Sexual activity: Defer       Family History   Problem Relation Age of Onset    Diabetes Mother         complications from DM    Coronary artery disease Father     Stroke Father     Diabetes Maternal Grandmother     Depression Sister     Depression Brother        Review of Systems   Constitutional: Negative. Negative for fever and malaise/fatigue.   HENT: Negative.  Negative for nosebleeds and sore throat.    Eyes: Negative.  Negative for blurred vision and double vision.   Cardiovascular: Negative.  Negative for chest pain, claudication, dyspnea on exertion, palpitations and syncope.   Respiratory: Negative.  Negative for cough, shortness of breath and snoring.     Endocrine: Negative.  Negative for cold intolerance, heat intolerance and polydipsia.   Hematologic/Lymphatic: Negative.    Skin: Negative.  Negative for itching, poor wound healing and rash.        Left groin site slightly hardened area   Musculoskeletal: Negative.  Negative for joint pain, joint swelling, muscle weakness and myalgias.   Gastrointestinal: Negative.  Negative for abdominal pain, melena, nausea and vomiting.   Genitourinary: Negative.    Neurological: Negative.  Negative for light-headedness, loss of balance, seizures, vertigo and weakness.   Psychiatric/Behavioral: Negative.  Negative for altered mental status and depression.    Allergic/Immunologic: Negative.      Allergies   Allergen Reactions    Nsaids Swelling    Codeine Nausea Only         Current Outpatient Medications:     acetaminophen (TYLENOL) 325 MG tablet, Take 2 tablets by mouth Every 4 (Four) Hours As Needed for Mild Pain., Disp: , Rfl:     amLODIPine (NORVASC) 5 MG tablet, Take 1 tablet by mouth Daily., Disp: 90 tablet, Rfl: 3    aspirin 81 MG chewable tablet, Chew 1 tablet Daily., Disp: , Rfl:     cyclobenzaprine (FLEXERIL) 10 MG tablet, Take 1 tablet by mouth 3 (Three) Times a Day As Needed for Muscle Spasms., Disp: , Rfl:     hydroCHLOROthiazide (HYDRODIURIL) 25 MG tablet, Take 1 tablet by mouth Daily., Disp: 90 tablet, Rfl: 3    hydroxyurea (HYDREA) 500 MG capsule, TAKE 1 TABLET EVERY OTHER DAY, Disp: , Rfl:     hydrOXYzine (ATARAX) 25 MG tablet, Take 0.5 tablets by mouth Every 8 (Eight) Hours As Needed for Anxiety., Disp: 90 tablet, Rfl: 1    lisinopril (PRINIVIL,ZESTRIL) 40 MG tablet, Take 1 tablet by mouth Daily., Disp: , Rfl:     MAGNESIUM PO, Take 1,000 mg by mouth Daily., Disp: , Rfl:     NON FORMULARY, Daily. Lutine eye vitamin, Disp: , Rfl:     simvastatin (ZOCOR) 40 MG tablet, Take 1 tablet by mouth Every Night., Disp: 90 tablet, Rfl: 3  No current facility-administered medications for this visit.      Objective:  "    Vitals:    08/17/23 1126   BP: 160/90   Pulse: 84   Weight: 75.8 kg (167 lb)   Height: 162.6 cm (64\")     Body mass index is 28.67 kg/mý.           PHYSICAL EXAM:    Constitutional:       Appearance: Healthy appearance. Not in distress.   Neck:      Vascular: No JVR. JVD normal.   Pulmonary:      Effort: Pulmonary effort is normal.      Breath sounds: Normal breath sounds. No wheezing. No rhonchi. No rales.   Chest:      Chest wall: Not tender to palpatation.   Cardiovascular:      PMI at left midclavicular line. Normal rate. Regular rhythm. Normal S1. Normal S2.       Murmurs: There is a early systolic murmur.      No gallop.  No click. No rub.   Pulses:     Intact distal pulses.   Edema:     Peripheral edema absent.   Abdominal:      General: Bowel sounds are normal.      Palpations: Abdomen is soft.      Tenderness: There is no abdominal tenderness.   Musculoskeletal: Normal range of motion.         General: No tenderness. Skin:     General: Skin is warm and dry.   Neurological:      General: No focal deficit present.      Mental Status: Alert and oriented to person, place and time.         ECG 12 Lead    Date/Time: 8/17/2023 11:47 AM  Performed by: Tanner Hester MD  Authorized by: Tanner Hester MD   Rhythm: paced    Clinical impression: abnormal EKG  Comments: Atrial sensed, ventricular paced        2D Echocardiogram 06/22/2023: (post TAVR)    Left ventricular systolic function is normal. Calculated left ventricular EF = 61.6%    Left ventricular wall thickness is consistent with hypertrophy. Sigmoid-shaped ventricular septum is present.    Left ventricular diastolic function is consistent with (grade II w/high LAP) pseudonormalization.    There is a TAVR valve present.    Aortic valve area is 1.3 cm2.    Peak velocity of the flow distal to the aortic valve is 236.5 cm/s. Aortic valve maximum pressure gradient is 22 mmHg. Aortic valve mean pressure gradient is 12 mmHg. Aortic valve " dimensionless index is 0.4 .    Estimated right ventricular systolic pressure from tricuspid regurgitation is normal (<35 mmHg).    Left Heat Cath 06/02/2023:  1. Left main: Normal  2. LAD: Normal  3. LCX: Normal  4. RCA: Luminal irregularities midsegment.    Recommendations: Move forward with CTA and surgical evaluation as part of the structural heart team consult for severe degenerative aortic valve stenosis      Assessment:     Plan:       1.  Severe degenerative aortic stenosis: s/p TAVR 06/21/2023.  Mean gradient 18 mmHg across the prosthetic valve in the setting of preserved to mildly hyperdynamic left ventricular systolic function.  Dimensionless index normal.  - Continue aspirin 81 mg p.o. daily lifelong.    2.  Complete heart block: s/p dual chambered permanent pacemaker (Medtronic) by Dr. Arechiga (06/22/23).  Functioning well.    3.  Hypertension: Not well controlled today.  I have recommended she monitor her blood pressure over the next couple of weeks and give me a call.  We will consider increasing amlodipine therapy if not at goal.    4.  Hyperlipidemia: Continue simvastatin therapy at current dose.  Reasonable control       Your medication list            Accurate as of August 17, 2023 11:44 AM. If you have any questions, ask your nurse or doctor.                CONTINUE taking these medications        Instructions Last Dose Given Next Dose Due   acetaminophen 325 MG tablet  Commonly known as: TYLENOL      Take 2 tablets by mouth Every 4 (Four) Hours As Needed for Mild Pain.       amLODIPine 5 MG tablet  Commonly known as: NORVASC      Take 1 tablet by mouth Daily.       aspirin 81 MG chewable tablet      Chew 1 tablet Daily.       cyclobenzaprine 10 MG tablet  Commonly known as: FLEXERIL      Take 1 tablet by mouth 3 (Three) Times a Day As Needed for Muscle Spasms.       hydroCHLOROthiazide 25 MG tablet  Commonly known as: HYDRODIURIL      Take 1 tablet by mouth Daily.       hydroxyurea 500 MG  capsule  Commonly known as: HYDREA      TAKE 1 TABLET EVERY OTHER DAY       hydrOXYzine 25 MG tablet  Commonly known as: ATARAX      Take 0.5 tablets by mouth Every 8 (Eight) Hours As Needed for Anxiety.       lisinopril 40 MG tablet  Commonly known as: PRINIVIL,ZESTRIL      Take 1 tablet by mouth Daily.       MAGNESIUM PO      Take 1,000 mg by mouth Daily.       NON FORMULARY      Daily. Lutine eye vitamin       simvastatin 40 MG tablet  Commonly known as: ZOCOR      Take 1 tablet by mouth Every Night.

## 2023-08-18 ENCOUNTER — TELEPHONE (OUTPATIENT)
Dept: FAMILY MEDICINE CLINIC | Age: 78
End: 2023-08-18

## 2023-08-18 LAB
AORTIC ARCH: 2.4 CM
AORTIC DIMENSIONLESS INDEX: 0.4 (DI)
ASCENDING AORTA: 3.3 CM
BH CV ECHO AV AORTIC VALVE AT ACCEL TIME CALCULATED: NORMAL MSEC
BH CV ECHO MEAS - ACS: 1.57 CM
BH CV ECHO MEAS - AO MAX PG: 29.8 MMHG
BH CV ECHO MEAS - AO MEAN PG: 18.5 MMHG
BH CV ECHO MEAS - AO ROOT DIAM: 2.9 CM
BH CV ECHO MEAS - AO V2 MAX: 272.8 CM/SEC
BH CV ECHO MEAS - AO V2 VTI: 59.7 CM
BH CV ECHO MEAS - AT: 85 SEC
BH CV ECHO MEAS - AVA(I,D): 1.05 CM2
BH CV ECHO MEAS - EDV(CUBED): 117.6 ML
BH CV ECHO MEAS - EDV(MOD-SP2): 107 ML
BH CV ECHO MEAS - EDV(MOD-SP4): 98 ML
BH CV ECHO MEAS - EF(MOD-BP): 59.9 %
BH CV ECHO MEAS - EF(MOD-SP2): 61.7 %
BH CV ECHO MEAS - EF(MOD-SP4): 60.2 %
BH CV ECHO MEAS - ESV(CUBED): 36.6 ML
BH CV ECHO MEAS - ESV(MOD-SP2): 41 ML
BH CV ECHO MEAS - ESV(MOD-SP4): 39 ML
BH CV ECHO MEAS - FS: 32.2 %
BH CV ECHO MEAS - IVS/LVPW: 1.22 CM
BH CV ECHO MEAS - IVSD: 1.1 CM
BH CV ECHO MEAS - LAT PEAK E' VEL: 7.7 CM/SEC
BH CV ECHO MEAS - LV DIASTOLIC VOL/BSA (35-75): 54.1 CM2
BH CV ECHO MEAS - LV MASS(C)D: 176 GRAMS
BH CV ECHO MEAS - LV MAX PG: 4 MMHG
BH CV ECHO MEAS - LV MEAN PG: 2 MMHG
BH CV ECHO MEAS - LV SYSTOLIC VOL/BSA (12-30): 21.5 CM2
BH CV ECHO MEAS - LV V1 MAX: 100 CM/SEC
BH CV ECHO MEAS - LV V1 VTI: 21.1 CM
BH CV ECHO MEAS - LVIDD: 4.9 CM
BH CV ECHO MEAS - LVIDS: 3.3 CM
BH CV ECHO MEAS - LVOT AREA: 3 CM2
BH CV ECHO MEAS - LVOT DIAM: 1.95 CM
BH CV ECHO MEAS - LVPWD: 0.9 CM
BH CV ECHO MEAS - MED PEAK E' VEL: 6.4 CM/SEC
BH CV ECHO MEAS - MR MAX PG: 32.5 MMHG
BH CV ECHO MEAS - MR MAX VEL: 285 CM/SEC
BH CV ECHO MEAS - MV A DUR: 0.14 SEC
BH CV ECHO MEAS - MV A MAX VEL: 129 CM/SEC
BH CV ECHO MEAS - MV DEC SLOPE: 378.1 CM/SEC2
BH CV ECHO MEAS - MV DEC TIME: 0.27 MSEC
BH CV ECHO MEAS - MV E MAX VEL: 96.4 CM/SEC
BH CV ECHO MEAS - MV E/A: 0.75
BH CV ECHO MEAS - MV MAX PG: 8.3 MMHG
BH CV ECHO MEAS - MV MEAN PG: 3 MMHG
BH CV ECHO MEAS - MV P1/2T: 69.4 MSEC
BH CV ECHO MEAS - MV V2 VTI: 33.8 CM
BH CV ECHO MEAS - MVA(P1/2T): 3.2 CM2
BH CV ECHO MEAS - MVA(VTI): 1.86 CM2
BH CV ECHO MEAS - PA ACC TIME: 0.08 SEC
BH CV ECHO MEAS - PA V2 MAX: 129 CM/SEC
BH CV ECHO MEAS - PULM A REVS DUR: 0.12 SEC
BH CV ECHO MEAS - PULM A REVS VEL: 27.5 CM/SEC
BH CV ECHO MEAS - PULM DIAS VEL: 44.5 CM/SEC
BH CV ECHO MEAS - PULM S/D: 1.15
BH CV ECHO MEAS - PULM SYS VEL: 51.4 CM/SEC
BH CV ECHO MEAS - QP/QS: 0.8
BH CV ECHO MEAS - RAP SYSTOLE: 3 MMHG
BH CV ECHO MEAS - RV MAX PG: 2.6 MMHG
BH CV ECHO MEAS - RV V1 MAX: 80.1 CM/SEC
BH CV ECHO MEAS - RV V1 VTI: 15.8 CM
BH CV ECHO MEAS - RVOT DIAM: 2.01 CM
BH CV ECHO MEAS - RVSP: 23.8 MMHG
BH CV ECHO MEAS - SI(MOD-SP2): 36.4 ML/M2
BH CV ECHO MEAS - SI(MOD-SP4): 32.6 ML/M2
BH CV ECHO MEAS - SUP REN AO DIAM: 1.9 CM
BH CV ECHO MEAS - SV(LVOT): 62.9 ML
BH CV ECHO MEAS - SV(MOD-SP2): 66 ML
BH CV ECHO MEAS - SV(MOD-SP4): 59 ML
BH CV ECHO MEAS - SV(RVOT): 50.3 ML
BH CV ECHO MEAS - TAPSE (>1.6): 2.03 CM
BH CV ECHO MEAS - TR MAX PG: 20.8 MMHG
BH CV ECHO MEAS - TR MAX VEL: 228 CM/SEC
BH CV ECHO MEASUREMENTS AVERAGE E/E' RATIO: 13.67
BH CV XLRA - RV BASE: 2.17 CM
BH CV XLRA - RV LENGTH: 7.4 CM
BH CV XLRA - RV MID: 2.38 CM
BH CV XLRA - TDI S': 12.4 CM/SEC
LEFT ATRIUM VOLUME INDEX: 25 ML/M2
MAXIMAL PREDICTED HEART RATE: 141 BPM
SINUS: 2.8 CM
STRESS TARGET HR: 120 BPM

## 2023-08-18 NOTE — TELEPHONE ENCOUNTER
Pt inf but she is requesting a referral : [facility]     What is the office location: [place]

## 2023-08-18 NOTE — TELEPHONE ENCOUNTER
Caller: Pooja Chau    Relationship: Self    Best call back number: [phone]     What is the medical concern/diagnosis: KNEES - WAS SUPPOSED TO GET A KNEE REPLACEMENT ON LEFT KNEE - STILL HAVING SWELLING, FEELS WARM    What specialty or service is being requested: ORTHOPEDIC    What is the provider, practice or medical service name: [facility]    What is the office location: [facility]      Any additional details: PATIENT REPORTS Risa Borrego APRN TOLD HER A NEW ORTHOPEDIC WAS COMING [place], WANTS TO SCHEDULE WITH HIM/HER DOES NOT WANT TO SEE WHO IS ALREADY THERE.

## 2023-08-24 DIAGNOSIS — M17.12 OSTEOARTHRITIS OF LEFT KNEE, UNSPECIFIED OSTEOARTHRITIS TYPE: Primary | ICD-10-CM

## 2023-08-30 ENCOUNTER — TELEPHONE (OUTPATIENT)
Dept: ORTHOPEDIC SURGERY | Facility: CLINIC | Age: 78
End: 2023-08-30
Payer: MEDICARE

## 2023-08-30 DIAGNOSIS — M25.562 LEFT KNEE PAIN, UNSPECIFIED CHRONICITY: Primary | ICD-10-CM

## 2023-08-30 NOTE — TELEPHONE ENCOUNTER
PATIENT WAS CALLED IN REGARDS TO APPT IN [place] 9/1/2023. PATIENT LET US KNOW SHE WILL CALL BACK TO RESCHEDULE, WAS NOTIFIED TO GET XRAY BEFORE APPT.

## 2023-09-21 ENCOUNTER — TELEPHONE (OUTPATIENT)
Dept: ORTHOPEDIC SURGERY | Facility: CLINIC | Age: 78
End: 2023-09-21
Payer: MEDICARE

## 2023-09-21 NOTE — TELEPHONE ENCOUNTER
LEFT KNEE PAIN, SURGERY IN 2015 BY  IN [place], NOTES IN MEDIA-LEFT KNEE ARTHROSCOPY WITH PARTIAL MEDIAL AND LATERAL MENISCECTOMY, LEFT KNEE ARTHROSCOPY WITH MEDIAL FEMORAL CHONDROPLASTY

## 2023-09-25 ENCOUNTER — OFFICE VISIT (OUTPATIENT)
Dept: CARDIOLOGY | Facility: CLINIC | Age: 78
End: 2023-09-25

## 2023-09-25 ENCOUNTER — CLINICAL SUPPORT NO REQUIREMENTS (OUTPATIENT)
Dept: CARDIOLOGY | Facility: CLINIC | Age: 78
End: 2023-09-25

## 2023-09-25 ENCOUNTER — TELEPHONE (OUTPATIENT)
Dept: CARDIOLOGY | Facility: CLINIC | Age: 78
End: 2023-09-25

## 2023-09-25 VITALS
BODY MASS INDEX: 29.19 KG/M2 | HEART RATE: 86 BPM | DIASTOLIC BLOOD PRESSURE: 84 MMHG | SYSTOLIC BLOOD PRESSURE: 146 MMHG | WEIGHT: 171 LBS | HEIGHT: 64 IN

## 2023-09-25 DIAGNOSIS — Z95.0 PRESENCE OF CARDIAC PACEMAKER: ICD-10-CM

## 2023-09-25 DIAGNOSIS — I10 ESSENTIAL HYPERTENSION: ICD-10-CM

## 2023-09-25 DIAGNOSIS — Z95.2 S/P TAVR (TRANSCATHETER AORTIC VALVE REPLACEMENT): ICD-10-CM

## 2023-09-25 DIAGNOSIS — I44.2 COMPLETE HEART BLOCK: Primary | ICD-10-CM

## 2023-09-25 DIAGNOSIS — I35.0 AORTIC STENOSIS, SEVERE: Primary | ICD-10-CM

## 2023-09-25 DIAGNOSIS — I44.2 COMPLETE HEART BLOCK: ICD-10-CM

## 2023-09-25 PROCEDURE — 93280 PM DEVICE PROGR EVAL DUAL: CPT | Performed by: INTERNAL MEDICINE

## 2023-09-25 PROCEDURE — 1159F MED LIST DOCD IN RCRD: CPT | Performed by: NURSE PRACTITIONER

## 2023-09-25 PROCEDURE — 1160F RVW MEDS BY RX/DR IN RCRD: CPT | Performed by: NURSE PRACTITIONER

## 2023-09-25 PROCEDURE — 99214 OFFICE O/P EST MOD 30 MIN: CPT | Performed by: NURSE PRACTITIONER

## 2023-09-25 PROCEDURE — 93000 ELECTROCARDIOGRAM COMPLETE: CPT | Performed by: NURSE PRACTITIONER

## 2023-09-25 PROCEDURE — 3079F DIAST BP 80-89 MM HG: CPT | Performed by: NURSE PRACTITIONER

## 2023-09-25 PROCEDURE — 3077F SYST BP >= 140 MM HG: CPT | Performed by: NURSE PRACTITIONER

## 2023-09-25 RX ORDER — FLUOXETINE HYDROCHLORIDE 20 MG/1
20 CAPSULE ORAL DAILY
COMMUNITY

## 2023-09-25 RX ORDER — CARVEDILOL 3.12 MG/1
3.12 TABLET ORAL 2 TIMES DAILY
Qty: 60 TABLET | Refills: 11 | Status: SHIPPED | OUTPATIENT
Start: 2023-09-25

## 2023-09-25 NOTE — TELEPHONE ENCOUNTER
----- Message from Tanner Hester MD sent at 9/25/2023  2:09 PM EDT -----  Regarding: RE: B/p   carvedilol 3.125 mg twice daily would be fine.  Thanks.  ===View-only below this line===  ----- Message -----  From: Johanny Welsh APRN  Sent: 9/25/2023  11:41 AM EDT  To: Tanner Hester MD  Subject: B/p                                              I saw her today for her pacemaker check, you saw her in August, she is a status post TAVR in June    You had asked her to monitor her blood pressure as it was high when you saw her in August.    Readings she brought me today where [id number]--- that is over about the last 8 days.    She is on amlodipine 5 mg daily, your note mentions increasing it however she is concerned as she has some intermittent mild pedal edema.    Just want to be sure that is what you want to do as she is already on lisinopril 40 already you want to try something different---? Carvedilol       ----- Message -----  From: Johanny Welsh APRN  Sent: 9/25/2023  11:41 AM EDT  To: Tanner Hester MD  Subject: B/p                                              I saw her today for her pacemaker check, you saw her in August, she is a status post TAVR in June    You had asked her to monitor her blood pressure as it was high when you saw her in August.    Readings she brought me today where [id number]--- that is over about the last 8 days.    She is on amlodipine 5 mg daily, your note mentions increasing it however she is concerned as she has some intermittent mild pedal edema.    Just want to be sure that is what you want to do as she is already on lisinopril 40 already you want to try something different---? Carvedilol

## 2023-09-25 NOTE — TELEPHONE ENCOUNTER
Let her know we are going to try carvedilol 3.125 mg BID for her b/p --she can monitor for another few weeks once she starts it and let Dr. Hester or Guerline know if b/p still elevated    Prescription sent to her pharmacy

## 2023-09-25 NOTE — PROGRESS NOTES
Date of Office Visit: 2023  Encounter Provider: ANN Velez  Place of Service: [facility] CARDIOLOG[facility]  Patient Name: Pooja Chau  :1945    Chief Complaint   Patient presents with    chronic diastolic CHF    complete heart block    Pacemaker Check   :     HPI: Pooja Chau is a 78 y.o. female who follows with Dr. Hester--- severe aortic stenosis and hypertension, status post TAVR in 2023, she had postop heart block and underwent implantation of a permanent pacemaker by Dr. Arechiga on 2023.    She presents for follow up and device check.     She is doing well. No chest pain, dyspnea, PND, orthopnea or palpitations.    She does have some mild intermittent pedal edema she reports.    Device interrogation shows normal testing and function. A paced, V paced 99.5. No arrhythmia events.     Trouble with joints/knees---see is going to see ortho.     Past Medical History:   Diagnosis Date    Aortic stenosis     Back pain     Bronchitis     Claustrophobia     Heart disease     Hypertension     Pneumonia        Past Surgical History:   Procedure Laterality Date    AORTIC VALVE REPAIR/REPLACEMENT N/A 2023    Procedure: TRANSFEMORAL TRANSCATHETER AORTIC VALVE REPLACEMENT;  Surgeon: Dalton Neumann MD;  Location: [facility];  Service: Cardiothoracic;  Laterality: N/A;    AORTIC VALVE REPAIR/REPLACEMENT N/A 2023    Procedure: Transfemoral Transcatheter Aortic Valve Replacement;  Surgeon: Tanner Hester MD;  Location: [facility];  Service: Cardiovascular;  Laterality: N/A;    CARDIAC CATHETERIZATION N/A 2023    Procedure: Left Heart Cath;  Surgeon: Tanner Hester MD;  Location: [facility] CATH INVASIVE LOCATION;  Service: Cardiology;  Laterality: N/A;  TAVR work up    CARDIAC ELECTROPHYSIOLOGY PROCEDURE N/A 2023    Procedure: Pacemaker DC new medtronic;  Surgeon: Viet Arechiga MD;  Location: [facility] CATH INVASIVE  LOCATION;  Service: Cardiovascular;  Laterality: N/A;    CERVICAL DISCECTOMY ANTERIOR  10/2018    c4-c7 with fusion    HYSTERECTOMY      at age 39   with BLSO    KNEE SURGERY Bilateral     2x/s in right   1 x in left    arthroscopy      SHOULDER SURGERY Left        Social History     Socioeconomic History    Marital status:     Number of children: 2   Tobacco Use    Smoking status: Never    Smokeless tobacco: Never   Vaping Use    Vaping Use: Never used   Substance and Sexual Activity    Alcohol use: Yes     Comment: VERY RARELY/ONCE A MONTH/socially    Drug use: Never    Sexual activity: Defer       Family History   Problem Relation Age of Onset    Diabetes Mother         complications from DM    Coronary artery disease Father     Stroke Father     Diabetes Maternal Grandmother     Depression Sister     Depression Brother        Review of Systems   Constitutional: Negative for chills, fever and malaise/fatigue.   Cardiovascular:  Negative for chest pain, dyspnea on exertion, leg swelling, near-syncope, orthopnea, palpitations, paroxysmal nocturnal dyspnea and syncope.   Respiratory:  Negative for cough and shortness of breath.    Musculoskeletal:  Negative for joint pain, joint swelling and myalgias.   Gastrointestinal:  Negative for abdominal pain, diarrhea, melena, nausea and vomiting.   Genitourinary:  Negative for frequency and hematuria.   Neurological:  Negative for light-headedness, numbness, paresthesias and seizures.   Allergic/Immunologic: Negative.    All other systems reviewed and are negative.    Allergies   Allergen Reactions    Nsaids Swelling    Codeine Nausea Only         Current Outpatient Medications:     acetaminophen (TYLENOL) 325 MG tablet, Take 2 tablets by mouth Every 4 (Four) Hours As Needed for Mild Pain., Disp: , Rfl:     amLODIPine (NORVASC) 5 MG tablet, Take 1 tablet by mouth Daily., Disp: 90 tablet, Rfl: 3    aspirin 81 MG chewable tablet, Chew 1 tablet Daily., Disp: , Rfl:      "cyclobenzaprine (FLEXERIL) 10 MG tablet, Take 1 tablet by mouth 3 (Three) Times a Day As Needed for Muscle Spasms., Disp: , Rfl:     FLUoxetine (PROzac) 20 MG capsule, Take 1 capsule by mouth Daily., Disp: , Rfl:     hydroCHLOROthiazide (HYDRODIURIL) 25 MG tablet, Take 1 tablet by mouth Daily., Disp: 90 tablet, Rfl: 3    hydroxyurea (HYDREA) 500 MG capsule, TAKE 1 TABLET EVERY OTHER DAY, Disp: , Rfl:     hydrOXYzine (ATARAX) 25 MG tablet, Take 0.5 tablets by mouth Every 8 (Eight) Hours As Needed for Anxiety., Disp: 90 tablet, Rfl: 1    lisinopril (PRINIVIL,ZESTRIL) 40 MG tablet, Take 1 tablet by mouth Daily., Disp: , Rfl:     MAGNESIUM PO, Take 1,000 mg by mouth Daily., Disp: , Rfl:     NON FORMULARY, Daily. Lutine eye vitamin, Disp: , Rfl:     simvastatin (ZOCOR) 40 MG tablet, Take 1 tablet by mouth Every Night., Disp: 90 tablet, Rfl: 3      Objective:     Vitals:    09/25/23 1047   BP: 146/84   Pulse: 86   Weight: 77.6 kg (171 lb)   Height: 162.6 cm (64\")     Body mass index is 29.35 kg/m².    PHYSICAL EXAM:    Vitals Reviewed.   General Appearance: No acute distress, well developed and well nourished.   Eyes: Conjunctiva and lids: No erythema, swelling, or discharge. Sclera non-icteric.   HENT: Atraumatic, normocephalic. External eyes, ears, and nose normal.   Respiratory: No signs of respiratory distress. Respiration rhythm and depth normal.   Clear to auscultation. No rales, crackles, rhonchi, or wheezing auscultated.   Cardiovascular:  Heart Rate and Rhythm: Normal, Heart Sounds: S1 and S2.   Lower Extremities: No edema noted.  Gastrointestinal:  Abdomen soft, non-distended, non-tender.   Musculoskeletal: Normal movement of extremities  Skin: Warm and dry.   Psychiatric: Patient alert and oriented to person, place, and time. Speech and behavior appropriate. Normal mood and affect.       ECG 12 Lead    Date/Time: 9/25/2023 11:17 AM  Performed by: Johanny Welsh APRN  Authorized by: Johanny Welsh APRN "   Comparison: compared with previous ECG   Similar to previous ECG  BPM: 86  Pacing: atrial sensed rhythm, ventricular paced rhythm and 100% capture        Assessment:       Diagnosis Plan   1. Aortic stenosis, severe  ECG 12 Lead      2. S/P TAVR (transcatheter aortic valve replacement)  ECG 12 Lead      3. Complete heart block  ECG 12 Lead      4. Presence of cardiac pacemaker  ECG 12 Lead      5. Essential hypertension               Plan:       1.-2.  Severe aortic stenosis, status post TAVR in June, follows with Dr. Hester.    3.-4.  Complete heart block, post TAVR, status post permanent pacemaker.  Normal testing and function.    5.  Hypertension, she saw Dr. Hester in August and her blood pressure was elevated then, he had asked her to keep a log and report back to him, she has brought me some readings today that I will make sure and forward to him, my thought is probably to increase her amlodipine which is what his note says but she is worried it may cause more lower extremity edema.  Will discuss with him and let her know.    Follow-up with Dr. Arechiga in 6 months with device check, follow-up with Dr. Hester in June as scheduled.    As always, it has been a pleasure to participate in your patient's care.      Sincerely,         ANN Raymond

## 2023-10-04 ENCOUNTER — PREP FOR SURGERY (OUTPATIENT)
Dept: OTHER | Facility: HOSPITAL | Age: 78
End: 2023-10-04
Payer: MEDICARE

## 2023-10-04 ENCOUNTER — OFFICE VISIT (OUTPATIENT)
Dept: ORTHOPEDIC SURGERY | Facility: CLINIC | Age: 78
End: 2023-10-04
Payer: MEDICARE

## 2023-10-04 VITALS
DIASTOLIC BLOOD PRESSURE: 74 MMHG | BODY MASS INDEX: 28.17 KG/M2 | OXYGEN SATURATION: 95 % | HEART RATE: 86 BPM | WEIGHT: 165 LBS | HEIGHT: 64 IN | SYSTOLIC BLOOD PRESSURE: 149 MMHG

## 2023-10-04 DIAGNOSIS — M17.11 PRIMARY OSTEOARTHRITIS OF RIGHT KNEE: ICD-10-CM

## 2023-10-04 DIAGNOSIS — M25.561 RIGHT KNEE PAIN, UNSPECIFIED CHRONICITY: Primary | ICD-10-CM

## 2023-10-04 DIAGNOSIS — M17.11 PRIMARY OSTEOARTHRITIS OF RIGHT KNEE: Primary | ICD-10-CM

## 2023-10-04 PROBLEM — M17.0 DEGENERATIVE ARTHRITIS OF KNEE, BILATERAL: Status: ACTIVE | Noted: 2023-10-04

## 2023-10-04 RX ORDER — TRANEXAMIC ACID 10 MG/ML
1000 INJECTION, SOLUTION INTRAVENOUS ONCE
Status: CANCELLED | OUTPATIENT
Start: 2023-10-04 | End: 2023-10-04

## 2023-10-04 RX ORDER — CEFAZOLIN SODIUM 2 G/100ML
2 INJECTION, SOLUTION INTRAVENOUS ONCE
OUTPATIENT
Start: 2023-10-04 | End: 2023-10-04

## 2023-10-04 RX ORDER — CEFAZOLIN SODIUM IN 0.9 % NACL 3 G/100 ML
3 INTRAVENOUS SOLUTION, PIGGYBACK (ML) INTRAVENOUS ONCE
OUTPATIENT
Start: 2023-10-04 | End: 2023-10-04

## 2023-10-04 NOTE — PROGRESS NOTES
"Chief Complaint  Pain and Initial Evaluation of the Right Knee    Subjective          Pooja Chau presents to [facility] for   History of Present Illness    The patient presents here today for evaluation of the right knee. The patient reports bilateral knee pain, right worse than the left. She reports 2 previous right knee scopes. She reports right knee pain for awhile. She denies a new injury.  She had a cardiac valve replacement in June of this year.  She reports she is on 81 mg aspirin only.  She reports she has been cleared by cardiology for other procedures that she has planned in the future.    Allergies   Allergen Reactions    Nsaids Swelling    Codeine Nausea Only        Social History     Socioeconomic History    Marital status:     Number of children: 2   Tobacco Use    Smoking status: Never    Smokeless tobacco: Never   Vaping Use    Vaping Use: Never used   Substance and Sexual Activity    Alcohol use: Yes     Comment: VERY RARELY/ONCE A MONTH/socially    Drug use: Never    Sexual activity: Defer        I reviewed the patient's chief complaint, history of present illness, review of systems, past medical history, surgical history, family history, social history, medications, and allergy list.     REVIEW OF SYSTEMS    Constitutional: Denies fevers, chills, weight loss  Cardiovascular: Denies chest pain, shortness of breath  Skin: Denies rashes, acute skin changes  Neurologic: Denies headache, loss of consciousness  MSK: Right knee pain      Objective   Vital Signs:   /74   Pulse 86   Ht 162.6 cm (64\")   Wt 74.8 kg (165 lb)   SpO2 95%   BMI 28.32 kg/m²     Body mass index is 28.32 kg/m².    Physical Exam    General: Alert. No acute distress.   Right knee- Positive EHL, FHL, GS and TA. Sensation intact to all 5 nerves of the foot. Positive pulses. ROM -5 to 110 degrees. Mild effusion. Stable to varus/valgus stress. Stable to anterior/posterior drawer. " Negative Lachman's. Negative Fredo's.  Full hip motion without pain.    Procedures    Imaging Results (Most Recent)       Procedure Component Value Units Date/Time    XR Knee 4+ View Right [[id number]] Resulted: 10/04/23 1253     Updated: 10/04/23 1254    Narrative:      Indications: Right knee pain    Views: Weightbearing AP, PA flexion, lateral, sunrise right knee    Findings: Advanced tricompartmental degenerative changes.  Complete loss   of articular height in the patellofemoral compartment.  Valgus alignment.    Osteophyte formation noted.  No fractures.  Lateral tracking of the   patella on the sunrise view.    Comparative Data: No comparative data available                     Assessment and Plan        XR Knee 4+ View Right    Result Date: 10/4/2023  Narrative: Indications: Right knee pain Views: Weightbearing AP, PA flexion, lateral, sunrise right knee Findings: Advanced tricompartmental degenerative changes.  Complete loss of articular height in the patellofemoral compartment.  Valgus alignment.  Osteophyte formation noted.  No fractures.  Lateral tracking of the patella on the sunrise view. Comparative Data: No comparative data available      Diagnoses and all orders for this visit:    1. Right knee pain, unspecified chronicity (Primary)  -     XR Knee 4+ View Right    2. Primary osteoarthritis of right knee        Discussed the treatment options with the patient, operative vs non-operative. I reviewed the x-rays that were obtained today with the patient. Discussed the risks and benefits of a Right Total Knee Arthroplasty with [employer] versus nonoperative management with injections, exercises, activity modification.  The patient reports she has been unhappy with previous injections.  She is interested in surgical management.  The patient expressed understanding and wished to proceed. She will need cardiac clearance.        Discussed surgery., Risks/benefits discussed with patient including, but not  limited to: infection, bleeding, neurovascular damage, malunion, nonunion, aesthetic deformity, need for further surgery, and death., Discussed with patient the implant type being used during surgery and patient understands., Surgery pamphlet given., Call or return if worsening symptoms., and DME order for a 3 in 1 given today due to patient will be confined to one room/level of the home that does not offer a toilet during postop recovery.     Scribed for Missael Parmar MD by Melodie Loya  10/04/2023   10:45 EDT         Follow Up       2 weeks postop    Patient was given instructions and counseling regarding her condition or for health maintenance advice. Please see specific information pulled into the AVS if appropriate.       I have personally performed the services described in this document as scribed by the above individual and it is both accurate and complete.     Missael Parmar MD  10/04/23  15:56 EDT

## 2023-10-05 PROCEDURE — 93294 REM INTERROG EVL PM/LDLS PM: CPT | Performed by: INTERNAL MEDICINE

## 2023-10-05 PROCEDURE — 93296 REM INTERROG EVL PM/IDS: CPT | Performed by: INTERNAL MEDICINE

## 2023-10-06 ENCOUNTER — TELEPHONE (OUTPATIENT)
Age: 78
End: 2023-10-06
Payer: MEDICARE

## 2023-10-06 NOTE — TELEPHONE ENCOUNTER
I called the office and left a message for Larissa and faxed a letter to their office in [facility] [place].    Rachael

## 2023-10-06 NOTE — TELEPHONE ENCOUNTER
Larissa with  Ortho called. They are wanting to schedule her for a total knee replacement. They are wanting to know how long after her 6/22/23 pacemaker placement, she should wait. Or if she is okay to move forward with scheduling now.    Please advise.    Thanks,  Rachael LEWIS#[phone]

## 2023-10-10 ENCOUNTER — TELEPHONE (OUTPATIENT)
Dept: ORTHOPEDIC SURGERY | Facility: CLINIC | Age: 78
End: 2023-10-10
Payer: MEDICARE

## 2023-10-10 NOTE — TELEPHONE ENCOUNTER
RECEIVED THE LETTER STATING PATIENT IS CLEAR FOR SURGERY.  I CALLED AND LEFT A MESSAGE FOR [facility] TO SEE IF PATIENT CAN COME OFF ASPIRIN 81MG PRIOR TO SURGERY AND HOW MANY DAYS SHE CAN HOLD ASPIRIN PRIOR TO SURGERY.

## 2023-10-13 ENCOUNTER — TELEPHONE (OUTPATIENT)
Dept: ORTHOPEDIC SURGERY | Facility: CLINIC | Age: 78
End: 2023-10-13
Payer: MEDICARE

## 2023-10-13 NOTE — TELEPHONE ENCOUNTER
I RECEIVED A CALL LESVIA [facility] OFFICE STATING THAT PATIENT COULD HOLD/STOP ASPIRIN FOR 3-5-DAYS PRIOR TO SURGERY.  I TRIED TO CALL THE PATIENT AND I LEFT A MESSAGE ON PATIENT'S PHONE OT CALL ME IN THE OFFICE.  DR MUÑIZ WOULD LIKE FOR PATIENT TO HOLD ASPIRIN FOR 5 DAYS PRIOR TO SURGERY.

## 2023-10-16 ENCOUNTER — TELEPHONE (OUTPATIENT)
Dept: ORTHOPEDIC SURGERY | Facility: CLINIC | Age: 78
End: 2023-10-16
Payer: MEDICARE

## 2023-10-16 NOTE — TELEPHONE ENCOUNTER
Caller: Pooja Chua    Relationship to patient: Self    Best call back number: [phone]    Patient is needing: PATIENT RETUNING CALL TO [facility] REGARDING LAST NOTE PUT IN. UNABLE TO WT

## 2023-10-17 NOTE — TELEPHONE ENCOUNTER
PATIENT WAS CALLED AND INFOMRED PER DR ACUNA THAT PATIENT CAN HOLD/STOP ASPIRIN FOR 5 DAYS PRIOR TO SURGERY.  PATIENT VOICES UNDERSTANDING.

## 2023-10-18 ENCOUNTER — TELEPHONE (OUTPATIENT)
Age: 78
End: 2023-10-18
Payer: MEDICARE

## 2023-10-19 ENCOUNTER — TELEPHONE (OUTPATIENT)
Dept: CARDIOLOGY | Facility: CLINIC | Age: 78
End: 2023-10-19
Payer: MEDICARE

## 2023-10-19 NOTE — TELEPHONE ENCOUNTER
"Pt called in with BP/HR logs as requested:    Date Time AM BP AM HR Time PM BP PM HR Notes  Weight   1-Oct  133/78 68  145/74 79      2-Oct  152/76 55  154/79 68      3-Oct  101/59 67  133/66 85      4-Oct  155/80          5-Oct  143/69 67  122/58 72      6-Oct  145/98 88  154/80 76      7-Oct  171/75 71  165/89 76      10-Oct     134/65 65      11-Oct  135/70 67  149/70 74   170   12-Oct  183/85 80      166   13-Oct  155/76 68      165   14-Oct  165/82 74  122/61 82      15-Oct  142/70 72  157/86 90      16-Oct  150/90 72           Pt states that she \"really doesn't like that carvedilol medication\".  She states that it makes her feel anxious, bloated, causes weight gain and dizziness.  She also states on days that she felt really anxious, she doubled her prozac.  She also feels like her BP's are running high for her.  Any recommendations?    Nika Morrison, RN  Triage RN  10/19/23 09:36 EDT    "

## 2023-10-20 NOTE — TELEPHONE ENCOUNTER
I spoke with pt and scheduled her to see Dr. Hester next week as requested.    Nika Morrison, RN  Triage RN  10/20/23 12:24 EDT

## 2023-10-20 NOTE — TELEPHONE ENCOUNTER
Dr. Hester is her primary cardiologist--she was suppose to see him 10/16---not sure what happen but probably maybe can get rescheduled with him on his APRN

## 2023-10-20 NOTE — TELEPHONE ENCOUNTER
Fax did not go through.    Left voicemail for Dr. Howard's surgery scheduler to give me a call back with correct fax #.

## 2023-10-26 ENCOUNTER — OFFICE VISIT (OUTPATIENT)
Age: 78
End: 2023-10-26
Payer: MEDICARE

## 2023-10-26 VITALS
DIASTOLIC BLOOD PRESSURE: 84 MMHG | HEIGHT: 64 IN | HEART RATE: 62 BPM | SYSTOLIC BLOOD PRESSURE: 132 MMHG | BODY MASS INDEX: 29.23 KG/M2 | WEIGHT: 171.2 LBS

## 2023-10-26 DIAGNOSIS — Z95.0 PRESENCE OF CARDIAC PACEMAKER: ICD-10-CM

## 2023-10-26 DIAGNOSIS — Z95.2 S/P TAVR (TRANSCATHETER AORTIC VALVE REPLACEMENT): ICD-10-CM

## 2023-10-26 DIAGNOSIS — I10 PRIMARY HYPERTENSION: ICD-10-CM

## 2023-10-26 DIAGNOSIS — I10 ESSENTIAL HYPERTENSION: ICD-10-CM

## 2023-10-26 DIAGNOSIS — I35.0 AORTIC STENOSIS, SEVERE: Primary | ICD-10-CM

## 2023-10-26 DIAGNOSIS — I44.2 COMPLETE HEART BLOCK: ICD-10-CM

## 2023-10-26 PROCEDURE — 3075F SYST BP GE 130 - 139MM HG: CPT | Performed by: INTERNAL MEDICINE

## 2023-10-26 PROCEDURE — 93000 ELECTROCARDIOGRAM COMPLETE: CPT | Performed by: INTERNAL MEDICINE

## 2023-10-26 PROCEDURE — 99214 OFFICE O/P EST MOD 30 MIN: CPT | Performed by: INTERNAL MEDICINE

## 2023-10-26 PROCEDURE — 3079F DIAST BP 80-89 MM HG: CPT | Performed by: INTERNAL MEDICINE

## 2023-10-26 RX ORDER — AMLODIPINE BESYLATE 10 MG/1
10 TABLET ORAL DAILY
Qty: 30 TABLET | Refills: 6 | Status: SHIPPED | OUTPATIENT
Start: 2023-10-26

## 2023-10-26 RX ORDER — ROSUVASTATIN CALCIUM 20 MG/1
20 TABLET, COATED ORAL DAILY
Qty: 30 TABLET | Refills: 6 | Status: SHIPPED | OUTPATIENT
Start: 2023-10-26 | End: 2023-10-30

## 2023-10-26 NOTE — PROGRESS NOTES
GYN new patient    CC: Labial mass    Tobacco/Nicotine use:  No    HPI:   78 y.o. Contraception or HRT: s/p HYST BSO, benign indications at age 39    Patient states she has a history of vulvar dysplasia that she detected herself on exam and was subsequently treated with a partial vulvectomy several years prior.  Recently the patient has noticed that mass of concern on the left labia minora      History: PMHx, Meds, Allergies, PSHx, Social Hx, and POBHx all reviewed and updated.  PCP:Risa Borrego APRN      Review of Systems     /76   Pulse 85   Wt 77.1 kg (170 lb)   Breastfeeding No   BMI 29.18 kg/m²     Physical Exam  Vitals and nursing note reviewed. Exam conducted with a chaperone present.   Genitourinary:     Exam position: Lithotomy position.             ASSESSMENT AND PLAN:  Problem Visit    Diagnoses and all orders for this visit:    1. Dysplasia of vulva (Primary)  Assessment & Plan:  Pt states she was diagnosed with JOAQUIM in 2016 and had a partial vulvectomy  Since then she has not had any further recurrences  Today patient states she felt a similar lesion 1-2 months prior.  Pt found the initial lesion herself  On exam patient indicates the inferior aspect of the left labia minora.  There is no obvious lesion with skin changes.  However the tissue does appear somewhat thickened.  Given the patient's previous history of vulvar dysplasia have asked the patient to return for a vulvar biopsy        2. Genitourinary syndrome of menopause  Assessment & Plan:  Pt with severe vulvovaginal atrophy on exam.  Vulvar architecture on the pt's right side is surgically altered.  Moderate resorption of left labia minora    Orders:  -     estradiol (Vagifem) 10 MCG tablet vaginal tablet; Insert 1 tablet into the vagina 2 (Two) Times a Week.  Dispense: 24 tablet; Refill: 3                  Follow Up:  Return for vulvar biopsy of left labia minora.        Airam Rae MD  10/30/2023

## 2023-10-26 NOTE — PROGRESS NOTES
Date of Office Visit: 2023  Encounter Provider: Tanner Hester MD  Place of Service: [facility]  Patient Name: Pooja Chau  :1945    Chief Complaint   Patient presents with    Hypertension     Patient is in the office today for her increased Blood Pressure.         HPI: Pooja Chau is a 78 y.o. with a medical history of severe degenerative aortic valve stenosis that has been asymptomatic.  She also has a history of hypertension, hyperlipidemia, obstructive sleep apnea and recently diagnosed myeloproliferative neoplasm.  When she was last seen in the office she was noticing increasing dyspnea on exertion.  She underwent left heart cath on 2023 and was evaluated by structural heart team for possible TAVR.  Left heart cath showed normal coronaries.    On 2023, she underwent successful transcatheter aortic valve replacement with 26 mm STEVEN Ultra transcatheter aortic heart valve with Saw Hester and Thien.  Postprocedure echocardiogram the next day shows normal LV function, TAVR valve is new to me today well-seated with normal gradients.  Patient had a previous right bundle branch block prior to procedure and subsequently developed complete heart block.  She was seen by Dr. Arechiga and it was felt strongly that her AV conduction will not recover.  She underwent dual-chamber permanent pacemaker placement on 2023 with Medtronic device.    She has had issues with elevation in her blood pressure which she has noticed as of late.  In addition she complains of significant fatigue that last throughout the day.  She is having to take naps.  She also reports lightheadedness early in the morning that tends to resolve on its own.    Previous testing and notes have been reviewed by me.     Past Medical History:   Diagnosis Date    Aortic stenosis     Back pain     Bronchitis     Claustrophobia     Heart disease     Hypertension      Pneumonia        Past Surgical History:   Procedure Laterality Date    AORTIC VALVE REPAIR/REPLACEMENT N/A 6/21/2023    Procedure: TRANSFEMORAL TRANSCATHETER AORTIC VALVE REPLACEMENT;  Surgeon: Dalton Neumann MD;  Location: [facility];  Service: Cardiothoracic;  Laterality: N/A;    AORTIC VALVE REPAIR/REPLACEMENT N/A 6/21/2023    Procedure: Transfemoral Transcatheter Aortic Valve Replacement;  Surgeon: Tanner Hester MD;  Location: [facility];  Service: Cardiovascular;  Laterality: N/A;    CARDIAC CATHETERIZATION N/A 6/2/2023    Procedure: Left Heart Cath;  Surgeon: Tanner Hester MD;  Location: [facility] CATH INVASIVE LOCATION;  Service: Cardiology;  Laterality: N/A;  TAVR work up    CARDIAC ELECTROPHYSIOLOGY PROCEDURE N/A 6/22/2023    Procedure: Pacemaker DC new medtronic;  Surgeon: Viet Arechiga MD;  Location:  [facility] CATH INVASIVE LOCATION;  Service: Cardiovascular;  Laterality: N/A;    CERVICAL DISCECTOMY ANTERIOR  10/2018    c4-c7 with fusion    HYSTERECTOMY      at age 39   with BLSO    KNEE SURGERY Bilateral     2x/s in right   1 x in left    arthroscopy      SHOULDER SURGERY Left        Social History     Socioeconomic History    Marital status:     Number of children: 2   Tobacco Use    Smoking status: Never    Smokeless tobacco: Never   Vaping Use    Vaping Use: Never used   Substance and Sexual Activity    Alcohol use: Yes     Comment: VERY RARELY/ONCE A MONTH/socially    Drug use: Never    Sexual activity: Defer       Family History   Problem Relation Age of Onset    Diabetes Mother         complications from DM    Coronary artery disease Father     Stroke Father     Diabetes Maternal Grandmother     Depression Sister     Depression Brother        Review of Systems   Constitutional: Negative. Negative for fever and malaise/fatigue.   HENT: Negative.  Negative for nosebleeds and sore throat.    Eyes: Negative.  Negative for blurred vision and double vision.    Cardiovascular: Negative.  Negative for chest pain, claudication, dyspnea on exertion, palpitations and syncope.   Respiratory: Negative.  Negative for cough, shortness of breath and snoring.    Endocrine: Negative.  Negative for cold intolerance, heat intolerance and polydipsia.   Hematologic/Lymphatic: Negative.    Skin: Negative.  Negative for itching, poor wound healing and rash.        Left groin site slightly hardened area   Musculoskeletal: Negative.  Negative for joint pain, joint swelling, muscle weakness and myalgias.   Gastrointestinal: Negative.  Negative for abdominal pain, melena, nausea and vomiting.   Genitourinary: Negative.    Neurological: Negative.  Negative for light-headedness, loss of balance, seizures, vertigo and weakness.   Psychiatric/Behavioral: Negative.  Negative for altered mental status and depression.    Allergic/Immunologic: Negative.        Allergies   Allergen Reactions    Nsaids Swelling    Codeine Nausea Only         Current Outpatient Medications:     acetaminophen (TYLENOL) 325 MG tablet, Take 2 tablets by mouth Every 4 (Four) Hours As Needed for Mild Pain., Disp: , Rfl:     amLODIPine (NORVASC) 5 MG tablet, Take 1 tablet by mouth Daily., Disp: 90 tablet, Rfl: 3    aspirin 81 MG chewable tablet, Chew 1 tablet Daily., Disp: , Rfl:     carvedilol (COREG) 3.125 MG tablet, Take 1 tablet by mouth 2 (Two) Times a Day., Disp: 60 tablet, Rfl: 11    cyclobenzaprine (FLEXERIL) 10 MG tablet, Take 1 tablet by mouth 3 (Three) Times a Day As Needed for Muscle Spasms., Disp: , Rfl:     FLUoxetine (PROzac) 20 MG capsule, Take 1 capsule by mouth Daily., Disp: , Rfl:     hydroCHLOROthiazide (HYDRODIURIL) 25 MG tablet, Take 1 tablet by mouth Daily., Disp: 90 tablet, Rfl: 3    hydroxyurea (HYDREA) 500 MG capsule, TAKE 1 TABLET EVERY OTHER DAY, Disp: , Rfl:     hydrOXYzine (ATARAX) 25 MG tablet, Take 0.5 tablets by mouth Every 8 (Eight) Hours As Needed for Anxiety., Disp: 90 tablet, Rfl: 1     "lisinopril (PRINIVIL,ZESTRIL) 40 MG tablet, Take 1 tablet by mouth Daily., Disp: , Rfl:     MAGNESIUM PO, Take 1,000 mg by mouth Daily., Disp: , Rfl:     NON FORMULARY, Daily. Lutine eye vitamin, Disp: , Rfl:     simvastatin (ZOCOR) 40 MG tablet, Take 1 tablet by mouth Every Night., Disp: 90 tablet, Rfl: 3      Objective:     Vitals:    10/26/23 1354   BP: 132/84   Pulse: 62   Weight: 77.7 kg (171 lb 3.2 oz)   Height: 162.6 cm (64\")     Body mass index is 29.39 kg/m².           PHYSICAL EXAM:    Constitutional:       Appearance: Healthy appearance. Not in distress.   Neck:      Vascular: No JVR. JVD normal.   Pulmonary:      Effort: Pulmonary effort is normal.      Breath sounds: Normal breath sounds. No wheezing. No rhonchi. No rales.   Chest:      Chest wall: Not tender to palpatation.   Cardiovascular:      PMI at left midclavicular line. Normal rate. Regular rhythm. Normal S1. Normal S2.       Murmurs: There is a early systolic murmur.      No gallop.  No click. No rub.   Pulses:     Intact distal pulses.   Edema:     Peripheral edema absent.   Abdominal:      General: Bowel sounds are normal.      Palpations: Abdomen is soft.      Tenderness: There is no abdominal tenderness.   Musculoskeletal: Normal range of motion.         General: No tenderness. Skin:     General: Skin is warm and dry.   Neurological:      General: No focal deficit present.      Mental Status: Alert and oriented to person, place and time.           ECG 12 Lead    Date/Time: 10/26/2023 2:25 PM  Performed by: Tanner Hester MD    Authorized by: Tanner Hester MD  Comparison: compared with previous ECG from 9/25/2023  Similar to previous ECG  Rhythm: paced    Clinical impression: abnormal EKG  Comments: AV paced          2D Echocardiogram 06/22/2023: (post TAVR)    Left ventricular systolic function is normal. Calculated left ventricular EF = 61.6%    Left ventricular wall thickness is consistent with hypertrophy. " Sigmoid-shaped ventricular septum is present.    Left ventricular diastolic function is consistent with (grade II w/high LAP) pseudonormalization.    There is a TAVR valve present.    Aortic valve area is 1.3 cm2.    Peak velocity of the flow distal to the aortic valve is 236.5 cm/s. Aortic valve maximum pressure gradient is 22 mmHg. Aortic valve mean pressure gradient is 12 mmHg. Aortic valve dimensionless index is 0.4 .    Estimated right ventricular systolic pressure from tricuspid regurgitation is normal (<35 mmHg).    Left Heat Cath 06/02/2023:  1. Left main: Normal  2. LAD: Normal  3. LCX: Normal  4. RCA: Luminal irregularities midsegment.    Recommendations: Move forward with CTA and surgical evaluation as part of the structural heart team consult for severe degenerative aortic valve stenosis      Assessment:     Plan:       1.  Severe degenerative aortic stenosis: s/p TAVR 06/21/2023.  Mean gradient 18 mmHg across the prosthetic valve in the setting of preserved to mildly hyperdynamic left ventricular systolic function.  Dimensionless index normal.  - Continue aspirin 81 mg p.o. daily lifelong.    2.  Complete heart block: s/p dual chambered permanent pacemaker (Medtronic) by Dr. Arechiga (06/22/23).  Functioning well.    3.  Hypertension: She does not seem to be tolerating the carvedilol well and as such I will stop that.  We will increase the amlodipine to 10 mg p.o. daily.  In the setting of the amlodipine increased I will plan on moving off the simvastatin therapy to [facility] for    4.  Hyperlipidemia: Moved to [facility] in the setting of elevated LDL and calcium channel blocker therapy       Your medication list            Accurate as of October 26, 2023  2:09 PM. If you have any questions, ask your nurse or doctor.                CONTINUE taking these medications        Instructions Last Dose Given Next Dose Due   acetaminophen 325 MG tablet  Commonly known as: TYLENOL      Take 2 tablets by mouth Every 4  (Four) Hours As Needed for Mild Pain.       amLODIPine 5 MG tablet  Commonly known as: NORVASC      Take 1 tablet by mouth Daily.       aspirin 81 MG chewable tablet      Chew 1 tablet Daily.       carvedilol 3.125 MG tablet  Commonly known as: COREG      Take 1 tablet by mouth 2 (Two) Times a Day.       cyclobenzaprine 10 MG tablet  Commonly known as: FLEXERIL      Take 1 tablet by mouth 3 (Three) Times a Day As Needed for Muscle Spasms.       FLUoxetine 20 MG capsule  Commonly known as: PROzac      Take 1 capsule by mouth Daily.       hydroCHLOROthiazide 25 MG tablet  Commonly known as: HYDRODIURIL      Take 1 tablet by mouth Daily.       hydroxyurea 500 MG capsule  Commonly known as: HYDREA      TAKE 1 TABLET EVERY OTHER DAY       hydrOXYzine 25 MG tablet  Commonly known as: ATARAX      Take 0.5 tablets by mouth Every 8 (Eight) Hours As Needed for Anxiety.       lisinopril 40 MG tablet  Commonly known as: PRINIVIL,ZESTRIL      Take 1 tablet by mouth Daily.       MAGNESIUM PO      Take 1,000 mg by mouth Daily.       NON FORMULARY      Daily. Lutine eye vitamin       simvastatin 40 MG tablet  Commonly known as: ZOCOR      Take 1 tablet by mouth Every Night.

## 2023-10-30 ENCOUNTER — OFFICE VISIT (OUTPATIENT)
Dept: OBSTETRICS AND GYNECOLOGY | Facility: CLINIC | Age: 78
End: 2023-10-30
Payer: MEDICARE

## 2023-10-30 ENCOUNTER — TELEPHONE (OUTPATIENT)
Dept: ORTHOPEDIC SURGERY | Facility: CLINIC | Age: 78
End: 2023-10-30
Payer: MEDICARE

## 2023-10-30 VITALS
HEART RATE: 85 BPM | BODY MASS INDEX: 29.18 KG/M2 | WEIGHT: 170 LBS | SYSTOLIC BLOOD PRESSURE: 145 MMHG | DIASTOLIC BLOOD PRESSURE: 76 MMHG

## 2023-10-30 DIAGNOSIS — N95.8 GENITOURINARY SYNDROME OF MENOPAUSE: ICD-10-CM

## 2023-10-30 DIAGNOSIS — N90.3 DYSPLASIA OF VULVA: Primary | ICD-10-CM

## 2023-10-30 RX ORDER — ROSUVASTATIN CALCIUM 20 MG/1
20 TABLET, COATED ORAL DAILY
COMMUNITY

## 2023-10-30 RX ORDER — ESTRADIOL 10 UG/1
1 INSERT VAGINAL 2 TIMES WEEKLY
Qty: 24 TABLET | Refills: 3 | Status: SHIPPED | OUTPATIENT
Start: 2023-10-30

## 2023-10-30 NOTE — ASSESSMENT & PLAN NOTE
Pt states she was diagnosed with JOAQUIM in 2016 and had a partial vulvectomy  Since then she has not had any further recurrences  Today patient states she felt a similar lesion 1-2 months prior.  Pt found the initial lesion herself  On exam patient indicates the inferior aspect of the left labia minora.  There is no obvious lesion with skin changes.  However the tissue does appear somewhat thickened.  Given the patient's previous history of vulvar dysplasia have asked the patient to return for a vulvar biopsy

## 2023-10-30 NOTE — TELEPHONE ENCOUNTER
PATIENT WAS CALLED AND INFORMED PRE DR ACUNA THAT SHE COULD HOLD/STOP ASPIRIN FOR 5 DAYS PRIOR TO SURGERY.  PATIENT VOICES UNDERSTANDING.

## 2023-10-31 ENCOUNTER — OFFICE VISIT (OUTPATIENT)
Dept: FAMILY MEDICINE CLINIC | Age: 78
End: 2023-10-31
Payer: MEDICARE

## 2023-10-31 VITALS
HEART RATE: 86 BPM | BODY MASS INDEX: 29.5 KG/M2 | OXYGEN SATURATION: 97 % | SYSTOLIC BLOOD PRESSURE: 131 MMHG | WEIGHT: 172.8 LBS | TEMPERATURE: 97.7 F | HEIGHT: 64 IN | DIASTOLIC BLOOD PRESSURE: 74 MMHG

## 2023-10-31 DIAGNOSIS — F41.8 ANXIETY WITH DEPRESSION: ICD-10-CM

## 2023-10-31 DIAGNOSIS — Z00.00 MEDICARE ANNUAL WELLNESS VISIT, SUBSEQUENT: Primary | ICD-10-CM

## 2023-10-31 DIAGNOSIS — I10 PRIMARY HYPERTENSION: ICD-10-CM

## 2023-10-31 DIAGNOSIS — Z23 ENCOUNTER FOR IMMUNIZATION: ICD-10-CM

## 2023-10-31 PROCEDURE — 1160F RVW MEDS BY RX/DR IN RCRD: CPT | Performed by: NURSE PRACTITIONER

## 2023-10-31 PROCEDURE — G0008 ADMIN INFLUENZA VIRUS VAC: HCPCS | Performed by: NURSE PRACTITIONER

## 2023-10-31 PROCEDURE — 1170F FXNL STATUS ASSESSED: CPT | Performed by: NURSE PRACTITIONER

## 2023-10-31 PROCEDURE — 90662 IIV NO PRSV INCREASED AG IM: CPT | Performed by: NURSE PRACTITIONER

## 2023-10-31 PROCEDURE — 3078F DIAST BP <80 MM HG: CPT | Performed by: NURSE PRACTITIONER

## 2023-10-31 PROCEDURE — 3075F SYST BP GE 130 - 139MM HG: CPT | Performed by: NURSE PRACTITIONER

## 2023-10-31 PROCEDURE — G0439 PPPS, SUBSEQ VISIT: HCPCS | Performed by: NURSE PRACTITIONER

## 2023-10-31 PROCEDURE — 1159F MED LIST DOCD IN RCRD: CPT | Performed by: NURSE PRACTITIONER

## 2023-10-31 RX ORDER — HYDROCHLOROTHIAZIDE 25 MG/1
25 TABLET ORAL DAILY
Qty: 90 TABLET | Refills: 3 | Status: SHIPPED | OUTPATIENT
Start: 2023-10-31

## 2023-10-31 RX ORDER — HYDROXYZINE HYDROCHLORIDE 25 MG/1
12.5 TABLET, FILM COATED ORAL EVERY 8 HOURS PRN
Qty: 90 TABLET | Refills: 1 | Status: SHIPPED | OUTPATIENT
Start: 2023-10-31

## 2023-10-31 NOTE — PROGRESS NOTES
The ABCs of the Annual Wellness Visit  Subsequent Medicare Wellness Visit    Subjective      Pooja Chau is a 78 y.o. female who presents for a Subsequent Medicare Wellness Visit.    The following portions of the patient's history were reviewed and   updated as appropriate: allergies, current medications, past family history, past medical history, past social history, past surgical history, and problem list.    Compared to one year ago, the patient feels her physical   health is better.    Compared to one year ago, the patient feels her mental   health is better.    Dr. Hester is trying to get her BP straightened     Recent Hospitalizations:  This patient has had a [facility] admission record on file within the last 365 days.    Current Medical Providers:  Patient Care Team:  Risa Borrego APRN as PCP - General (Nurse Practitioner)  Tanner Hester MD as Consulting Physician (Cardiology)  Hernan Villagomez MD PhD as Consulting Physician (Ophthalmology)  Hailey Patel MD as Consulting Physician (Urology)  Asia Howard MD (Obstetrics and Gynecology)    Outpatient Medications Prior to Visit   Medication Sig Dispense Refill    acetaminophen (TYLENOL) 325 MG tablet Take 2 tablets by mouth Every 4 (Four) Hours As Needed for Mild Pain.      amLODIPine (NORVASC) 10 MG tablet Take 1 tablet by mouth Daily. 30 tablet 6    aspirin 81 MG chewable tablet Chew 1 tablet Daily.      cyclobenzaprine (FLEXERIL) 10 MG tablet Take 1 tablet by mouth 3 (Three) Times a Day As Needed for Muscle Spasms.      estradiol (Vagifem) 10 MCG tablet vaginal tablet Insert 1 tablet into the vagina 2 (Two) Times a Week. 24 tablet 3    FLUoxetine (PROzac) 20 MG capsule Take 1 capsule by mouth Daily.      hydroxyurea (HYDREA) 500 MG capsule TAKE 1 TABLET EVERY OTHER DAY      lisinopril (PRINIVIL,ZESTRIL) 40 MG tablet Take 1 tablet by mouth Daily.      MAGNESIUM PO Take 1,000 mg by mouth Daily.      NON FORMULARY  Daily. Lutine eye vitamin      rosuvastatin (CRESTOR) 20 MG tablet Take 1 tablet by mouth Daily. Has not started yet      hydroCHLOROthiazide (HYDRODIURIL) 25 MG tablet Take 1 tablet by mouth Daily. 90 tablet 3    hydrOXYzine (ATARAX) 25 MG tablet Take 0.5 tablets by mouth Every 8 (Eight) Hours As Needed for Anxiety. 90 tablet 1     No facility-administered medications prior to visit.       No opioid medication identified on active medication list. I have reviewed chart for other potential  high risk medication/s and harmful drug interactions in the elderly.        Aspirin is on active medication list. Aspirin use is indicated based on review of current medical condition/s. Pros and cons of this therapy have been discussed today. Benefits of this medication outweigh potential harm.  Patient has been encouraged to continue taking this medication.  .      Patient Active Problem List   Diagnosis    Essential hypertension    Anxiety with depression    Hyperlipidemia    Aortic stenosis, severe    Cardiac murmur    Macular degeneration    Colon polyp    Precancerous skin lesion    Hiatal hernia    Chronic pain of left knee    Hand pain    Tear of meniscus of knee    Spondylolisthesis    Spasm of back muscles    Osteoarthritis of right hand    Osteoarthritis of left knee    Gastroesophageal reflux disease    Stress incontinence, female    Dysplasia of vulva    Disorder of acromioclavicular joint    Chronic neck pain    Cervical radiculopathy    Anxiety    Acquired trigger finger    Vaginal prolapse    OAB (overactive bladder)    Obstructive sleep apnea    Myeloproliferative disorder    Iron deficiency anemia secondary to inadequate dietary iron intake    Essential thrombocythemia    Chronic diastolic (congestive) heart failure    Complete heart block    S/P TAVR (transcatheter aortic valve replacement)    Presence of cardiac pacemaker    Degenerative arthritis of knee, bilateral     Advance Care Planning   Advance Care  "Planning     Advance Directive is not on file.  ACP discussion was held with the patient during this visit. Patient has an advance directive (not in EMR), copy requested.     Objective    Vitals:    10/31/23 1320   BP: 131/74   BP Location: Left arm   Patient Position: Sitting   Cuff Size: Adult   Pulse: 86   Temp: 97.7 °F (36.5 °C)   TempSrc: Oral   SpO2: 97%   Weight: 78.4 kg (172 lb 12.8 oz)   Height: 162.6 cm (64\")     Estimated body mass index is 29.66 kg/m² as calculated from the following:    Height as of this encounter: 162.6 cm (64\").    Weight as of this encounter: 78.4 kg (172 lb 12.8 oz).           Does the patient have evidence of cognitive impairment?   No            HEALTH RISK ASSESSMENT    Smoking Status:  Social History     Tobacco Use   Smoking Status Never    Passive exposure: Never   Smokeless Tobacco Never     Alcohol Consumption:  Social History     Substance and Sexual Activity   Alcohol Use Yes    Comment: VERY RARELY/ONCE A MONTH/socially     Fall Risk Screen:    STEADI Fall Risk Assessment was completed, and patient is at LOW risk for falls.Assessment completed on:10/31/2023    Depression Screening:      10/31/2023     1:16 PM   PHQ-2/PHQ-9 Depression Screening   Little Interest or Pleasure in Doing Things 0-->not at all   Feeling Down, Depressed or Hopeless 0-->not at all   PHQ-9: Brief Depression Severity Measure Score 0       Health Habits and Functional and Cognitive Screening:      10/31/2023     1:17 PM   Functional & Cognitive Status   Do you have difficulty preparing food and eating? No   Do you have difficulty bathing yourself, getting dressed or grooming yourself? No   Do you have difficulty using the toilet? No   Do you have difficulty moving around from place to place? No   Do you have trouble with steps or getting out of a bed or a chair? No   Current Diet Unhealthy Diet   Dental Exam Not up to date   Eye Exam Not up to date   Current Exercises Include Other;Walking;House " Cleaning   Do you need help using the phone?  No   Are you deaf or do you have serious difficulty hearing?  No   Do you need help to go to places out of walking distance? No   Do you need help shopping? No   Do you need help preparing meals?  No   Do you need help with housework?  No   Do you need help with laundry? No   Do you need help taking your medications? No   Do you need help managing money? No   Do you ever drive or ride in a car without wearing a seat belt? No   Have you felt unusual stress, anger or loneliness in the last month? No   Who do you live with? Alone   If you need help, do you have trouble finding someone available to you? No   Have you been bothered in the last four weeks by sexual problems? No   Do you have difficulty concentrating, remembering or making decisions? No       Age-appropriate Screening Schedule:  Refer to the list below for future screening recommendations based on patient's age, sex and/or medical conditions. Orders for these recommended tests are listed in the plan section. The patient has been provided with a written plan.    Health Maintenance   Topic Date Due    TDAP/TD VACCINES (1 - Tdap) 10/31/2023 (Originally 3/19/1964)    DXA SCAN  01/31/2024 (Originally 1945)    COVID-19 Vaccine (5 - 2023-24 season) 11/02/2024 (Originally 9/1/2023)    BMI FOLLOWUP  05/24/2024    LIPID PANEL  06/16/2024    ANNUAL WELLNESS VISIT  10/31/2024    HEPATITIS C SCREENING  Completed    INFLUENZA VACCINE  Completed    Pneumococcal Vaccine 65+  Completed    ZOSTER VACCINE  Completed                  CMS Preventative Services Quick Reference  Risk Factors Identified During Encounter:    Immunizations Discussed/Encouraged: Tdap  Dental Screening Recommended    The above risks/problems have been discussed with the patient.  Pertinent information has been shared with the patient in the After Visit Summary.    Diagnoses and all orders for this visit:    1. Medicare annual wellness visit,  subsequent (Primary)  Comments:  annual wellness, healthy diet and exercise as tolerated , labs not due  up to date and plan to repeat in 6 months    2. Primary hypertension  Comments:  Elevated today continue to monitor at home if remains elevated at today's level we will need medication adjustment  Orders:  -     hydroCHLOROthiazide (HYDRODIURIL) 25 MG tablet; Take 1 tablet by mouth Daily.  Dispense: 90 tablet; Refill: 3    3. Anxiety with depression  Comments:  Continue current treatment at next scheduled appointment and as needed  Orders:  -     hydrOXYzine (ATARAX) 25 MG tablet; Take 0.5 tablets by mouth Every 8 (Eight) Hours As Needed for Anxiety.  Dispense: 90 tablet; Refill: 1    4. Encounter for immunization  -     Fluzone High-Dose 65+yrs ([id number])        Follow Up:   Next Medicare Wellness visit to be scheduled in 1 year.      An After Visit Summary and PPPS were made available to the patient.

## 2023-10-31 NOTE — PROGRESS NOTES
"The ABCs of the Annual Wellness Visit  Subsequent Medicare Wellness Visit    Subjective    {Wrapup  Review (Popup)  Advance Care Planning  Labs  CC  Problem List  Visit Diagnosis  Medications  Result Review  Imaging  [facility]  [facility]  [facility]  SnapShot  Encounters  Notes  Media  Procedures :23}  Pooja Chau is a 78 y.o. female who presents for a Subsequent Medicare Wellness Visit.    The following portions of the patient's history were reviewed and   updated as appropriate: {history reviewed:20406::\"allergies\",\"current medications\",\"past family history\",\"past medical history\",\"past social history\",\"past surgical history\",\"problem list\"}.    Compared to one year ago, the patient feels her physical   health is {better worse same:93118}.    Compared to one year ago, the patient feels her mental   health is {better worse same:26029}.    Recent Hospitalizations:  This patient has had a [facility] admission record on file within the last 365 days.    Current Medical Providers:  Patient Care Team:  Risa Borrego APRN as PCP - General (Nurse Practitioner)  Tanner Hester MD as Consulting Physician (Cardiology)  Hernan Villagomez MD PhD as Consulting Physician (Ophthalmology)  Hailey Patel MD as Consulting Physician (Urology)  Asia Howard MD (Obstetrics and Gynecology)    Outpatient Medications Prior to Visit   Medication Sig Dispense Refill    acetaminophen (TYLENOL) 325 MG tablet Take 2 tablets by mouth Every 4 (Four) Hours As Needed for Mild Pain.      amLODIPine (NORVASC) 10 MG tablet Take 1 tablet by mouth Daily. 30 tablet 6    aspirin 81 MG chewable tablet Chew 1 tablet Daily.      cyclobenzaprine (FLEXERIL) 10 MG tablet Take 1 tablet by mouth 3 (Three) Times a Day As Needed for Muscle Spasms.      estradiol (Vagifem) 10 MCG tablet vaginal tablet Insert 1 tablet into the vagina 2 (Two) Times a Week. 24 tablet 3    FLUoxetine (PROzac) " 20 MG capsule Take 1 capsule by mouth Daily.      hydroCHLOROthiazide (HYDRODIURIL) 25 MG tablet Take 1 tablet by mouth Daily. 90 tablet 3    hydroxyurea (HYDREA) 500 MG capsule TAKE 1 TABLET EVERY OTHER DAY      hydrOXYzine (ATARAX) 25 MG tablet Take 0.5 tablets by mouth Every 8 (Eight) Hours As Needed for Anxiety. 90 tablet 1    lisinopril (PRINIVIL,ZESTRIL) 40 MG tablet Take 1 tablet by mouth Daily.      MAGNESIUM PO Take 1,000 mg by mouth Daily.      NON FORMULARY Daily. Lutine eye vitamin      rosuvastatin (CRESTOR) 20 MG tablet Take 1 tablet by mouth Daily. Has not started yet       No facility-administered medications prior to visit.       No opioid medication identified on active medication list. I have reviewed chart for other potential  high risk medication/s and harmful drug interactions in the elderly.        Aspirin is on active medication list. {ASPIRIN ON MEDICATION LIST INDICATED/NOT INDICATED:22300}.      Patient Active Problem List   Diagnosis    Essential hypertension    Anxiety with depression    Hyperlipidemia    Aortic stenosis, severe    Cardiac murmur    Macular degeneration    Colon polyp    Precancerous skin lesion    Hiatal hernia    Chronic pain of left knee    Hand pain    Tear of meniscus of knee    Spondylolisthesis    Spasm of back muscles    Osteoarthritis of right hand    Osteoarthritis of left knee    Gastroesophageal reflux disease    Stress incontinence, female    Dysplasia of vulva    Disorder of acromioclavicular joint    Chronic neck pain    Cervical radiculopathy    Anxiety    Acquired trigger finger    Vaginal prolapse    OAB (overactive bladder)    Obstructive sleep apnea    Myeloproliferative disorder    Iron deficiency anemia secondary to inadequate dietary iron intake    Essential thrombocythemia    Chronic diastolic (congestive) heart failure    Complete heart block    S/P TAVR (transcatheter aortic valve replacement)    Presence of cardiac pacemaker    Degenerative  "arthritis of knee, bilateral     Advance Care Planning   Advance Care Planning     Advance Directive is not on file.  {ACP Discussion, Advance Directive not in EMR:17655}     Objective    Vitals:    10/31/23 1320   BP: 131/74   BP Location: Left arm   Patient Position: Sitting   Cuff Size: Adult   Pulse: 86   Temp: 97.7 °F (36.5 °C)   TempSrc: Oral   SpO2: 97%   Weight: 78.4 kg (172 lb 12.8 oz)   Height: 162.6 cm (64\")     Estimated body mass index is 29.66 kg/m² as calculated from the following:    Height as of this encounter: 162.6 cm (64\").    Weight as of this encounter: 78.4 kg (172 lb 12.8 oz).           Does the patient have evidence of cognitive impairment?   {Yes/No:85525}            HEALTH RISK ASSESSMENT    Smoking Status:  Social History     Tobacco Use   Smoking Status Never    Passive exposure: Never   Smokeless Tobacco Never     Alcohol Consumption:  Social History     Substance and Sexual Activity   Alcohol Use Yes    Comment: VERY RARELY/ONCE A MONTH/socially     Fall Risk Screen:    STEADI Fall Risk Assessment was completed, and patient is at LOW risk for falls.Assessment completed on:10/31/2023    Depression Screening:      10/31/2023     1:16 PM   PHQ-2/PHQ-9 Depression Screening   Little Interest or Pleasure in Doing Things 0-->not at all   Feeling Down, Depressed or Hopeless 0-->not at all   PHQ-9: Brief Depression Severity Measure Score 0       Health Habits and Functional and Cognitive Screening:      10/31/2023     1:17 PM   Functional & Cognitive Status   Do you have difficulty preparing food and eating? No   Do you have difficulty bathing yourself, getting dressed or grooming yourself? No   Do you have difficulty using the toilet? No   Do you have difficulty moving around from place to place? No   Do you have trouble with steps or getting out of a bed or a chair? No   Current Diet Unhealthy Diet   Dental Exam Not up to date   Eye Exam Not up to date   Current Exercises Include " Other;Walking;House Cleaning   Do you need help using the phone?  No   Are you deaf or do you have serious difficulty hearing?  No   Do you need help to go to places out of walking distance? No   Do you need help shopping? No   Do you need help preparing meals?  No   Do you need help with housework?  No   Do you need help with laundry? No   Do you need help taking your medications? No   Do you need help managing money? No   Do you ever drive or ride in a car without wearing a seat belt? No   Have you felt unusual stress, anger or loneliness in the last month? No   Who do you live with? Alone   If you need help, do you have trouble finding someone available to you? No   Have you been bothered in the last four weeks by sexual problems? No   Do you have difficulty concentrating, remembering or making decisions? No       Age-appropriate Screening Schedule:  Refer to the list below for future screening recommendations based on patient's age, sex and/or medical conditions. Orders for these recommended tests are listed in the plan section. The patient has been provided with a written plan.    Health Maintenance   Topic Date Due    DXA SCAN  Never done    TDAP/TD VACCINES (1 - Tdap) Never done    ZOSTER VACCINE (2 of 2) 08/15/2020    COVID-19 Vaccine (5 - 2023-24 season) 11/02/2024 (Originally 9/1/2023)    ANNUAL WELLNESS VISIT  11/21/2023    BMI FOLLOWUP  05/24/2024    LIPID PANEL  06/16/2024    HEPATITIS C SCREENING  Completed    INFLUENZA VACCINE  Completed    Pneumococcal Vaccine 65+  Completed                  CMS Preventative Services Quick Reference  Risk Factors Identified During Encounter:    {Medicare Wellness Risk Factors:43124}    The above risks/problems have been discussed with the patient.  Pertinent information has been shared with the patient in the After Visit Summary.    There are no diagnoses linked to this encounter.    Follow Up:   Next Medicare Wellness visit to be scheduled in 1 year.      An After  Visit Summary and PPPS were made available to the patient.

## 2023-11-02 PROBLEM — N95.8 GENITOURINARY SYNDROME OF MENOPAUSE: Status: ACTIVE | Noted: 2023-11-02

## 2023-11-02 NOTE — ASSESSMENT & PLAN NOTE
Pt with severe vulvovaginal atrophy on exam.  Vulvar architecture on the pt's right side is surgically altered.  Moderate resorption of left labia minora

## 2023-11-03 DIAGNOSIS — Z47.1 AFTERCARE FOLLOWING RIGHT KNEE JOINT REPLACEMENT SURGERY: Primary | ICD-10-CM

## 2023-11-03 DIAGNOSIS — Z96.651 AFTERCARE FOLLOWING RIGHT KNEE JOINT REPLACEMENT SURGERY: Primary | ICD-10-CM

## 2023-11-06 RX ORDER — FLUOXETINE HYDROCHLORIDE 20 MG/1
20 CAPSULE ORAL DAILY
Qty: 90 CAPSULE | Refills: 0 | Status: SHIPPED | OUTPATIENT
Start: 2023-11-06

## 2023-11-28 ENCOUNTER — PRE-ADMISSION TESTING (OUTPATIENT)
Dept: PREADMISSION TESTING | Facility: HOSPITAL | Age: 78
End: 2023-11-28
Payer: MEDICARE

## 2023-11-28 VITALS
WEIGHT: 177.47 LBS | HEART RATE: 81 BPM | HEIGHT: 64 IN | TEMPERATURE: 98.7 F | SYSTOLIC BLOOD PRESSURE: 172 MMHG | BODY MASS INDEX: 30.3 KG/M2 | OXYGEN SATURATION: 96 % | RESPIRATION RATE: 18 BRPM | DIASTOLIC BLOOD PRESSURE: 88 MMHG

## 2023-11-28 DIAGNOSIS — M17.11 PRIMARY OSTEOARTHRITIS OF RIGHT KNEE: ICD-10-CM

## 2023-11-28 LAB
ALBUMIN SERPL-MCNC: 4.2 G/DL (ref 3.5–5.2)
ALBUMIN/GLOB SERPL: 1.4 G/DL
ALP SERPL-CCNC: 162 U/L (ref 39–117)
ALT SERPL W P-5'-P-CCNC: 18 U/L (ref 1–33)
ANION GAP SERPL CALCULATED.3IONS-SCNC: 10.8 MMOL/L (ref 5–15)
AST SERPL-CCNC: 25 U/L (ref 1–32)
BASOPHILS # BLD AUTO: 0.04 10*3/MM3 (ref 0–0.2)
BASOPHILS NFR BLD AUTO: 0.4 % (ref 0–1.5)
BILIRUB SERPL-MCNC: 0.5 MG/DL (ref 0–1.2)
BILIRUB UR QL STRIP: NEGATIVE
BUN SERPL-MCNC: 15 MG/DL (ref 8–23)
BUN/CREAT SERPL: 23.4 (ref 7–25)
CALCIUM SPEC-SCNC: 9.3 MG/DL (ref 8.6–10.5)
CHLORIDE SERPL-SCNC: 97 MMOL/L (ref 98–107)
CLARITY UR: CLEAR
CO2 SERPL-SCNC: 30.2 MMOL/L (ref 22–29)
COLOR UR: YELLOW
CREAT SERPL-MCNC: 0.64 MG/DL (ref 0.57–1)
DEPRECATED RDW RBC AUTO: 50.2 FL (ref 37–54)
EGFRCR SERPLBLD CKD-EPI 2021: 90 ML/MIN/1.73
EOSINOPHIL # BLD AUTO: 0.14 10*3/MM3 (ref 0–0.4)
EOSINOPHIL NFR BLD AUTO: 1.5 % (ref 0.3–6.2)
ERYTHROCYTE [DISTWIDTH] IN BLOOD BY AUTOMATED COUNT: 14.9 % (ref 12.3–15.4)
GLOBULIN UR ELPH-MCNC: 2.9 GM/DL
GLUCOSE SERPL-MCNC: 107 MG/DL (ref 65–99)
GLUCOSE UR STRIP-MCNC: NEGATIVE MG/DL
HBA1C MFR BLD: 5.6 % (ref 4.8–5.6)
HCT VFR BLD AUTO: 40.6 % (ref 34–46.6)
HGB BLD-MCNC: 12.8 G/DL (ref 12–15.9)
HGB UR QL STRIP.AUTO: NEGATIVE
IMM GRANULOCYTES # BLD AUTO: 0.03 10*3/MM3 (ref 0–0.05)
IMM GRANULOCYTES NFR BLD AUTO: 0.3 % (ref 0–0.5)
INR PPP: 0.98 (ref 0.86–1.15)
KETONES UR QL STRIP: NEGATIVE
LEUKOCYTE ESTERASE UR QL STRIP.AUTO: NEGATIVE
LYMPHOCYTES # BLD AUTO: 2.99 10*3/MM3 (ref 0.7–3.1)
LYMPHOCYTES NFR BLD AUTO: 31.6 % (ref 19.6–45.3)
MCH RBC QN AUTO: 29.8 PG (ref 26.6–33)
MCHC RBC AUTO-ENTMCNC: 31.5 G/DL (ref 31.5–35.7)
MCV RBC AUTO: 94.4 FL (ref 79–97)
MONOCYTES # BLD AUTO: 0.82 10*3/MM3 (ref 0.1–0.9)
MONOCYTES NFR BLD AUTO: 8.7 % (ref 5–12)
NEUTROPHILS NFR BLD AUTO: 5.43 10*3/MM3 (ref 1.7–7)
NEUTROPHILS NFR BLD AUTO: 57.5 % (ref 42.7–76)
NITRITE UR QL STRIP: NEGATIVE
NRBC BLD AUTO-RTO: 0 /100 WBC (ref 0–0.2)
PH UR STRIP.AUTO: 7.5 [PH] (ref 5–8)
PLATELET # BLD AUTO: 354 10*3/MM3 (ref 140–450)
PMV BLD AUTO: 12.1 FL (ref 6–12)
POTASSIUM SERPL-SCNC: 3.6 MMOL/L (ref 3.5–5.2)
PROT SERPL-MCNC: 7.1 G/DL (ref 6–8.5)
PROT UR QL STRIP: NEGATIVE
PROTHROMBIN TIME: 13.1 SECONDS (ref 11.8–14.9)
RBC # BLD AUTO: 4.3 10*6/MM3 (ref 3.77–5.28)
SODIUM SERPL-SCNC: 138 MMOL/L (ref 136–145)
SP GR UR STRIP: 1.01 (ref 1–1.03)
UROBILINOGEN UR QL STRIP: NORMAL
WBC NRBC COR # BLD AUTO: 9.45 10*3/MM3 (ref 3.4–10.8)

## 2023-11-28 PROCEDURE — 83036 HEMOGLOBIN GLYCOSYLATED A1C: CPT

## 2023-11-28 PROCEDURE — 36415 COLL VENOUS BLD VENIPUNCTURE: CPT

## 2023-11-28 PROCEDURE — 85025 COMPLETE CBC W/AUTO DIFF WBC: CPT

## 2023-11-28 PROCEDURE — 85610 PROTHROMBIN TIME: CPT

## 2023-11-28 PROCEDURE — 80053 COMPREHEN METABOLIC PANEL: CPT

## 2023-11-28 PROCEDURE — 81003 URINALYSIS AUTO W/O SCOPE: CPT

## 2023-11-28 NOTE — DISCHARGE INSTRUCTIONS
IMPORTANT INSTRUCTIONS - PRE-ADMISSION TESTING  DO NOT EAT OR CHEW anything after midnight the night before your procedure.    You may have CLEAR liquids up to 3 hours prior to ARRIVAL time.   Take the following medications the morning of your procedure with JUST A SIP OF WATER:  Hydroxyurea, amlodipine, Hydroxyzine, Prozac, Rosuvastatin  Last does of Aspirin 12-1-23    DO NOT BRING your medications to the hospital with you, UNLESS something has changed since your PRE-Admission Testing appointment.  Hold all vitamins, supplements, and NSAIDS (Non- steroidal anti-inflammatory meds) for one week prior to surgery (you MAY take Tylenol or Acetaminophen).  Make sure you have a ride home and have someone who will stay with you the day of your procedure after you go home.  If you have any questions, please call your Pre-Admission Testing Nurse, Jacki at [phone].    You will receive a call the day before surgery with an arrival time.    Clear Liquid Diet        Find out when you need to start a clear liquid diet.   Think of “clear liquids” as anything you could read a newspaper through. This includes things like water, broth, sports drinks, or tea WITHOUT any kind of milk or cream.           Once you are told to start a clear liquid diet, only drink these things until 3 hours before arrival to the hospital or when the hospital says to stop. Total volume limitation: 8 oz.       Clear liquids you CAN drink:   Water   Clear broth: beef, chicken, vegetable, or bone broth with nothing in it   Gatorade   Lemonade or Van-aid   Soda   Tea, coffee (NO cream or honey)   Jell-O (without fruit)   Popsicles (without fruit or cream)   Italian ices   Juice without pulp: apple, white, grape   You may use salt, pepper, and sugar    Do NOT drink:   Milk or cream   Soy milk, almond milk, coconut milk, or other non-dairy drinks and   creamers   Milkshakes or smoothies   Tomato juice   Orange juice   Grapefruit juice   Cream soups or  any other than broth         Clear Liquid Diet:  Do NOT eat any solid food.  Do NOT eat or suck on mints or candy.  Do NOT chew gum.  Do NOT drink thick liquids like milk or juice with pulp in it.  Do NOT add milk, cream, or anything like soy milk or almond milk to coffee or tea.

## 2023-11-29 ENCOUNTER — ANESTHESIA EVENT (OUTPATIENT)
Dept: PERIOP | Facility: HOSPITAL | Age: 78
End: 2023-11-29
Payer: MEDICARE

## 2023-12-05 ENCOUNTER — TELEPHONE (OUTPATIENT)
Dept: ORTHOPEDIC SURGERY | Facility: CLINIC | Age: 78
End: 2023-12-05
Payer: MEDICARE

## 2023-12-05 NOTE — TELEPHONE ENCOUNTER
CALLED PATIENT TO SEE IF SHE HAS ARRANGED FOR FAMILY TO HELP WITH [facility].  [facility] IS SET UP FOR HOME HEALTH VISITS.

## 2023-12-07 ENCOUNTER — ANESTHESIA (OUTPATIENT)
Dept: PERIOP | Facility: HOSPITAL | Age: 78
End: 2023-12-07
Payer: MEDICARE

## 2023-12-07 ENCOUNTER — HOSPITAL ENCOUNTER (OUTPATIENT)
Facility: HOSPITAL | Age: 78
Discharge: HOME HEALTH | End: 2023-12-09
Attending: STUDENT IN AN ORGANIZED HEALTH CARE EDUCATION/TRAINING PROGRAM | Admitting: STUDENT IN AN ORGANIZED HEALTH CARE EDUCATION/TRAINING PROGRAM
Payer: MEDICARE

## 2023-12-07 ENCOUNTER — ANESTHESIA EVENT CONVERTED (OUTPATIENT)
Dept: ANESTHESIOLOGY | Facility: HOSPITAL | Age: 78
End: 2023-12-07
Payer: MEDICARE

## 2023-12-07 ENCOUNTER — APPOINTMENT (OUTPATIENT)
Dept: GENERAL RADIOLOGY | Facility: HOSPITAL | Age: 78
End: 2023-12-07
Payer: MEDICARE

## 2023-12-07 DIAGNOSIS — M17.0 PRIMARY OSTEOARTHRITIS OF BOTH KNEES: ICD-10-CM

## 2023-12-07 DIAGNOSIS — Z78.9 DECREASED ACTIVITIES OF DAILY LIVING (ADL): ICD-10-CM

## 2023-12-07 DIAGNOSIS — R26.2 DIFFICULTY IN WALKING: Primary | ICD-10-CM

## 2023-12-07 DIAGNOSIS — M17.11 PRIMARY OSTEOARTHRITIS OF RIGHT KNEE: ICD-10-CM

## 2023-12-07 PROCEDURE — 25010000002 EPINEPHRINE 1 MG/ML SOLUTION: Performed by: STUDENT IN AN ORGANIZED HEALTH CARE EDUCATION/TRAINING PROGRAM

## 2023-12-07 PROCEDURE — A9270 NON-COVERED ITEM OR SERVICE: HCPCS | Performed by: FAMILY MEDICINE

## 2023-12-07 PROCEDURE — A9270 NON-COVERED ITEM OR SERVICE: HCPCS | Performed by: STUDENT IN AN ORGANIZED HEALTH CARE EDUCATION/TRAINING PROGRAM

## 2023-12-07 PROCEDURE — 25010000002 DEXAMETHASONE PER 1 MG: Performed by: NURSE ANESTHETIST, CERTIFIED REGISTERED

## 2023-12-07 PROCEDURE — S0260 H&P FOR SURGERY: HCPCS | Performed by: STUDENT IN AN ORGANIZED HEALTH CARE EDUCATION/TRAINING PROGRAM

## 2023-12-07 PROCEDURE — 20985 CPTR-ASST DIR MS PX: CPT | Performed by: STUDENT IN AN ORGANIZED HEALTH CARE EDUCATION/TRAINING PROGRAM

## 2023-12-07 PROCEDURE — 63710000001 FAMOTIDINE 20 MG TABLET: Performed by: STUDENT IN AN ORGANIZED HEALTH CARE EDUCATION/TRAINING PROGRAM

## 2023-12-07 PROCEDURE — C1776 JOINT DEVICE (IMPLANTABLE): HCPCS | Performed by: STUDENT IN AN ORGANIZED HEALTH CARE EDUCATION/TRAINING PROGRAM

## 2023-12-07 PROCEDURE — 73560 X-RAY EXAM OF KNEE 1 OR 2: CPT

## 2023-12-07 PROCEDURE — 93005 ELECTROCARDIOGRAM TRACING: CPT | Performed by: STUDENT IN AN ORGANIZED HEALTH CARE EDUCATION/TRAINING PROGRAM

## 2023-12-07 PROCEDURE — 63710000001 ROSUVASTATIN 20 MG TABLET: Performed by: FAMILY MEDICINE

## 2023-12-07 PROCEDURE — 25810000003 LACTATED RINGERS PER 1000 ML: Performed by: ANESTHESIOLOGY

## 2023-12-07 PROCEDURE — 63710000001 ACETAMINOPHEN EXTRA STRENGTH 500 MG TABLET: Performed by: ANESTHESIOLOGY

## 2023-12-07 PROCEDURE — 94761 N-INVAS EAR/PLS OXIMETRY MLT: CPT

## 2023-12-07 PROCEDURE — 25010000002 MIDAZOLAM PER 1MG: Performed by: ANESTHESIOLOGY

## 2023-12-07 PROCEDURE — 25010000002 HYDROMORPHONE 1 MG/ML SOLUTION: Performed by: NURSE ANESTHETIST, CERTIFIED REGISTERED

## 2023-12-07 PROCEDURE — 25010000002 ONDANSETRON PER 1 MG: Performed by: NURSE ANESTHETIST, CERTIFIED REGISTERED

## 2023-12-07 PROCEDURE — 63710000001 ACETAMINOPHEN EXTRA STRENGTH 500 MG TABLET: Performed by: STUDENT IN AN ORGANIZED HEALTH CARE EDUCATION/TRAINING PROGRAM

## 2023-12-07 PROCEDURE — 94799 UNLISTED PULMONARY SVC/PX: CPT

## 2023-12-07 PROCEDURE — 99202 OFFICE O/P NEW SF 15 MIN: CPT | Performed by: FAMILY MEDICINE

## 2023-12-07 PROCEDURE — A9270 NON-COVERED ITEM OR SERVICE: HCPCS | Performed by: ANESTHESIOLOGY

## 2023-12-07 PROCEDURE — 25010000002 CEFAZOLIN IN DEXTROSE 2-4 GM/100ML-% SOLUTION: Performed by: STUDENT IN AN ORGANIZED HEALTH CARE EDUCATION/TRAINING PROGRAM

## 2023-12-07 PROCEDURE — C1713 ANCHOR/SCREW BN/BN,TIS/BN: HCPCS | Performed by: STUDENT IN AN ORGANIZED HEALTH CARE EDUCATION/TRAINING PROGRAM

## 2023-12-07 PROCEDURE — 25010000002 ROPIVACAINE PER 1 MG: Performed by: STUDENT IN AN ORGANIZED HEALTH CARE EDUCATION/TRAINING PROGRAM

## 2023-12-07 PROCEDURE — 25010000002 MORPHINE PER 10 MG: Performed by: STUDENT IN AN ORGANIZED HEALTH CARE EDUCATION/TRAINING PROGRAM

## 2023-12-07 PROCEDURE — 25810000003 LACTATED RINGERS PER 1000 ML: Performed by: STUDENT IN AN ORGANIZED HEALTH CARE EDUCATION/TRAINING PROGRAM

## 2023-12-07 PROCEDURE — 27447 TOTAL KNEE ARTHROPLASTY: CPT | Performed by: STUDENT IN AN ORGANIZED HEALTH CARE EDUCATION/TRAINING PROGRAM

## 2023-12-07 PROCEDURE — 63710000001 SENNOSIDES-DOCUSATE 8.6-50 MG TABLET: Performed by: STUDENT IN AN ORGANIZED HEALTH CARE EDUCATION/TRAINING PROGRAM

## 2023-12-07 PROCEDURE — 25010000002 CEFAZOLIN IN DEXTROSE 2000 MG/ 100 ML SOLUTION: Performed by: STUDENT IN AN ORGANIZED HEALTH CARE EDUCATION/TRAINING PROGRAM

## 2023-12-07 PROCEDURE — 25010000002 SUGAMMADEX 200 MG/2ML SOLUTION: Performed by: NURSE ANESTHETIST, CERTIFIED REGISTERED

## 2023-12-07 DEVICE — CMT BONE PALACOS R HI/VISC 1X40: Type: IMPLANTABLE DEVICE | Site: KNEE | Status: FUNCTIONAL

## 2023-12-07 DEVICE — ART/SRF KN PERSONA/VE MC CD 4TO5 11MM RT: Type: IMPLANTABLE DEVICE | Site: KNEE | Status: FUNCTIONAL

## 2023-12-07 DEVICE — STEM TIB/KN PERSONA CMT 5D SZD RT: Type: IMPLANTABLE DEVICE | Site: KNEE | Status: FUNCTIONAL

## 2023-12-07 DEVICE — CAP TOTL KN CMT PRIMARY W/ROSA: Type: IMPLANTABLE DEVICE | Site: KNEE | Status: FUNCTIONAL

## 2023-12-07 DEVICE — SCRW HEX PERSONA FML 2.5X25MM PK/2: Type: IMPLANTABLE DEVICE | Site: KNEE | Status: FUNCTIONAL

## 2023-12-07 DEVICE — IMPLANTABLE DEVICE: Type: IMPLANTABLE DEVICE | Site: KNEE | Status: FUNCTIONAL

## 2023-12-07 DEVICE — COMP FEM/KN PERSONA CR CMT COCR STD SZ5 RT: Type: IMPLANTABLE DEVICE | Site: KNEE | Status: FUNCTIONAL

## 2023-12-07 RX ORDER — MAGNESIUM HYDROXIDE 1200 MG/15ML
LIQUID ORAL AS NEEDED
Status: DISCONTINUED | OUTPATIENT
Start: 2023-12-07 | End: 2023-12-07 | Stop reason: HOSPADM

## 2023-12-07 RX ORDER — ROSUVASTATIN CALCIUM 20 MG/1
20 TABLET, COATED ORAL NIGHTLY
Status: DISCONTINUED | OUTPATIENT
Start: 2023-12-07 | End: 2023-12-09 | Stop reason: HOSPADM

## 2023-12-07 RX ORDER — ASPIRIN 325 MG
325 TABLET ORAL EVERY 12 HOURS SCHEDULED
Status: DISCONTINUED | OUTPATIENT
Start: 2023-12-08 | End: 2023-12-09 | Stop reason: HOSPADM

## 2023-12-07 RX ORDER — CEFAZOLIN SODIUM 2 G/100ML
2 INJECTION, SOLUTION INTRAVENOUS ONCE
Status: COMPLETED | OUTPATIENT
Start: 2023-12-07 | End: 2023-12-07

## 2023-12-07 RX ORDER — CEFAZOLIN SODIUM IN 0.9 % NACL 3 G/100 ML
3 INTRAVENOUS SOLUTION, PIGGYBACK (ML) INTRAVENOUS ONCE
Status: DISCONTINUED | OUTPATIENT
Start: 2023-12-07 | End: 2023-12-07

## 2023-12-07 RX ORDER — ACETAMINOPHEN 325 MG/1
650 TABLET ORAL EVERY 4 HOURS PRN
Status: DISCONTINUED | OUTPATIENT
Start: 2023-12-07 | End: 2023-12-09 | Stop reason: HOSPADM

## 2023-12-07 RX ORDER — ONDANSETRON 2 MG/ML
INJECTION INTRAMUSCULAR; INTRAVENOUS AS NEEDED
Status: DISCONTINUED | OUTPATIENT
Start: 2023-12-07 | End: 2023-12-07 | Stop reason: SURG

## 2023-12-07 RX ORDER — OXYCODONE HYDROCHLORIDE 5 MG/1
5 TABLET ORAL
Status: DISCONTINUED | OUTPATIENT
Start: 2023-12-07 | End: 2023-12-07

## 2023-12-07 RX ORDER — SODIUM CHLORIDE, SODIUM LACTATE, POTASSIUM CHLORIDE, CALCIUM CHLORIDE 600; 310; 30; 20 MG/100ML; MG/100ML; MG/100ML; MG/100ML
100 INJECTION, SOLUTION INTRAVENOUS CONTINUOUS
Status: DISCONTINUED | OUTPATIENT
Start: 2023-12-07 | End: 2023-12-09 | Stop reason: HOSPADM

## 2023-12-07 RX ORDER — ONDANSETRON 2 MG/ML
4 INJECTION INTRAMUSCULAR; INTRAVENOUS EVERY 6 HOURS PRN
Status: DISCONTINUED | OUTPATIENT
Start: 2023-12-07 | End: 2023-12-09 | Stop reason: HOSPADM

## 2023-12-07 RX ORDER — SODIUM CHLORIDE, SODIUM LACTATE, POTASSIUM CHLORIDE, CALCIUM CHLORIDE 600; 310; 30; 20 MG/100ML; MG/100ML; MG/100ML; MG/100ML
9 INJECTION, SOLUTION INTRAVENOUS CONTINUOUS PRN
Status: DISCONTINUED | OUTPATIENT
Start: 2023-12-07 | End: 2023-12-09 | Stop reason: HOSPADM

## 2023-12-07 RX ORDER — FERROUS SULFATE 325(65) MG
325 TABLET ORAL
Status: DISCONTINUED | OUTPATIENT
Start: 2023-12-08 | End: 2023-12-09 | Stop reason: HOSPADM

## 2023-12-07 RX ORDER — FLUOXETINE HYDROCHLORIDE 20 MG/1
20 CAPSULE ORAL DAILY
Status: DISCONTINUED | OUTPATIENT
Start: 2023-12-07 | End: 2023-12-09 | Stop reason: HOSPADM

## 2023-12-07 RX ORDER — PROMETHAZINE HYDROCHLORIDE 25 MG/1
25 TABLET ORAL ONCE AS NEEDED
Status: DISCONTINUED | OUTPATIENT
Start: 2023-12-07 | End: 2023-12-09 | Stop reason: HOSPADM

## 2023-12-07 RX ORDER — BUPIVACAINE HYDROCHLORIDE AND EPINEPHRINE 5; 5 MG/ML; UG/ML
INJECTION, SOLUTION EPIDURAL; INTRACAUDAL; PERINEURAL
Status: COMPLETED | OUTPATIENT
Start: 2023-12-07 | End: 2023-12-07

## 2023-12-07 RX ORDER — LISINOPRIL 20 MG/1
40 TABLET ORAL DAILY
Status: DISCONTINUED | OUTPATIENT
Start: 2023-12-07 | End: 2023-12-09 | Stop reason: HOSPADM

## 2023-12-07 RX ORDER — CEFAZOLIN SODIUM 2 G/100ML
2000 INJECTION, SOLUTION INTRAVENOUS EVERY 8 HOURS
Qty: 200 ML | Refills: 0 | Status: COMPLETED | OUTPATIENT
Start: 2023-12-07 | End: 2023-12-08

## 2023-12-07 RX ORDER — PROMETHAZINE HYDROCHLORIDE 25 MG/1
25 SUPPOSITORY RECTAL ONCE AS NEEDED
Status: DISCONTINUED | OUTPATIENT
Start: 2023-12-07 | End: 2023-12-09 | Stop reason: HOSPADM

## 2023-12-07 RX ORDER — AMLODIPINE BESYLATE 5 MG/1
10 TABLET ORAL DAILY
Status: DISCONTINUED | OUTPATIENT
Start: 2023-12-07 | End: 2023-12-09 | Stop reason: HOSPADM

## 2023-12-07 RX ORDER — OXYCODONE HYDROCHLORIDE 5 MG/1
5 TABLET ORAL EVERY 4 HOURS PRN
Status: DISCONTINUED | OUTPATIENT
Start: 2023-12-07 | End: 2023-12-09

## 2023-12-07 RX ORDER — ACETAMINOPHEN 500 MG
1000 TABLET ORAL EVERY 8 HOURS
Status: DISCONTINUED | OUTPATIENT
Start: 2023-12-07 | End: 2023-12-09 | Stop reason: HOSPADM

## 2023-12-07 RX ORDER — ONDANSETRON 2 MG/ML
4 INJECTION INTRAMUSCULAR; INTRAVENOUS ONCE AS NEEDED
Status: DISCONTINUED | OUTPATIENT
Start: 2023-12-07 | End: 2023-12-09 | Stop reason: HOSPADM

## 2023-12-07 RX ORDER — HYDROCHLOROTHIAZIDE 25 MG/1
25 TABLET ORAL DAILY
Status: DISCONTINUED | OUTPATIENT
Start: 2023-12-07 | End: 2023-12-09 | Stop reason: HOSPADM

## 2023-12-07 RX ORDER — ACETAMINOPHEN 500 MG
1000 TABLET ORAL ONCE
Status: COMPLETED | OUTPATIENT
Start: 2023-12-07 | End: 2023-12-07

## 2023-12-07 RX ORDER — OXYCODONE HYDROCHLORIDE 5 MG/1
10 TABLET ORAL EVERY 4 HOURS PRN
Status: DISCONTINUED | OUTPATIENT
Start: 2023-12-07 | End: 2023-12-09

## 2023-12-07 RX ORDER — MIDAZOLAM HYDROCHLORIDE 2 MG/2ML
2 INJECTION, SOLUTION INTRAMUSCULAR; INTRAVENOUS ONCE
Status: COMPLETED | OUTPATIENT
Start: 2023-12-07 | End: 2023-12-07

## 2023-12-07 RX ORDER — AMOXICILLIN 250 MG
2 CAPSULE ORAL 2 TIMES DAILY
Status: DISCONTINUED | OUTPATIENT
Start: 2023-12-07 | End: 2023-12-09 | Stop reason: HOSPADM

## 2023-12-07 RX ORDER — NALOXONE HCL 0.4 MG/ML
0.4 VIAL (ML) INJECTION
Status: DISCONTINUED | OUTPATIENT
Start: 2023-12-07 | End: 2023-12-09 | Stop reason: HOSPADM

## 2023-12-07 RX ORDER — FAMOTIDINE 20 MG/1
40 TABLET, FILM COATED ORAL DAILY
Status: DISCONTINUED | OUTPATIENT
Start: 2023-12-07 | End: 2023-12-09 | Stop reason: HOSPADM

## 2023-12-07 RX ORDER — SODIUM CHLORIDE 9 MG/ML
40 INJECTION, SOLUTION INTRAVENOUS AS NEEDED
Status: DISCONTINUED | OUTPATIENT
Start: 2023-12-07 | End: 2023-12-07 | Stop reason: HOSPADM

## 2023-12-07 RX ORDER — HYDROXYUREA 500 MG/1
500 CAPSULE ORAL EVERY OTHER DAY
Status: DISCONTINUED | OUTPATIENT
Start: 2023-12-07 | End: 2023-12-09 | Stop reason: HOSPADM

## 2023-12-07 RX ORDER — ONDANSETRON 4 MG/1
4 TABLET, FILM COATED ORAL EVERY 6 HOURS PRN
Status: DISCONTINUED | OUTPATIENT
Start: 2023-12-07 | End: 2023-12-09 | Stop reason: HOSPADM

## 2023-12-07 RX ORDER — SODIUM CHLORIDE 0.9 % (FLUSH) 0.9 %
10 SYRINGE (ML) INJECTION AS NEEDED
Status: DISCONTINUED | OUTPATIENT
Start: 2023-12-07 | End: 2023-12-07 | Stop reason: HOSPADM

## 2023-12-07 RX ORDER — HYDROXYZINE HYDROCHLORIDE 25 MG/1
12.5 TABLET, FILM COATED ORAL EVERY 8 HOURS PRN
Status: DISCONTINUED | OUTPATIENT
Start: 2023-12-07 | End: 2023-12-09 | Stop reason: HOSPADM

## 2023-12-07 RX ORDER — DEXAMETHASONE SODIUM PHOSPHATE 4 MG/ML
INJECTION, SOLUTION INTRA-ARTICULAR; INTRALESIONAL; INTRAMUSCULAR; INTRAVENOUS; SOFT TISSUE AS NEEDED
Status: DISCONTINUED | OUTPATIENT
Start: 2023-12-07 | End: 2023-12-07 | Stop reason: SURG

## 2023-12-07 RX ORDER — CYCLOBENZAPRINE HCL 10 MG
10 TABLET ORAL 3 TIMES DAILY PRN
Status: DISCONTINUED | OUTPATIENT
Start: 2023-12-07 | End: 2023-12-09 | Stop reason: HOSPADM

## 2023-12-07 RX ADMIN — CEFAZOLIN SODIUM 2000 MG: 2 INJECTION, SOLUTION INTRAVENOUS at 23:25

## 2023-12-07 RX ADMIN — SODIUM CHLORIDE, POTASSIUM CHLORIDE, SODIUM LACTATE AND CALCIUM CHLORIDE 9 ML/HR: 600; 310; 30; 20 INJECTION, SOLUTION INTRAVENOUS at 13:19

## 2023-12-07 RX ADMIN — FAMOTIDINE 40 MG: 20 TABLET ORAL at 21:19

## 2023-12-07 RX ADMIN — ONDANSETRON 4 MG: 2 INJECTION INTRAMUSCULAR; INTRAVENOUS at 16:46

## 2023-12-07 RX ADMIN — ROSUVASTATIN 20 MG: 20 TABLET, FILM COATED ORAL at 21:19

## 2023-12-07 RX ADMIN — ACETAMINOPHEN 1000 MG: 500 TABLET ORAL at 09:42

## 2023-12-07 RX ADMIN — SODIUM CHLORIDE, POTASSIUM CHLORIDE, SODIUM LACTATE AND CALCIUM CHLORIDE 100 ML/HR: 600; 310; 30; 20 INJECTION, SOLUTION INTRAVENOUS at 21:20

## 2023-12-07 RX ADMIN — HYDROMORPHONE HYDROCHLORIDE 0.5 MG: 1 INJECTION, SOLUTION INTRAMUSCULAR; INTRAVENOUS; SUBCUTANEOUS at 16:58

## 2023-12-07 RX ADMIN — SODIUM CHLORIDE, POTASSIUM CHLORIDE, SODIUM LACTATE AND CALCIUM CHLORIDE 100 ML/HR: 600; 310; 30; 20 INJECTION, SOLUTION INTRAVENOUS at 23:26

## 2023-12-07 RX ADMIN — SUGAMMADEX 200 MG: 100 INJECTION, SOLUTION INTRAVENOUS at 16:38

## 2023-12-07 RX ADMIN — MIDAZOLAM HYDROCHLORIDE 2 MG: 1 INJECTION, SOLUTION INTRAMUSCULAR; INTRAVENOUS at 13:19

## 2023-12-07 RX ADMIN — ACETAMINOPHEN 1000 MG: 500 TABLET ORAL at 21:19

## 2023-12-07 RX ADMIN — SODIUM CHLORIDE, POTASSIUM CHLORIDE, SODIUM LACTATE AND CALCIUM CHLORIDE 9 ML/HR: 600; 310; 30; 20 INJECTION, SOLUTION INTRAVENOUS at 08:54

## 2023-12-07 RX ADMIN — HYDROMORPHONE HYDROCHLORIDE 0.5 MG: 1 INJECTION, SOLUTION INTRAMUSCULAR; INTRAVENOUS; SUBCUTANEOUS at 17:08

## 2023-12-07 RX ADMIN — BUPIVACAINE HYDROCHLORIDE AND EPINEPHRINE BITARTRATE 30 ML: 5; .005 INJECTION, SOLUTION EPIDURAL; INTRACAUDAL; PERINEURAL at 13:46

## 2023-12-07 RX ADMIN — DOCUSATE SODIUM 50MG AND SENNOSIDES 8.6MG 2 TABLET: 8.6; 5 TABLET, FILM COATED ORAL at 21:19

## 2023-12-07 RX ADMIN — SODIUM CHLORIDE, POTASSIUM CHLORIDE, SODIUM LACTATE AND CALCIUM CHLORIDE: 600; 310; 30; 20 INJECTION, SOLUTION INTRAVENOUS at 15:48

## 2023-12-07 RX ADMIN — DEXAMETHASONE SODIUM PHOSPHATE 4 MG: 4 INJECTION, SOLUTION INTRAMUSCULAR; INTRAVENOUS at 16:46

## 2023-12-07 RX ADMIN — CEFAZOLIN SODIUM 2 G: 2 INJECTION, SOLUTION INTRAVENOUS at 14:53

## 2023-12-07 NOTE — H&P
[facility]   HISTORY AND PHYSICAL    Patient Name: Pooja Chau  : 1945  MRN: [id number]  Primary Care Physician:  Risa Borrego APRN  Date of admission: 2023    Subjective   Subjective     Chief Complaint: Right knee pain    History of Present Illness    Pooja is a 78-year-old female with a history of right knee arthritis.  She failed nonoperative management and also had 2 prior knee arthroscopy procedures.  We discussed treatment options.  We discussed the risk, benefits, indications, and alternatives to right total knee arthroplasty.  She elected to proceed with surgery and consent was obtained.    Review of Systems     14 point review of systems negative except as noted above in the HPI    Personal History     Past Medical History:   Diagnosis Date    Aortic stenosis     Follows with Dr. Arrieta    Bladder incontinence     Axonics sacral neuromodulation placement     Claustrophobia     MRI    CML (chronic myelocytic leukemia)     Complete heart block     H/O Pneumonia     Heart disease     HPV (human papilloma virus) infection     Hypertension     Macular degeneration of left eye     Pacemaker 2023    Medtronic, follows with Dr. Arrieta    Primary osteoarthritis of right knee 10/04/2023    Right knee pain     Seasonal depression        Past Surgical History:   Procedure Laterality Date    AORTIC VALVE REPAIR/REPLACEMENT N/A 2023    Procedure: TRANSFEMORAL TRANSCATHETER AORTIC VALVE REPLACEMENT;  Surgeon: Dalton Neumann MD;  Location: [facility];  Service: Cardiothoracic;  Laterality: N/A;    AORTIC VALVE REPAIR/REPLACEMENT N/A 2023    Procedure: Transfemoral Transcatheter Aortic Valve Replacement;  Surgeon: Tanner Hester MD;  Location: [facility];  Service: Cardiovascular;  Laterality: N/A;    CARDIAC CATHETERIZATION N/A 2023    Procedure: Left Heart Cath;  Surgeon: Tanner Hester MD;  Location: [facility] CATH INVASIVE LOCATION;   Service: Cardiology;  Laterality: N/A;  TAVR work up    CARDIAC ELECTROPHYSIOLOGY PROCEDURE N/A 06/22/2023    Procedure: Pacemaker DC new medtronic;  Surgeon: Viet Arechiga MD;  Location: [facility] INVASIVE LOCATION;  Service: Cardiovascular;  Laterality: N/A;    CARPAL TUNNEL RELEASE Bilateral     CATARACT EXTRACTION      CERVICAL DISCECTOMY ANTERIOR  10/2018    c4-c7 with fusion    COLONOSCOPY      HYSTERECTOMY      at age 39   with BLSO    KNEE SURGERY Bilateral     2x/s in right   1 x in left    arthroscopy      SHOULDER SURGERY Left     TOE SURGERY Left     TRIGGER FINGER RELEASE      multiple    VULVA BIOPSY         Family History: family history includes Anemia in an other family member; Arthritis in her sister and another family member; Cervical cancer in her sister; Coronary artery disease in her father; Depression in her brother and sister; Diabetes in her maternal grandmother and mother; Fibromyalgia in an other family member; Stroke in her father. Otherwise pertinent FHx was reviewed and not pertinent to current issue.    Social History:  reports that she has never smoked. She has never been exposed to tobacco smoke. She has never used smokeless tobacco. She reports that she does not currently use alcohol. She reports that she does not use drugs.    Home Medications:  FLUoxetine, Magnesium, NON FORMULARY, acetaminophen, amLODIPine, aspirin, cyclobenzaprine, estradiol, hydrOXYzine, hydroCHLOROthiazide, hydroxyurea, lisinopril, and rosuvastatin    Allergies:  Allergies   Allergen Reactions    Nsaids Swelling    Codeine Nausea Only       Objective    Objective     Vitals:   Temp:  [98 °F (36.7 °C)] 98 °F (36.7 °C)  Heart Rate:  [91] 91  Resp:  [18] 18  BP: (152)/(60) 152/60    Physical Exam    General: Alert, no acute distress  Cardiac: Intact peripheral pulses  Pulmonary: Nonlabored respirations  Right lower extremity:  Positive EHL, FHL, GS and TA. Sensation intact to all 5 nerves of the foot.  Positive pulses. ROM -5 to 110 degrees. Mild effusion. Stable to varus/valgus stress. Stable to anterior/posterior drawer. Negative Lachman's. Negative Fredo's.  Full hip motion without pain.  Well-healed arthroscopy incisions.      Result Review    Result Review:  I have personally reviewed the results from the time of this admission to 12/7/2023 11:12 EST and agree with these findings:  []  Laboratory list / accordion  []  Microbiology  []  Radiology  []  EKG/Telemetry   []  Cardiology/Vascular   []  Pathology  []  Old records  []  Other:        Assessment & Plan   Assessment / Plan     Brief Patient Summary:  Pooja Chau is a 78 y.o. female with right knee arthritis who presents today for right total knee arthroplasty    Active Hospital Problems:  Active Hospital Problems    Diagnosis     **Degenerative arthritis of knee, bilateral     Primary osteoarthritis of right knee      Plan:     We discussed the risk, benefits, indications, and alternatives of right total knee arthroplasty.  We discussed risks including bleeding, infection, damage to nerves and blood vessels, hardware complications, fracture, loosening, instability, stiffness, persistent pain, anesthesia risk including mortality, DVT/PE, and need for additional procedures.  She elected to proceed with surgical management.      DVT prophylaxis:  Mechanical DVT prophylaxis orders are present.        Missael Parmar MD

## 2023-12-07 NOTE — CONSULTS
[facility] HISTORY AND PHYSICAL  Date: 2023   Patient Name: Pooja Chau  : 1945  MRN: [id number]  Primary Care Physician:  Risa Borrego APRN  Date of admission: 2023  Referring physician: Dr. Missael Parmar  Consulting physician: Dr. Ruben Baptiste  Reason for consult: Management of chronic diseases    Subjective   Subjective       HPI:    Pooja Chau is a 78 y.o. female admitted to the hospital following right knee arthroplasty performed by Dr. Parmar secondary to right knee osteoarthritis.  Patient reportedly failed nonoperative management, has had 2 prior knee arthroscopies.  Patient underwent uncomplicated and successful right knee arthroplasty without any immediate complications.  At the time of examination, patient was complaining of some postoperative pain in the right knee.  Otherwise, patient denies any acute complaints.      Personal History     Past Medical History:  Past Medical History:   Diagnosis Date    Aortic stenosis     Follows with Dr. Arrieta    Bladder incontinence     Axonics sacral neuromodulation placement     Claustrophobia     MRI    CML (chronic myelocytic leukemia)     Complete heart block     H/O Pneumonia     Heart disease     HPV (human papilloma virus) infection     Hypertension     Macular degeneration of left eye     Pacemaker 2023    Medtronic, follows with Dr. Arrieta    Primary osteoarthritis of right knee 10/04/2023    Right knee pain     Seasonal depression          Past Surgical History:  Past Surgical History:   Procedure Laterality Date    AORTIC VALVE REPAIR/REPLACEMENT N/A 2023    Procedure: TRANSFEMORAL TRANSCATHETER AORTIC VALVE REPLACEMENT;  Surgeon: Dalton Neumann MD;  Location: [facility];  Service: Cardiothoracic;  Laterality: N/A;    AORTIC VALVE REPAIR/REPLACEMENT N/A 2023    Procedure: Transfemoral Transcatheter Aortic Valve Replacement;  Surgeon: Tanner Hester MD;   Location:  [facility];  Service: Cardiovascular;  Laterality: N/A;    CARDIAC CATHETERIZATION N/A 06/02/2023    Procedure: Left Heart Cath;  Surgeon: Tanner Hester MD;  Location:  [facility] CATH INVASIVE LOCATION;  Service: Cardiology;  Laterality: N/A;  TAVR work up    CARDIAC ELECTROPHYSIOLOGY PROCEDURE N/A 06/22/2023    Procedure: Pacemaker DC new medtronic;  Surgeon: Viet Arechiga MD;  Location:  [facility] CATH INVASIVE LOCATION;  Service: Cardiovascular;  Laterality: N/A;    CARPAL TUNNEL RELEASE Bilateral     CATARACT EXTRACTION      CERVICAL DISCECTOMY ANTERIOR  10/2018    c4-c7 with fusion    COLONOSCOPY      HYSTERECTOMY      at age 39   with BLSO    KNEE SURGERY Bilateral     2x/s in right   1 x in left    arthroscopy      SHOULDER SURGERY Left     TOE SURGERY Left     TRIGGER FINGER RELEASE      multiple    VULVA BIOPSY           Family History:   Family History   Problem Relation Age of Onset    Diabetes Mother         complications from DM    Coronary artery disease Father     Stroke Father     Depression Sister     Cervical cancer Sister     Arthritis Sister     Depression Brother     Diabetes Maternal Grandmother     Arthritis Other     Anemia Other     Fibromyalgia Other     Breast cancer Neg Hx     Ovarian cancer Neg Hx     Colon cancer Neg Hx     Prostate cancer Neg Hx     Malig Hyperthermia Neg Hx          Social History:   Social History     Socioeconomic History    Marital status:     Number of children: 2   Tobacco Use    Smoking status: Never     Passive exposure: Never    Smokeless tobacco: Never   Vaping Use    Vaping Use: Never used   Substance and Sexual Activity    Alcohol use: Not Currently     Comment: non in 2 years    Drug use: Never    Sexual activity: Defer     Birth control/protection: Hysterectomy         Home Medications:  FLUoxetine, Magnesium, NON FORMULARY, acetaminophen, amLODIPine, aspirin, cyclobenzaprine, estradiol, hydrOXYzine, hydroCHLOROthiazide, hydroxyurea,  lisinopril, and rosuvastatin    Allergies:  Allergies   Allergen Reactions    Nsaids Swelling    Codeine Nausea Only       Review of Systems   All systems were reviewed and negative except for: Right knee pain    Objective   Objective     Vitals:   Temp:  [97 °F (36.1 °C)-99.2 °F (37.3 °C)] 97 °F (36.1 °C)  Heart Rate:  [64-92] 88  Resp:  [12-19] 14  BP: ()/(45-88) 103/54  Flow (L/min):  [2-4] 2  FiO2 (%):  [53 %-99 %] 99 %    Physical Exam    Constitutional: Awake, alert, no acute distress, sitting upright in chair eating dinner   Eyes: Pupils equal, sclerae anicteric, no conjunctival injection   HENT: NCAT, mucous membranes moist   Neck: Supple, no thyromegaly, no lymphadenopathy, trachea midline   Respiratory: Clear to auscultation bilaterally, nonlabored respirations    Cardiovascular: RRR, no murmurs, rubs, or gallops, palpable pedal pulses bilaterally   Gastrointestinal: Positive bowel sounds, soft, nontender, nondistended   Musculoskeletal: No bilateral ankle edema, no clubbing or cyanosis to extremities.  Good pedal pulses bilaterally.  Right knee Ace wrap in place clean/dry/intact.   Psychiatric: Appropriate affect, cooperative   Neurologic: Oriented x 3, strength symmetric in all extremities, Cranial Nerves grossly intact to confrontation, speech clear   Skin: No rashes     Result Review    Result Review:  I have personally reviewed the results from the time of this admission to 12/7/2023 18:31 EST and agree with these findings:  [x]  Laboratory  []  Microbiology  [x]  Radiology  [x]  EKG/Telemetry   []  Cardiology/Vascular   []  Pathology  [x]  Old records  []  Other:      Assessment & Plan   Assessment / Plan     Assessment/Plan:   Right knee osteoarthritis status post right knee arthroplasty postop day 0-management per orthopedic surgery.  Pain control managed per orthopedic surgery as well.  PT/OT per orthopedic surgery recommendations.  Hypertension-Home meds  Depression-Home meds  Anxiety-Home  meds  Lipitor-Home meds  Chronic pain syndrome-Home meds  history of chronic myelocytic leukemia-Home meds    Appreciate the consultation in the care of this patient, if any questions or concerns please do not hesitate to contact hospital services.  Will continue to follow along.    DVT prophylaxis:  Mechanical DVT prophylaxis orders are present.    CODE STATUS: Full code      Electronically signed by Ruben Baptiste DO, 12/07/23, 6:31 PM EST.

## 2023-12-07 NOTE — ANESTHESIA POSTPROCEDURE EVALUATION
Patient: Pooja Chau    Procedure Summary       Date: 12/07/23 Room / Location: [facility] OR 03 / [facility] MAIN OR    Anesthesia Start: 1443 Anesthesia Stop: 1651    Procedure: TOTAL KNEE ARTHROPLASTY WITH TAMMY ROBOT (Right: Knee) Diagnosis:       Primary osteoarthritis of right knee      (Primary osteoarthritis of right knee [M17.11])    Surgeons: Missael Parmar MD Provider: Jayjay Adams MD    Anesthesia Type: general with block ASA Status: 4            Anesthesia Type: general with block    Vitals  Vitals Value Taken Time   /54 12/07/23 1731   Temp 36.1 °C (97 °F) 12/07/23 1730   Pulse 100 12/07/23 1750   Resp 14 12/07/23 1730   SpO2 93 % 12/07/23 1750   Vitals shown include unfiled device data.        Post Anesthesia Care and Evaluation    Patient location during evaluation: bedside  Patient participation: complete - patient participated  Level of consciousness: awake  Pain management: adequate    Airway patency: patent  PONV Status: none  Cardiovascular status: acceptable  Respiratory status: acceptable  Hydration status: acceptable    Comments: An Anesthesiologist personally participated in the most demanding procedures (including induction and emergence if applicable) in the anesthesia plan, monitored the course of anesthesia administration at frequent intervals and remained physically present and available for immediate diagnosis and treatment of emergencies.

## 2023-12-07 NOTE — PLAN OF CARE
Goal Outcome Evaluation:   Status post total knee replacement today. Pain well controlled at this time. Oxygen in use post op. Vital signs stable. Dressing dry and intact. Ambulated from the stretcher to the chair.

## 2023-12-07 NOTE — OP NOTE
TOTAL KNEE ARTHROPLASTY WITH TAMMY ROBOT  Procedure Report    Patient Name:  Pooja Chau  YOB: 1945    Date of Surgery:  12/7/2023     Indications: Pooja is a 78-year-old female with advanced right knee osteoarthritis.  She failed nonoperative management.  We discussed the risk, benefits, indications, alternatives to right total knee arthroplasty.  She elected to proceed with surgery and consent was obtained.    Pre-op Diagnosis:   Primary osteoarthritis of right knee [M17.11]       Post-Op Diagnosis Codes:     * Primary osteoarthritis of right knee [M17.11]    Procedure/CPT® Codes:      Procedure(s):  TOTAL KNEE ARTHROPLASTY WITH TAMMY ROBOT    Staff:  Surgeon(s):  Missael Parmar MD    Assistant: Mona Flores Sarah, RN    Surgical Approach: Knee Medial Parapatellar        Anesthesia: General    Estimated Blood Loss: 50 mL    Implants:    Implant Name Type Inv. Item Serial No.  Lot No. LRB No. Used Action   SCRW HEX PERSONA FML 2.5X25MM PK/2 - [id number] Implant SCRW HEX PERSONA FML 2.5X25MM PK/2  [employer] [id number] Right 1 Implanted   CMT BONE PALACOS R HI/VISC 1X40 - [id number] Implant CMT BONE PALACOS R HI/VISC 1X40  [facility] [id number] Right 2 Implanted   STEM TIB/KN PERSONA CMT 5D SZD RT - [id number] Implant STEM TIB/KN PERSONA CMT 5D SZD RT  [employer] [id number] Right 1 Implanted   COMP FEM/KN PERSONA CR CMT COCR STD SZ5 RT - [id number] Implant COMP FEM/KN PERSONA CR CMT COCR STD SZ5 RT  [employer] [id number] Right 1 Implanted   PAT KN PERSONA ALLPOLY CMT 8X29MM - [id number] Implant PAT KN PERSONA ALLPOLY CMT 8X29MM  [employer] [id number] Right 1 Implanted   ART/SRF KN PERSONA/VE MC CD 4TO5 11MM RT - [id number] Implant ART/SRF KN PERSONA/VE MC CD 4TO5 11MM RT  [employer] [id number] Right 1 Implanted       Specimen:          None        Findings: Right knee osteoarthritis    Complications: None    Description of Procedure: The patient was met in the  preoperative holding area and the operative extremity was marked.  The patient received a preoperative peripheral nerve block.  The patient was transported to the operating room and laid supine on the operating table.  General anesthesia was induced without complication.  2 g of preoperative Ancef were administered.  An upper thigh tourniquet was applied.  The right lower extremity was then prepped and draped in the usual sterile fashion.  A timeout was taken to ensure the appropriate patient, procedure, and procedural site.  All were in agreement.  The right lower extremity was exsanguinated and tourniquet was inflated to 300 mmHg.  A legholding device was used for assistance during the case.  A standard midline longitudinal incision was created for an anterior approach to the knee.  Sharp dissection was carried down through the skin and subcutaneous tissues.  The extensor mechanism and retinacular tissues were identified.  A medial parapatellar arthrotomy was created in standard fashion.  A medial release off the proximal tibia was performed and the anterior horn of the medial meniscus was resected.   The fat pad was sharply excised.  The patella was everted and the leg was brought into 90 degrees of flexion.  I  inserted 2 threaded pins for the femoral tracker through the primary incision.  Distally I made 2 stab incisions distal to the tibial tubercle and 2 threaded pins were placed here as well.  The femoral and tibial trackers were then applied.   I then proceeded to navigate the femur and tibia and performed the knee state evaluation in standard fashion using the Solutionreach robotic assistance device.  I planned my femoral and tibial cuts.  The cutting arm was then brought onto the field with the knee in 90 degrees of flexion.  The distal femoral cutting guide was pinned in place and the cut was completed without complication.  The bony resection was removed.  I then drilled the  holes for the 4-in-1 cutting  block and move the guide arm to the tibia.  This was pinned in place.  Retractors were repositioned and the tibial cut was created without complication.  The 4-in-1 cutting block was then impacted onto the distal femur.  I was satisfied with the rotation of the cutting block relative to the epicondylar axis.  I then performed the anterior, posterior, and chamfer cuts without complication while protecting the collateral ligaments.  Bony resections were removed.  A lamina  was inserted.  The remaining portions of the medial and lateral menisci were sharply excised using the Bovie.    Posterior osteophytes were removed with a curved osteotome.  The PCL retractor was again inserted and the proximal tibia was exposed.  I sized the proximal tibia at D.  I then inserted my size 5 right femoral component trial and size D right tibial component trial with a size 10 trial insert.  The knee was brought into extension.  With these trials in place the knee could achieve full extension and was stable to varus and valgus stress.  The patella tracked centrally with no liftoff of the tibial tray in deep flexion.  The knee was brought back into extension.  The patella was everted.  The patella was measured at 21 mm thickness.  I planned a 7 mm resection.  I utilized the patellar cutting guide and performed a 7 mm cut without complication.  I sized the patella at a 29 and drilled for a symmetric cemented patellar button.  The patella trial was inserted.  The knee was again taken through range of motion.  Tracking was excellent and the knee was stable to varus and valgus stress throughout motion.  I was satisfied with these trial implants.  The rotation of the tibial tray was marked.  The patellar and tibial trials were removed.  The femoral lug holes were drilled in anticipation of cemented components.  The femoral trial was removed.  The PCL retractor was inserted and the knee was taken into hyperflexion again.  The proximal  tibia was then punched in standard fashion in line with the previously marked rotation.  This was done without complication.  The knee was then thoroughly irrigated with normal saline.  A size 5 cemented cruciate retaining femoral component and size D tibial tray were opened.  The cement was mixed on the back table.  I then cemented the tibial and femoral components in standard fashion removing excess cement during the process.  The knee was brought into extension with an 11 mm spacer in place and axial traction was applied.  The patella was everted.  The patella was cemented in standard fashion without complication.  Excess cement was removed.  The knee was thoroughly irrigated with normal saline followed by irrisept solution.  Periarticular local was injected into the soft tissues.  The cement was allowed to fully dry.  The tibial and femoral trackers were removed. Once dry, the knee was taken through a range of motion again.  The knee achieved full extension, patellar tracking was excellent, and the knee was stable to varus and valgus stress throughout motion.  The knee was brought into flexion and the size 11 spacer was removed.  The size 11 MC polyinsert was opened.  The tibial tray was cleared.  The polyinsert was then impacted without complication and was fully seated.  The knee was again taken through range of motion with the final implants in place.  Full extension could be achieved.  Tracking was excellent.  The knee was stable to varus and valgus stress.  The knee was again irrigated with normal saline solution.  The extensor mechanism and retinacular tissues were closed with #1 Ethibond in interrupted figure-of-eight fashion.  The tourniquet was released and adequate hemostasis was obtained.  Subcutaneous tissues were closed with 0 Vicryl and 2-0 Vicryl.  Skin was closed with skin staples.  Tibial pin sites were closed with nylon sutures. The wound was dressed with an Aquacel dressing, soft roll, ABD,  and an Ace wrap from the foot to the proximal thigh.  The patient awoke from anesthesia without complication and was transferred to the recovery room in stable condition.  All surgical counts were correct.  The patient had palpable pedal pulses prior to leaving the operating room.    Assistant: Mona Flores Sarah, GASTON  was responsible for performing the following activities: Retraction, Suction, Irrigation, Closing, and Placing Dressing and their skilled assistance was necessary for the success of this case.    Missael Parmar MD     Date: 12/7/2023  Time: 16:53 EST

## 2023-12-07 NOTE — ANESTHESIA PROCEDURE NOTES
Peripheral Block      Patient reassessed immediately prior to procedure    Patient location during procedure: pre-op  Start time: 12/7/2023 1:46 PM  Stop time: 12/7/2023 1:54 PM  Reason for block: at surgeon's request and post-op pain management  Performed by  Anesthesiologist: Jayjay Adams MD  Preanesthetic Checklist  Completed: patient identified, IV checked, site marked, risks and benefits discussed, surgical consent, monitors and equipment checked, pre-op evaluation and timeout performed  Prep:  Pt Position: supine  Sterile barriers:alcohol skin prep, partial drape, cap, washed/disinfected hands, mask and gloves  Prep: ChloraPrep  Patient monitoring: blood pressure monitoring, continuous pulse oximetry and EKG  Procedure    Sedation: yes  Performed under: local infiltration  Guidance:ultrasound guided and nerve stimulator    ULTRASOUND INTERPRETATION.  Using ultrasound guidance a 20 G gauge needle was placed in close proximity to the nerve, at which point, under ultrasound guidance anesthetic was injected in the area of the nerve and spread of the anesthesia was seen on ultrasound in close proximity thereto.  There were no abnormalities seen on ultrasound; a digital image was taken; and the patient tolerated the procedure with no complications. Images:still images obtained, printed/placed on chart    Laterality:right  Block Type:adductor canal block  Injection Technique:single-shot  Needle Type:echogenic  Needle Gauge:20 G (4in)  Resistance on Injection: none    Medications Used: bupivacaine-EPINEPHrine PF (MARCAINE w/EPI) 0.5% -1:[id number] injection - Injection   30 mL - 12/7/2023 1:46:00 PM      Post Assessment  Injection Assessment: negative aspiration for heme, no paresthesia on injection and incremental injection  Patient Tolerance:comfortable throughout block  Complications:no  Additional Notes  The block or continuous infusion is requested by the referring physician for management of postoperative pain,  or pain related to a procedure. Ultrasound guidance (deemed medically necessary). Painless injection, pt was awake and conversant during the procedure without complications. Needle and surrounding structures visualized throughout procedure. No adverse reactions or complications seen during this period. Post-procedure image showed no signs of complication, and anatomy was consistent with an uncomplicated nerve blockade.

## 2023-12-07 NOTE — ANESTHESIA PREPROCEDURE EVALUATION
Anesthesia Evaluation     Patient summary reviewed and Nursing notes reviewed   no history of anesthetic complications:   NPO Solid Status: > 8 hours  NPO Liquid Status: > 2 hours           Airway   Mallampati: II  TM distance: >3 FB  Neck ROM: full  No difficulty expected  Dental    (+) partials        Pulmonary - normal exam    breath sounds clear to auscultation  (+) ,sleep apnea  Cardiovascular - normal exam  Exercise tolerance: good (4-7 METS)    ECG reviewed  Rhythm: regular  Rate: normal    (+) pacemaker pacemaker, hypertension, valvular problems/murmurs (s./p TAVR) AS, dysrhythmias (complete heart block, has pacemaker), CHF Diastolic >=55%, hyperlipidemia    ROS comment: AV paced    Neuro/Psych- negative ROS  GI/Hepatic/Renal/Endo    (+) hiatal hernia    Musculoskeletal     Abdominal    Substance History      OB/GYN          Other   arthritis,   history of cancer (CML)    ROS/Med Hx Other: >4METS, MOD. ACTIVE.HX SEV DEG. AS,HTN,HLD,LES,MYELOPROL.  NEOPLASM.6/29/23     TAVR,POST PROC ECHO NORMAL LV FX, CATH NORMAL CORONARIES,RBBB-COMPLETE HB,     DUAL CHAMBER PACEMAKER,    . CARDS CLEARANCE. KT               Anesthesia Plan    ASA 4     general with block     (Patient understands anesthesia not responsible for dental damage. Regional anesthesia options discussed with patient. Pt accepts regional block.)  intravenous induction     Anesthetic plan, risks, benefits, and alternatives have been provided, discussed and informed consent has been obtained with: patient.    Plan discussed with CRNA.    CODE STATUS:

## 2023-12-08 LAB
ANION GAP SERPL CALCULATED.3IONS-SCNC: 13 MMOL/L (ref 5–15)
BILIRUB UR QL STRIP: NEGATIVE
BUN SERPL-MCNC: 15 MG/DL (ref 8–23)
BUN/CREAT SERPL: 16.3 (ref 7–25)
CALCIUM SPEC-SCNC: 8.8 MG/DL (ref 8.6–10.5)
CHLORIDE SERPL-SCNC: 98 MMOL/L (ref 98–107)
CLARITY UR: CLEAR
CO2 SERPL-SCNC: 27 MMOL/L (ref 22–29)
COLOR UR: YELLOW
CREAT SERPL-MCNC: 0.92 MG/DL (ref 0.57–1)
EGFRCR SERPLBLD CKD-EPI 2021: 63.5 ML/MIN/1.73
GLUCOSE SERPL-MCNC: 150 MG/DL (ref 65–99)
GLUCOSE UR STRIP-MCNC: NEGATIVE MG/DL
HCT VFR BLD AUTO: 36.6 % (ref 34–46.6)
HGB BLD-MCNC: 11.5 G/DL (ref 12–15.9)
HGB UR QL STRIP.AUTO: NEGATIVE
KETONES UR QL STRIP: NEGATIVE
LEUKOCYTE ESTERASE UR QL STRIP.AUTO: NEGATIVE
NITRITE UR QL STRIP: NEGATIVE
PH UR STRIP.AUTO: 5.5 [PH] (ref 5–8)
POTASSIUM SERPL-SCNC: 3.6 MMOL/L (ref 3.5–5.2)
PROT UR QL STRIP: NEGATIVE
SODIUM SERPL-SCNC: 138 MMOL/L (ref 136–145)
SP GR UR STRIP: 1.02 (ref 1–1.03)
UROBILINOGEN UR QL STRIP: NORMAL

## 2023-12-08 PROCEDURE — A9270 NON-COVERED ITEM OR SERVICE: HCPCS | Performed by: STUDENT IN AN ORGANIZED HEALTH CARE EDUCATION/TRAINING PROGRAM

## 2023-12-08 PROCEDURE — A9270 NON-COVERED ITEM OR SERVICE: HCPCS | Performed by: FAMILY MEDICINE

## 2023-12-08 PROCEDURE — 25010000002 LORAZEPAM PER 2 MG: Performed by: STUDENT IN AN ORGANIZED HEALTH CARE EDUCATION/TRAINING PROGRAM

## 2023-12-08 PROCEDURE — 63710000001 HYDROXYZINE 25 MG TABLET: Performed by: FAMILY MEDICINE

## 2023-12-08 PROCEDURE — 63710000001 SENNOSIDES-DOCUSATE 8.6-50 MG TABLET: Performed by: STUDENT IN AN ORGANIZED HEALTH CARE EDUCATION/TRAINING PROGRAM

## 2023-12-08 PROCEDURE — 97161 PT EVAL LOW COMPLEX 20 MIN: CPT

## 2023-12-08 PROCEDURE — 25010000002 CEFAZOLIN IN DEXTROSE 2-4 GM/100ML-% SOLUTION: Performed by: STUDENT IN AN ORGANIZED HEALTH CARE EDUCATION/TRAINING PROGRAM

## 2023-12-08 PROCEDURE — 63710000001 OXYCODONE 5 MG TABLET: Performed by: STUDENT IN AN ORGANIZED HEALTH CARE EDUCATION/TRAINING PROGRAM

## 2023-12-08 PROCEDURE — 99213 OFFICE O/P EST LOW 20 MIN: CPT | Performed by: STUDENT IN AN ORGANIZED HEALTH CARE EDUCATION/TRAINING PROGRAM

## 2023-12-08 PROCEDURE — 63710000001 FAMOTIDINE 20 MG TABLET: Performed by: STUDENT IN AN ORGANIZED HEALTH CARE EDUCATION/TRAINING PROGRAM

## 2023-12-08 PROCEDURE — 63710000001 ACETAMINOPHEN EXTRA STRENGTH 500 MG TABLET: Performed by: STUDENT IN AN ORGANIZED HEALTH CARE EDUCATION/TRAINING PROGRAM

## 2023-12-08 PROCEDURE — 85014 HEMATOCRIT: CPT | Performed by: STUDENT IN AN ORGANIZED HEALTH CARE EDUCATION/TRAINING PROGRAM

## 2023-12-08 PROCEDURE — 63710000001 ROSUVASTATIN 20 MG TABLET: Performed by: FAMILY MEDICINE

## 2023-12-08 PROCEDURE — 97150 GROUP THERAPEUTIC PROCEDURES: CPT

## 2023-12-08 PROCEDURE — 97165 OT EVAL LOW COMPLEX 30 MIN: CPT

## 2023-12-08 PROCEDURE — 63710000001 ACETAMINOPHEN 325 MG TABLET: Performed by: FAMILY MEDICINE

## 2023-12-08 PROCEDURE — 63710000001 ASPIRIN 325 MG TABLET: Performed by: STUDENT IN AN ORGANIZED HEALTH CARE EDUCATION/TRAINING PROGRAM

## 2023-12-08 PROCEDURE — 63710000001 FERROUS SULFATE 325 (65 FE) MG TABLET: Performed by: STUDENT IN AN ORGANIZED HEALTH CARE EDUCATION/TRAINING PROGRAM

## 2023-12-08 PROCEDURE — 81003 URINALYSIS AUTO W/O SCOPE: CPT | Performed by: STUDENT IN AN ORGANIZED HEALTH CARE EDUCATION/TRAINING PROGRAM

## 2023-12-08 PROCEDURE — 99024 POSTOP FOLLOW-UP VISIT: CPT | Performed by: STUDENT IN AN ORGANIZED HEALTH CARE EDUCATION/TRAINING PROGRAM

## 2023-12-08 PROCEDURE — 94799 UNLISTED PULMONARY SVC/PX: CPT

## 2023-12-08 PROCEDURE — 97116 GAIT TRAINING THERAPY: CPT

## 2023-12-08 PROCEDURE — 25010000002 HALOPERIDOL LACTATE PER 5 MG

## 2023-12-08 PROCEDURE — 80048 BASIC METABOLIC PNL TOTAL CA: CPT | Performed by: STUDENT IN AN ORGANIZED HEALTH CARE EDUCATION/TRAINING PROGRAM

## 2023-12-08 PROCEDURE — 85018 HEMOGLOBIN: CPT | Performed by: STUDENT IN AN ORGANIZED HEALTH CARE EDUCATION/TRAINING PROGRAM

## 2023-12-08 PROCEDURE — 97535 SELF CARE MNGMENT TRAINING: CPT

## 2023-12-08 PROCEDURE — 63710000001 FLUOXETINE 20 MG CAPSULE: Performed by: FAMILY MEDICINE

## 2023-12-08 RX ORDER — HALOPERIDOL 5 MG/ML
2 INJECTION INTRAMUSCULAR EVERY 12 HOURS PRN
Status: DISCONTINUED | OUTPATIENT
Start: 2023-12-08 | End: 2023-12-09 | Stop reason: HOSPADM

## 2023-12-08 RX ORDER — LORAZEPAM 2 MG/ML
0.5 INJECTION INTRAMUSCULAR EVERY 6 HOURS PRN
Status: DISCONTINUED | OUTPATIENT
Start: 2023-12-08 | End: 2023-12-09 | Stop reason: HOSPADM

## 2023-12-08 RX ORDER — HALOPERIDOL 5 MG/ML
2 INJECTION INTRAMUSCULAR ONCE
Status: COMPLETED | OUTPATIENT
Start: 2023-12-08 | End: 2023-12-08

## 2023-12-08 RX ORDER — HALOPERIDOL 5 MG/ML
INJECTION INTRAMUSCULAR
Status: COMPLETED
Start: 2023-12-08 | End: 2023-12-08

## 2023-12-08 RX ORDER — CARVEDILOL 3.12 MG/1
3.12 TABLET ORAL 2 TIMES DAILY
COMMUNITY

## 2023-12-08 RX ORDER — LORAZEPAM 2 MG/ML
0.5 INJECTION INTRAMUSCULAR ONCE
Status: COMPLETED | OUTPATIENT
Start: 2023-12-08 | End: 2023-12-08

## 2023-12-08 RX ORDER — MULTIPLE VITAMINS W/ MINERALS TAB 9MG-400MCG
1 TAB ORAL DAILY
COMMUNITY

## 2023-12-08 RX ADMIN — LORAZEPAM 0.5 MG: 2 INJECTION INTRAMUSCULAR; INTRAVENOUS at 11:53

## 2023-12-08 RX ADMIN — FERROUS SULFATE TAB 325 MG (65 MG ELEMENTAL FE) 325 MG: 325 (65 FE) TAB at 09:38

## 2023-12-08 RX ADMIN — ACETAMINOPHEN 650 MG: 325 TABLET ORAL at 15:41

## 2023-12-08 RX ADMIN — ASPIRIN 325 MG: 325 TABLET ORAL at 09:38

## 2023-12-08 RX ADMIN — CEFAZOLIN SODIUM 2000 MG: 2 INJECTION, SOLUTION INTRAVENOUS at 06:18

## 2023-12-08 RX ADMIN — FLUOXETINE HYDROCHLORIDE 20 MG: 20 CAPSULE ORAL at 09:38

## 2023-12-08 RX ADMIN — OXYCODONE HYDROCHLORIDE 5 MG: 5 TABLET ORAL at 03:42

## 2023-12-08 RX ADMIN — HYDROXYZINE HYDROCHLORIDE 12.5 MG: 25 TABLET, FILM COATED ORAL at 05:15

## 2023-12-08 RX ADMIN — ACETAMINOPHEN 1000 MG: 500 TABLET ORAL at 22:24

## 2023-12-08 RX ADMIN — DOCUSATE SODIUM 50MG AND SENNOSIDES 8.6MG 2 TABLET: 8.6; 5 TABLET, FILM COATED ORAL at 22:24

## 2023-12-08 RX ADMIN — HALOPERIDOL LACTATE 2 MG: 5 INJECTION, SOLUTION INTRAMUSCULAR at 12:17

## 2023-12-08 RX ADMIN — ACETAMINOPHEN 650 MG: 325 TABLET ORAL at 09:37

## 2023-12-08 RX ADMIN — ROSUVASTATIN 20 MG: 20 TABLET, FILM COATED ORAL at 22:24

## 2023-12-08 RX ADMIN — ASPIRIN 325 MG: 325 TABLET ORAL at 22:24

## 2023-12-08 RX ADMIN — FAMOTIDINE 40 MG: 20 TABLET ORAL at 09:39

## 2023-12-08 RX ADMIN — HALOPERIDOL 2 MG: 5 INJECTION INTRAMUSCULAR at 12:17

## 2023-12-08 NOTE — THERAPY EVALUATION
Patient Name: Pooja Chau  : 1945    MRN: [id number]                              Today's Date: 2023       Admit Date: 2023    Visit Dx:     ICD-10-CM ICD-9-CM   1. Difficulty in walking  R26.2 719.7   2. Primary osteoarthritis of right knee  M17.11 715.16   3. Decreased activities of daily living (ADL)  Z78.9 V49.89     Patient Active Problem List   Diagnosis    Essential hypertension    Anxiety with depression    Hyperlipidemia    Aortic stenosis, severe    Cardiac murmur    Macular degeneration    Colon polyp    Precancerous skin lesion    Hiatal hernia    Chronic pain of left knee    Hand pain    Tear of meniscus of knee    Spondylolisthesis    Spasm of back muscles    Osteoarthritis of right hand    Osteoarthritis of left knee    Gastroesophageal reflux disease    Stress incontinence, female    Dysplasia of vulva    Disorder of acromioclavicular joint    Chronic neck pain    Cervical radiculopathy    Anxiety    Acquired trigger finger    Vaginal prolapse    OAB (overactive bladder)    Obstructive sleep apnea    Myeloproliferative disorder    Iron deficiency anemia secondary to inadequate dietary iron intake    Essential thrombocythemia    Chronic diastolic (congestive) heart failure    Complete heart block    S/P TAVR (transcatheter aortic valve replacement)    Presence of cardiac pacemaker    Degenerative arthritis of knee, bilateral    Genitourinary syndrome of menopause    Primary osteoarthritis of right knee     Past Medical History:   Diagnosis Date    Aortic stenosis     Follows with Dr. Arrieta    Bladder incontinence     [employer]onics sacral neuromodulation placement     Claustrophobia     MRI    CML (chronic myelocytic leukemia)     Complete heart block     H/O Pneumonia     Heart disease     HPV (human papilloma virus) infection     Hypertension     Macular degeneration of left eye     Pacemaker 2023    Medtronic, follows with Dr. Arrieta    Primary osteoarthritis of right  knee 10/04/2023    Right knee pain     Seasonal depression      Past Surgical History:   Procedure Laterality Date    AORTIC VALVE REPAIR/REPLACEMENT N/A 06/21/2023    Procedure: TRANSFEMORAL TRANSCATHETER AORTIC VALVE REPLACEMENT;  Surgeon: Dalton Neumann MD;  Location: [facility];  Service: Cardiothoracic;  Laterality: N/A;    AORTIC VALVE REPAIR/REPLACEMENT N/A 06/21/2023    Procedure: Transfemoral Transcatheter Aortic Valve Replacement;  Surgeon: Tanner Hester MD;  Location: [facility];  Service: Cardiovascular;  Laterality: N/A;    CARDIAC CATHETERIZATION N/A 06/02/2023    Procedure: Left Heart Cath;  Surgeon: Tanner Hester MD;  Location: [facility] CATH INVASIVE LOCATION;  Service: Cardiology;  Laterality: N/A;  TAVR work up    CARDIAC ELECTROPHYSIOLOGY PROCEDURE N/A 06/22/2023    Procedure: Pacemaker DC new medtronic;  Surgeon: Viet Arechiga MD;  Location: [facility] CATH INVASIVE LOCATION;  Service: Cardiovascular;  Laterality: N/A;    CARPAL TUNNEL RELEASE Bilateral     CATARACT EXTRACTION      CERVICAL DISCECTOMY ANTERIOR  10/2018    c4-c7 with fusion    COLONOSCOPY      HYSTERECTOMY      at age 39   with BLSO    KNEE SURGERY Bilateral     2x/s in right   1 x in left    arthroscopy      SHOULDER SURGERY Left     TOE SURGERY Left     TOTAL KNEE ARTHROPLASTY Right 12/7/2023    Procedure: TOTAL KNEE ARTHROPLASTY WITH TAMMY ROBOT;  Surgeon: Missael Parmar MD;  Location: [facility];  Service: Robotics - Ortho;  Laterality: Right;    TRIGGER FINGER RELEASE      multiple    VULVA BIOPSY        General Information       Row Name 12/08/23 1506 12/08/23 1400       OT Time and Intention    Document Type therapy note (daily note)  -ES evaluation  -ES    Mode of Treatment individual therapy;occupational therapy  -ES individual therapy;occupational therapy  -ES      Row Name 12/08/23 1400          General Information    Patient Profile Reviewed yes  -ES     Prior Level of Function  independent:;ADL's;all household mobility  Patient is independent with ADLs at baseline. No device for functional mobility. Tub/shower combo, standing shower completion. Grab bars in shower. Groom in stance. No home O2 use. Denies recent falls.  -ES     Existing Precautions/Restrictions fall;weight bearing  -ES     Barriers to Rehab cognitive status  -ES       Row Name 12/08/23 1400          Occupational Profile    Reason for Services/Referral (Occupational Profile) Patient is 78 yr old female admitted to [facility] on 12/7/2023 after failed conservative management of right knee osteoarthritis.  Patient is postop day 1 right total knee arthroplasty, weightbearing as tolerated right lower extremity. OT evaluation and treatment ordered d/t recent decline in ADLs/transfer ability and discharge planning recommendations. No previous OT services for current condition.  -ES       Row Name 12/08/23 1400          Living Environment    People in Home facility resident  Assisted living  -ES       Row Name 12/08/23 1400          Cognition    Orientation Status (Cognition) oriented to;person  Patient pleasantly confused during evaluation.  Patient asks if we are in her home.  Reorientation provided  -ES       Row Name 12/08/23 1400          Safety Issues, Functional Mobility    Impairments Affecting Function (Mobility) balance;pain;range of motion (ROM);strength;cognition  -ES               User Key  (r) = Recorded By, (t) = Taken By, (c) = Cosigned By      Initials Name Provider Type    Milady Mills, OTR/L, CSRS Occupational Therapist                     Mobility/ADL's       Row Name 12/08/23 1457          Bed Mobility    Bed Mobility bed mobility (all) activities  -ES     All Activities, [place] (Bed Mobility) not tested  -ES     Comment, (Bed Mobility) not tested. Patient met seated in recliner at therapy arrival to room  -ES       Row Name 12/08/23 1507 12/08/23 1457       Transfers    Transfers --  sit-stand transfer;stand-sit transfer  -ES    Comment, (Transfers) Patient provided education and training on hand placement, rolling walker management and body mechanics in preparation for independent ADL routine completion at time of discharge  -ES --      Row Name 12/08/23 1457          Sit-Stand Transfer    Sit-Stand Cross (Transfers) minimum assist (75% patient effort);1 person assist  -ES     Assistive Device (Sit-Stand Transfers) walker, front-wheeled  -ES       Row Name 12/08/23 1457          Stand-Sit Transfer    Stand-Sit Cross (Transfers) minimum assist (75% patient effort);1 person assist  -ES     Assistive Device (Stand-Sit Transfers) walker, front-wheeled  -ES       Row Name 12/08/23 1507 12/08/23 1457       Activities of Daily Living    BADL Assessment/Intervention upper body dressing;lower body dressing  -ES bathing;upper body dressing;lower body dressing;grooming;feeding;toileting  -ES      Row Name 12/08/23 1457          Mobility    Extremity Weight-bearing Status right lower extremity  -ES     Right Lower Extremity (Weight-bearing Status) weight-bearing as tolerated (WBAT)  -ES       Row Name 12/08/23 1457          Bathing Assessment/Intervention    [place] Level (Bathing) bathing skills;maximum assist (25% patient effort)  -ES       Row Name 12/08/23 1507 12/08/23 1457       Upper Body Dressing Assessment/Training    [place] Level (Upper Body Dressing) upper body dressing skills;don;pull-over garment;minimum assist (75% patient effort)  -ES upper body dressing skills;minimum assist (75% patient effort)  -ES    Position (Upper Body Dressing) unsupported sitting  -ES --      Row Name 12/08/23 1507 12/08/23 1457       Lower Body Dressing Assessment/Training    [place] Level (Lower Body Dressing) lower body dressing skills;don;pants/bottoms;shoes/slippers;socks;verbal cues;nonverbal cues (demo/gesture);maximum assist (25% patient effort)  -ES lower body dressing  skills;maximum assist (25% patient effort)  -ES    Comment, (Lower Body Dressing) Patient provided education and training on adaptive lower body ADL engagement in preparation for independent ADL routine completion at time of discharge.  Patient requires max assist at initial evaluation  -ES --      Row Name 12/08/23 1457          Grooming Assessment/Training    [place] Level (Grooming) grooming skills;set up  -ES       Row Name 12/08/23 1457          Self-Feeding Assessment/Training    [place] Level (Feeding) feeding skills;set up  -ES       Row Name 12/08/23 1457          Toileting Assessment/Training    [place] Level (Toileting) toileting skills;maximum assist (25% patient effort)  -ES               User Key  (r) = Recorded By, (t) = Taken By, (c) = Cosigned By      Initials Name Provider Type    Milady Mills, KHUSHIR/L, CSRS Occupational Therapist                   Obj/Interventions       Row Name 12/08/23 1501          Vision Assessment/Intervention    Visual Impairment/Limitations WFL  -ES       Row Name 12/08/23 1501          Range of Motion Comprehensive    General Range of Motion bilateral upper extremity ROM WNL  -ES       Row Name 12/08/23 1501          Strength Comprehensive (MMT)    General Manual Muscle Testing (MMT) Assessment lower extremity strength deficits identified  -ES     Comment, General Manual Muscle Testing (MMT) Assessment BUEs WFL  -ES       Row Name 12/08/23 1501          Motor Skills    Motor Skills functional endurance  -ES     Functional Endurance fair minus  -ES       Row Name 12/08/23 1508 12/08/23 1501       Balance    Balance Assessment -- sitting dynamic balance;standing dynamic balance  -ES    Dynamic Sitting Balance -- standby assist  -ES    Position, Sitting Balance -- unsupported;sitting in chair  -ES    Dynamic Standing Balance -- minimal assist;1-person assist  -ES    Position/Device Used, Standing Balance -- supported;walker, front-wheeled  -ES    Balance  Interventions standing;dynamic;occupation based/functional task  -ES --    Comment, Balance Patient provided education and training on dynamic ADL engagement in supported stance or seated position to decrease patient fall risk at time of discharge  -ES --              User Key  (r) = Recorded By, (t) = Taken By, (c) = Cosigned By      Initials Name Provider Type    Milady Mills, OTR/L, CSRS Occupational Therapist                   Goals/Plan       Row Name 12/08/23 1504          Transfer Goal 1 (OT)    Activity/Assistive Device (Transfer Goal 1, OT) transfers, all  -ES     [place] Level/Cues Needed (Transfer Goal 1, OT) modified independence  -ES     Time Frame (Transfer Goal 1, OT) long term goal (LTG);10 days  -ES       Row Name 12/08/23 1504          Bathing Goal 1 (OT)    Activity/Device (Bathing Goal 1, OT) bathing skills, all  -ES     [place] Level/Cues Needed (Bathing Goal 1, OT) modified independence  -ES     Time Frame (Bathing Goal 1, OT) long term goal (LTG);10 days  -ES       Row Name 12/08/23 1504          Dressing Goal 1 (OT)    Activity/Device (Dressing Goal 1, OT) dressing skills, all  -ES     [place]/Cues Needed (Dressing Goal 1, OT) modified independence  -ES     Time Frame (Dressing Goal 1, OT) long term goal (LTG);10 days  -ES       Row Name 12/08/23 1504          Toileting Goal 1 (OT)    Activity/Device (Toileting Goal 1, OT) toileting skills, all  -ES     [place] Level/Cues Needed (Toileting Goal 1, OT) modified independence  -ES     Time Frame (Toileting Goal 1, OT) long term goal (LTG);10 days  -ES       Row Name 12/08/23 1504          Grooming Goal 1 (OT)    Activity/Device (Grooming Goal 1, OT) grooming skills, all  -ES     [place] (Grooming Goal 1, OT) modified independence  -ES     Time Frame (Grooming Goal 1, OT) long term goal (LTG);10 days  -ES       Row Name 12/08/23 1504          Therapy Assessment/Plan (OT)    Planned Therapy Interventions (OT) activity  tolerance training;BADL retraining;functional balance retraining;occupation/activity based interventions;patient/caregiver education/training;transfer/mobility retraining  -ES               User Key  (r) = Recorded By, (t) = Taken By, (c) = Cosigned By      Initials Name Provider Type    Milady Mills, OTR/L, CSRS Occupational Therapist                   Clinical Impression       Row Name 12/08/23 1508 12/08/23 1501       Plan of Care Review    Plan of Care Reviewed With -- patient  -ES    Outcome Evaluation Patient provided education and training on use of adaptive strategies to increase patient safety and independence with B and IADL task engagement, transfer training to maxamize patient safety with all functional transfers, and home modification for patient success and independence at time of discharge. Patient receptive to all education and training provided.  -ES Patient has experienced decline in function from baseline status, presenting w/ deficits related to balance, cognition, strenght, transfers and mobility that impede patient independence with activities of daily living.  Patient would benefit from skilled Occupational Therapy intervention to maxamize patient safety, and promote return to baseline independence.  -ES      Row Name 12/08/23 1501          Therapy Assessment/Plan (OT)    Rehab Potential (OT) good, to achieve stated therapy goals  -ES     Criteria for Skilled Therapeutic Interventions Met (OT) yes;meets criteria;skilled treatment is necessary  -ES     Therapy Frequency (OT) 5 times/wk  -ES       Row Name 12/08/23 1501          Therapy Plan Review/Discharge Plan (OT)    Anticipated Discharge Disposition (OT) inpatient rehabilitation facility  -ES       Row Name 12/08/23 1501          Positioning and Restraints    Pre-Treatment Position sitting in chair/recliner  -ES     Post Treatment Position chair  -ES               User Key  (r) = Recorded By, (t) = Taken By, (c) = Cosigned By       Initials Name Provider Type    Milady Mills, OTR/L, CSRS Occupational Therapist                   Outcome Measures       Row Name 12/08/23 1505          How much help from another is currently needed...    Putting on and taking off regular lower body clothing? 2  -ES     Bathing (including washing, rinsing, and drying) 2  -ES     Toileting (which includes using toilet bed pan or urinal) 2  -ES     Putting on and taking off regular upper body clothing 3  -ES     Taking care of personal grooming (such as brushing teeth) 3  -ES     Eating meals 4  -ES     AM-PAC 6 Clicks Score (OT) 16  -ES       Row Name 12/08/23 1300 12/08/23 1200       How much help from another person do you currently need...    Turning from your back to your side while in flat bed without using bedrails? 4  -FILEMON 4  -RH    Moving from lying on back to sitting on the side of a flat bed without bedrails? 3  -FILEMON 3  -RH    Moving to and from a bed to a chair (including a wheelchair)? 3  -FILEMON 2  -RH    Standing up from a chair using your arms (e.g., wheelchair, bedside chair)? 3  -FILEMON 2  -RH    Climbing 3-5 steps with a railing? 3  -FILEMON 2  -RH    To walk in hospital room? 3  -FILEMON 3  -RH    AM-PAC 6 Clicks Score (PT) 19  -FILEMON 16  -RH    Highest Level of Mobility Goal 6 --> Walk 10 steps or more  -FILEMON 5 --> Static standing  -RH      Row Name 12/08/23 1505 12/08/23 1300       Functional Assessment    Outcome Measure Options AM-PAC 6 Clicks Daily Activity (OT);Optimal Instrument  -ES AM-PAC 6 Clicks Basic Mobility (PT)  -FILEMON      Row Name 12/08/23 1505          Optimal Instrument    Optimal Instrument Optimal - 3  -ES     Bending/Stooping 3  -ES     Balancing 2  -ES     Standing 2  -ES     From the list, choose the 3 activities you would most like to be able to do without any difficulty Bending/stooping;Standing;Balancing  -ES     Total Score Optimal - 3 7  -ES               User Key  (r) = Recorded By, (t) = Taken By, (c) = Cosigned By      Initials Name  Provider Type    Carrillo Yanes PTA Physical Therapist Assistant    David Garber, PT Physical Therapist    Milady Mills, OTR/L, CSRS Occupational Therapist                      OT Recommendation and Plan  Planned Therapy Interventions (OT): activity tolerance training, BADL retraining, functional balance retraining, occupation/activity based interventions, patient/caregiver education/training, transfer/mobility retraining  Therapy Frequency (OT): 5 times/wk  Plan of Care Review  Plan of Care Reviewed With: patient  Outcome Evaluation: Patient provided education and training on use of adaptive strategies to increase patient safety and independence with B and IADL task engagement, transfer training to maxamize patient safety with all functional transfers, and home modification for patient success and independence at time of discharge. Patient receptive to all education and training provided.     Time Calculation:   Evaluation Complexity (OT)  Review Occupational Profile/Medical/Therapy History Complexity: brief/low complexity  Assessment, Occupational Performance/Identification of Deficit Complexity: 3-5 performance deficits  Clinical Decision Making Complexity (OT): problem focused assessment/low complexity  Overall Complexity of Evaluation (OT): low complexity     Time Calculation- OT       Row Name 12/08/23 1506 12/08/23 1240          Time Calculation- OT    OT Received On 12/08/23  -ES --     OT Goal Re-Cert Due Date 12/17/23  -ES --        Timed Charges    11683 - Gait Training Minutes  -- 9  -RH     24781 - OT Self Care/Mgmt Minutes 15  -ES --        Untimed Charges    OT Eval/Re-eval Minutes 20  -ES --        Total Minutes    Timed Charges Total Minutes 15  -ES 9  -RH     Untimed Charges Total Minutes 20  -ES --      Total Minutes 35  -ES 9  -RH               User Key  (r) = Recorded By, (t) = Taken By, (c) = Cosigned By      Initials Name Provider Type    Carrillo Yanes PTA Physical Therapist  Assistant    Milady Mills OTR/L, CSRS Occupational Therapist                  Therapy Charges for Today       Code Description Service Date Service Provider Modifiers Qty    29260377425 HC OT SELF CARE/MGMT/TRAIN EA 15 MIN 12/8/2023 Milady Prado OTR/L, CSRS GO 1    [id number]  OT EVAL LOW COMPLEXITY 2 12/8/2023 Milady Prado OTR/L, CSRS GO 1                 RANDY Tobin/L, CSRS  12/8/2023

## 2023-12-08 NOTE — PLAN OF CARE
Goal Outcome Evaluation:  Plan of Care Reviewed With: patient        Progress: improving  Outcome Evaluation: pt medicated with scheduled pain medication as ordered with voiced relief. pt up to bathroom with 1 assist/gait belt/walker, pt voiding without difficulty. no changes in pt's status.

## 2023-12-08 NOTE — SIGNIFICANT NOTE
12/08/23 7543   Plan   Plan  rounded with hospitalist and patient will not rehab - PT indicated that she would do well at home with home health. [facility] accepted and following. Transportation uncertain at this time - [place].

## 2023-12-08 NOTE — THERAPY EVALUATION
Acute Care - Physical Therapy Initial Evaluation  RAFFAELE Soni     Patient Name: Pooja Chau  : 1945  MRN: [id number]  Today's Date: 2023     Admit date: 2023     Referring Physician: Fabio Allison MD     Surgery Date:2023   Procedure(s) (LRB):  TOTAL KNEE ARTHROPLASTY WITH TAMMY ROBOT (Right)          Visit Dx:     ICD-10-CM ICD-9-CM   1. Difficulty in walking  R26.2 719.7   2. Primary osteoarthritis of right knee  M17.11 715.16     Patient Active Problem List   Diagnosis    Essential hypertension    Anxiety with depression    Hyperlipidemia    Aortic stenosis, severe    Cardiac murmur    Macular degeneration    Colon polyp    Precancerous skin lesion    Hiatal hernia    Chronic pain of left knee    Hand pain    Tear of meniscus of knee    Spondylolisthesis    Spasm of back muscles    Osteoarthritis of right hand    Osteoarthritis of left knee    Gastroesophageal reflux disease    Stress incontinence, female    Dysplasia of vulva    Disorder of acromioclavicular joint    Chronic neck pain    Cervical radiculopathy    Anxiety    Acquired trigger finger    Vaginal prolapse    OAB (overactive bladder)    Obstructive sleep apnea    Myeloproliferative disorder    Iron deficiency anemia secondary to inadequate dietary iron intake    Essential thrombocythemia    Chronic diastolic (congestive) heart failure    Complete heart block    S/P TAVR (transcatheter aortic valve replacement)    Presence of cardiac pacemaker    Degenerative arthritis of knee, bilateral    Genitourinary syndrome of menopause    Primary osteoarthritis of right knee     Past Medical History:   Diagnosis Date    Aortic stenosis     Follows with Dr. Arrieta    Bladder incontinence     Axonics sacral neuromodulation placement     Claustrophobia     MRI    CML (chronic myelocytic leukemia)     Complete heart block     H/O Pneumonia     Heart disease     HPV (human papilloma virus) infection     Hypertension     Macular  degeneration of left eye     Pacemaker 06/2023    Medtronic, follows with Dr. Arrieta    Primary osteoarthritis of right knee 10/04/2023    Right knee pain     Seasonal depression      Past Surgical History:   Procedure Laterality Date    AORTIC VALVE REPAIR/REPLACEMENT N/A 06/21/2023    Procedure: TRANSFEMORAL TRANSCATHETER AORTIC VALVE REPLACEMENT;  Surgeon: Dalton Neumann MD;  Location: [facility];  Service: Cardiothoracic;  Laterality: N/A;    AORTIC VALVE REPAIR/REPLACEMENT N/A 06/21/2023    Procedure: Transfemoral Transcatheter Aortic Valve Replacement;  Surgeon: Tanner Hester MD;  Location: [facility];  Service: Cardiovascular;  Laterality: N/A;    CARDIAC CATHETERIZATION N/A 06/02/2023    Procedure: Left Heart Cath;  Surgeon: Tanner Hester MD;  Location: [facility] CATH INVASIVE LOCATION;  Service: Cardiology;  Laterality: N/A;  TAVR work up    CARDIAC ELECTROPHYSIOLOGY PROCEDURE N/A 06/22/2023    Procedure: Pacemaker DC new medtronic;  Surgeon: Viet Arechiga MD;  Location: [facility] CATH INVASIVE LOCATION;  Service: Cardiovascular;  Laterality: N/A;    CARPAL TUNNEL RELEASE Bilateral     CATARACT EXTRACTION      CERVICAL DISCECTOMY ANTERIOR  10/2018    c4-c7 with fusion    COLONOSCOPY      HYSTERECTOMY      at age 39   with BLSO    KNEE SURGERY Bilateral     2x/s in right   1 x in left    arthroscopy      SHOULDER SURGERY Left     TOE SURGERY Left     TOTAL KNEE ARTHROPLASTY Right 12/7/2023    Procedure: TOTAL KNEE ARTHROPLASTY WITH TAMMY ROBOT;  Surgeon: Missael Parmar MD;  Location: [facility];  Service: Robotics - Ortho;  Laterality: Right;    TRIGGER FINGER RELEASE      multiple    VULVA BIOPSY       PT Assessment (last 12 hours)       PT Evaluation and Treatment       Row Name 12/08/23 1241 12/08/23 0800       Physical Therapy Time and Intention    Subjective Information complains of;pain  -RH complains of;pain  -FILEMON    Document Type therapy note (daily note)  -RH evaluation   -FILEMON    Mode of Treatment individual therapy;group therapy;physical therapy  - individual therapy;physical therapy  -FILEMON    Patient Effort good  - good  -FILEMON    Symptoms Noted During/After Treatment -- none  -FILEMON    Comment Gait training performed individually; therapeutic exercises performed in a group setting with * participants  - --      Row Name 12/08/23 1241 12/08/23 0800       General Information    Patient Observations alert;cooperative;agree to therapy  - alert;cooperative;agree to therapy  -FILEMON    Prior Level of Function -- independent:;all household mobility;community mobility  -FILEMON    Equipment Currently Used at Home -- none  -FILEMON    Existing Precautions/Restrictions -- fall;weight bearing  -FILEMON    Barriers to Rehab -- none identified  -FILEMON      Row Name 12/08/23 0800          Living Environment    Current Living Arrangements home  -FILEMON     People in Home alone  -FILEMON       [facility] Name 12/08/23 1241          Pain    Additional Documentation Pain Scale: FACES Pre/Post-Treatment (Group)  -[facility] Name 12/08/23 1241          Pain Scale: FACES Pre/Post-Treatment    Pain: FACES Scale, Pretreatment 0-->no hurt  -     Posttreatment Pain Rating --  3/10  -     Pain Location - Side/Orientation Right  -     Pain Location - knee  -[facility] Name 12/08/23 0800          Cognition    Orientation Status (Cognition) oriented x 3  -FILEMON       [facility] Name 12/08/23 1241          Range of Motion (ROM)    Range of Motion --  Pt R knee AAROM at 88 degrees flex and 9 degrees ext.  -       Row Name 12/08/23 0800          Range of Motion Comprehensive    General Range of Motion lower extremity range of motion deficits identified  5-80° R knee  -FILEMON       Row Name 12/08/23 1241          Strength (Manual Muscle Testing)    Strength (Manual Muscle Testing) --  Pt R knee ext strength at 2/5.  -       Row Name 12/08/23 0800          Strength Comprehensive (MMT)    General Manual Muscle Testing (MMT) Assessment lower extremity  strength deficits identified  RLE 3/5  -FILEMON       Row Name 12/08/23 1241 12/08/23 0800       Mobility    Extremity Weight-bearing Status right lower extremity  -RH right lower extremity  -FILEMON    Right Lower Extremity (Weight-bearing Status) weight-bearing as tolerated (WBAT)  -RH weight-bearing as tolerated (WBAT)  -FILEMON      Row Name 12/08/23 0800          Bed Mobility    Bed Mobility bed mobility (all) activities;supine-sit  -FILEMON     All Activities, [place] (Bed Mobility) minimum assist (75% patient effort)  -FILEMON     Supine-Sit [place] (Bed Mobility) minimum assist (75% patient effort)  -FILEMON       Row Name 12/08/23 1241 12/08/23 0800       Transfers    Transfers sit-stand transfer;stand-sit transfer  -RH bed-chair transfer;sit-stand transfer  -FILEMON      Row Name 12/08/23 0800          Bed-Chair Transfer    Bed-Chair [place] (Transfers) minimum assist (75% patient effort)  -FILEMON     Assistive Device (Bed-Chair Transfers) walker, front-wheeled  -FILEMON       Row Name 12/08/23 1241 12/08/23 0800       Sit-Stand Transfer    Sit-Stand [place] (Transfers) contact guard  -RH minimum assist (75% patient effort)  -FILEMON    Assistive Device (Sit-Stand Transfers) walker, front-wheeled  -RH walker, front-wheeled  -FILEMON      Row Name 12/08/23 1241          Stand-Sit Transfer    Stand-Sit [place] (Transfers) contact guard  -     Assistive Device (Stand-Sit Transfers) walker, front-wheeled  -RH       Row Name 12/08/23 1241 12/08/23 0800       Gait/Stairs (Locomotion)    Gait/Stairs Locomotion gait/ambulation assistive device  -RH gait/ambulation assistive device  -FILEMON    [place] Level (Gait) contact guard  -RH contact guard  -FILEMON    Assistive Device (Gait) walker, front-wheeled  -RH walker, front-wheeled  -FILEMON    Patient was able to Ambulate yes  -RH --    Distance in Feet (Gait) 125  -  -FILEMON    Pattern (Gait) 3-point;step-through  -RH --    Deviations/Abnormal Patterns (Gait) base of support, narrow;gait speed  decreased;stride length decreased  -RH --    Gait Assessment/Intervention Pt amb with RW and CGA with 3 point step-through gait pattern primarily with short distance of reciprocal gait.  Pt amb with narrow base of support and decreased gait speed, stride length, and RLE heel strike.  - --      Row Name 12/08/23 1241 12/08/23 0800       Safety Issues, Functional Mobility    Impairments Affecting Function (Mobility) balance;pain;range of motion (ROM);strength  -RH balance;pain;range of motion (ROM);strength  -FILEMON      Row Name 12/08/23 1241 12/08/23 0800       Balance    Balance Assessment standing dynamic balance  - standing dynamic balance  -FILEMON    Dynamic Standing Balance contact guard  - contact guard  -FILEMON    Position/Device Used, Standing Balance walker, front-wheeled  -RH walker, front-wheeled  -FILEMON      Row Name 12/08/23 1241          Motor Skills    Therapeutic Exercise knee;hip;ankle  -       Row Name 12/08/23 1241          Hip (Therapeutic Exercise)    Hip (Therapeutic Exercise) isometric exercises  -     Hip Isometrics (Therapeutic Exercise) right;gluteal sets;10 repetitions;2 sets  -       Row Name 12/08/23 1241          Knee (Therapeutic Exercise)    Knee (Therapeutic Exercise) isometric exercises;strengthening exercise  -     Knee Isometrics (Therapeutic Exercise) right;quad sets;10 repetitions;2 sets  -     Knee Strengthening (Therapeutic Exercise) right;heel slides;SLR (straight leg raise);SAQ (short arc quad);LAQ (long arc quad);10 repetitions;2 sets  -       Row Name 12/08/23 1241          Ankle (Therapeutic Exercise)    Ankle (Therapeutic Exercise) AROM (active range of motion)  -     Ankle AROM (Therapeutic Exercise) right;dorsiflexion;plantarflexion;10 repetitions;2 sets  -       Row Name             Wound 12/07/23 1513 Right anterior knee Incision    Wound - Properties Group Placement Date: 12/07/23  - Placement Time: 1513 -MH Side: Right  - Orientation: anterior  -  Location: knee  -MH Primary Wound Type: Incision  -MH    Retired Wound - Properties Group Placement Date: 12/07/23  - Placement Time: 1513  -MH Side: Right  -MH Orientation: anterior  -MH Location: knee  -MH Primary Wound Type: Incision  -MH    Retired Wound - Properties Group Date first assessed: 12/07/23  - Time first assessed: 1513  -MH Side: Right  -MH Location: knee  -MH Primary Wound Type: Incision  -MH      Row Name 12/08/23 0800          Plan of Care Review    Plan of Care Reviewed With patient  -FILEMON     Outcome Evaluation Pt presents with decreased ROM, strength, transfers and ambulation.  Skilled PT services will be required to address these mobility deficits.  -FILEMON       Row Name 12/08/23 1241          Positioning and Restraints    Post Treatment Position chair  -RH     In Chair reclined;call light within reach;encouraged to call for assist;exit alarm on  -RH       Row Name 12/08/23 0800          Therapy Assessment/Plan (PT)    Patient/Family Therapy Goals Statement (PT) Pt wants to walk without pain  -FILEMON     Rehab Potential (PT) good, to achieve stated therapy goals  -FILEMON     Criteria for Skilled Interventions Met (PT) skilled treatment is necessary  -FILEMON     Therapy Frequency (PT) 2 times/day  -FILEMON     Predicted Duration of Therapy Intervention (PT) 10 days  -FILEMON     Problem List (PT) problems related to;balance;mobility;range of motion (ROM);strength;pain  -FILEMON     Activity Limitations Related to Problem List (PT) unable to ambulate safely;unable to transfer safely  -FILEMON       Row Name 12/08/23 0800          PT Evaluation Complexity    History, PT Evaluation Complexity no personal factors and/or comorbidities  -FILEMON     Examination of Body Systems (PT Eval Complexity) total of 4 or more elements  -FILEMON     Clinical Presentation (PT Evaluation Complexity) stable  -FILEMON     Clinical Decision Making (PT Evaluation Complexity) low complexity  -FILEMON     Overall Complexity (PT Evaluation Complexity) low complexity  -FILEMON        Row Name 12/08/23 1241          Progress Summary (PT)    Progress Toward Functional Goals (PT) progress toward functional goals is good  -RH     Daily Progress Summary (PT) Pt is progressing well with their exercise program.  Will continue current plan of care.  -       Row Name 12/08/23 0800          Therapy Plan Review/Discharge Plan (PT)    Therapy Plan Review (PT) evaluation/treatment results reviewed;participants voiced agreement with care plan;participants included;patient  -FILEMON       Row Name 12/08/23 0800          Physical Therapy Goals    Transfer Goal Selection (PT) transfer, PT goal 1  -FILEMON     Gait Training Goal Selection (PT) gait training, PT goal 1  -FILEMON     ROM Goal Selection (PT) ROM, PT goal 1  -FILEMON     Strength Goal Selection (PT) strength, PT goal 1  -FILEMON       Row Name 12/08/23 0800          Transfer Goal 1 (PT)    Activity/Assistive Device (Transfer Goal 1, PT) transfers, all  -FILEMON     [place] Level/Cues Needed (Transfer Goal 1, PT) independent  -FILEMON     Time Frame (Transfer Goal 1, PT) long term goal (LTG);10 days  -FILEMON       Row Name 12/08/23 0800          Gait Training Goal 1 (PT)    Activity/Assistive Device (Gait Training Goal 1, PT) gait (walking locomotion);walker, rolling  -FILEMON     [place] Level (Gait Training Goal 1, PT) independent  -FILEMON     Distance (Gait Training Goal 1, PT) 300  -FILEMON     Time Frame (Gait Training Goal 1, PT) long term goal (LTG);10 days  -FILEMON       Row Name 12/08/23 0800          ROM Goal 1 (PT)    ROM Goal 1 (PT) Pt will demonstrate knee ROM from 0-110° on the affected side.  -FILEMON     Time Frame (ROM Goal 1, PT) long-term goal (LTG);10 days  -FILEMON       Row Name 12/08/23 0800          Strength Goal 1 (PT)    Strength Goal 1 (PT) Pt will demonstrate knee extension strength of 5/5 on the affected side.  -FILEMON     Time Frame (Strength Goal 1, PT) long term goal (LTG);10 days  -FILEMON               User Key  (r) = Recorded By, (t) = Taken By, (c) = Cosigned By       Initials Name Provider Type    RH Carrillo Rodriguez PTA Physical Therapist Assistant    Pastor Razo RN Registered Nurse    David Garber PT Physical Therapist                    Physical Therapy Education       Title: PT OT SLP Therapies (Done)       Topic: Physical Therapy (Done)       Point: Mobility training (Done)       Learning Progress Summary             Patient Acceptance, E,TB, VU by FILEMON at 12/8/2023 1308                         Point: Precautions (Done)       Learning Progress Summary             Patient Acceptance, E,TB, VU by FILEMON at 12/8/2023 1308                                         User Key       Initials Effective Dates Name Provider Type Discipline    FILEMON 06/03/21 -  David Velásquez PT Physical Therapist PT                  PT Recommendation and Plan  Anticipated Discharge Disposition (PT): home with outpatient therapy services  Planned Therapy Interventions (PT): balance training, bed mobility training, gait training, home exercise program, stair training, ROM (range of motion), strengthening, stretching, transfer training  Therapy Frequency (PT): 2 times/day  Plan of Care Reviewed With: patient  Outcome Evaluation: Pt presents with decreased ROM, strength, transfers and ambulation.  Skilled PT services will be required to address these mobility deficits.   Outcome Measures       Row Name 12/08/23 1300 12/08/23 1200          How much help from another person do you currently need...    Turning from your back to your side while in flat bed without using bedrails? 4  -FILEMON 4  -RH     Moving from lying on back to sitting on the side of a flat bed without bedrails? 3  -FILEMON 3  -RH     Moving to and from a bed to a chair (including a wheelchair)? 3  -FILEMON 2  -RH     Standing up from a chair using your arms (e.g., wheelchair, bedside chair)? 3  -FILEMON 2  -RH     Climbing 3-5 steps with a railing? 3  -FILEMON 2  -RH     To walk in hospital room? 3  -FILEMON 3  -RH     AM-PAC 6 Clicks Score (PT) 19  -FILEMON 16  -RH      Highest Level of Mobility Goal 6 --> Walk 10 steps or more  -FILEMON 5 --> Static standing  -RH        Functional Assessment    Outcome Measure Options AM-PAC 6 Clicks Basic Mobility (PT)  -FILEMON --               User Key  (r) = Recorded By, (t) = Taken By, (c) = Cosigned By      Initials Name Provider Type     Carrillo Rodriguez PTA Physical Therapist Assistant    David Garber, PT Physical Therapist                     Time Calculation:    PT Charges       Row Name 12/08/23 1240 12/08/23 0800          Time Calculation    PT Received On 12/08/23  -RH 12/08/23  -FILEMON     PT Goal Re-Cert Due Date -- 12/17/23  -FILEMON        Timed Charges    38974 - Gait Training Minutes  9  -RH --     74566 - PT Therapeutic Activity Minutes 4  -RH --        Untimed Charges    PT Eval/Re-eval Minutes -- 20  -FILEMON     PT Group Therapy Minutes 30  -RH --        Total Minutes    Timed Charges Total Minutes 13  -RH --     Untimed Charges Total Minutes 30  -RH 20  -FILEMON      Total Minutes 43  -RH 20  -FILEMON               User Key  (r) = Recorded By, (t) = Taken By, (c) = Cosigned By      Initials Name Provider Type    Carrillo Yanes PTA Physical Therapist Assistant    David Garber, PT Physical Therapist                  Therapy Charges for Today       Code Description Service Date Service Provider Modifiers Qty    54420991203 HC PT EVAL LOW COMPLEXITY 2 12/8/2023 David Velásquez, PT GP 1            PT G-Codes  Outcome Measure Options: AM-PAC 6 Clicks Basic Mobility (PT)  AM-PAC 6 Clicks Score (PT): 19    David Velásquez, PT  12/8/2023

## 2023-12-08 NOTE — PROGRESS NOTES
[facility]   Hospitalist Progress Note  Date: 2023  Patient Name: Pooja Chau  : 1945  MRN: [id number]  Date of admission: 2023  Room/Bed: [facility]/      Subjective   Subjective     Chief Complaint: Right knee pain    Summary:Pooja Chau is a 78 y.o. female with past medical history of JAK2 positive essential thrombocytopenia, chronic myeloproliferative disease, aortic valve regurgitation, anemia, hyperlipidemia, osteoarthritis and sleep apnea who presented with right knee pain.  Patient failed nonoperative management as outpatient and also had 2 prior knee arthroscopic procedures.  Given her persistent right knee pain due to right knee arthritis, patient underwent total knee arthroplasty with Emerald robot.  Tolerated the procedure well.    Interval Followup: No acute events overnight.  Patient was participating in the PT today, was slightly confused.  Later became combative and tried to leave.  We tried to reorient her but she was aggressive and combative.  Security was called, patient was placed on 3-point restraints.  Patient's daughter was called by nursing staff, she mentioned this is new and is normal at baseline.  She received 1 dose of Ativan with minimal relief.  Later, 1 dose of Haldol 2 mg IM was given.  Patient was calm and less confused later.    Review of Systems    All systems reviewed and negative except for what is outlined above.      Objective   Objective     Vitals:   Temp:  [97 °F (36.1 °C)-100 °F (37.8 °C)] 100 °F (37.8 °C)  Heart Rate:  [77-95] 87  Resp:  [12-18] 16  BP: (103-141)/(47-88) 138/55  Flow (L/min):  [2-4] 2  FiO2 (%):  [53 %-99 %] 99 %    Physical Exam   General: Awake, alert, NAD; combative and aggressive.  Cardiovascular: RRR, no murmurs   Pulmonary: CTA bilaterally; no wheezes; no conversational dyspnea  Gastrointestinal: S/ND/NT, +BS  Musculoskeletal: No gross deformities  Skin: No jaundice, no rash on exposed skin appreciated  Neuro: Alert,  awake but confused; speech clear; no tremor  : No Landrum catheter; no suprapubic tenderness    Result Review    Result Review:  I have personally reviewed these results:  [x]  Laboratory      Lab 12/08/23  0404   HEMOGLOBIN 11.5*   HEMATOCRIT 36.6         Lab 12/08/23  0404   SODIUM 138   POTASSIUM 3.6   CHLORIDE 98   CO2 27.0   ANION GAP 13.0   BUN 15   CREATININE 0.92   EGFR 63.5   GLUCOSE 150*   CALCIUM 8.8                         Brief Urine Lab Results  (Last result in the past 365 days)        Color   Clarity   Blood   Leuk Est   Nitrite   Protein   CREAT   Urine HCG        12/08/23 1344 Yellow   Clear   Negative   Negative   Negative   Negative                 [x]  Microbiology   Microbiology Results (last 10 days)       ** No results found for the last 240 hours. **          [x]  Radiology  XR Knee 1 or 2 View Right    Result Date: 12/7/2023    1. Status post right total knee arthroplasty.  Hardware in expected position with expected postoperative changes.      REINA ARNOLD MD       Electronically Signed and Approved By: REINA ARNOLD MD on 12/07/2023 at 17:25            []  EKG/Telemetry   []  Cardiology/Vascular   []  Pathology  []  Old records  []  Other:    Assessment & Plan   Assessment / Plan     Assessment:  Altered mentation, unknown etiology.  Primary osteoarthritis of right knee s/p total knee arthroplasty with Emerald robot  History of hypertension  History of JAK2 positive essential thrombocytopenia  Chronic myeloproliferative disease  Aortic valve regurgitation  Depression/anxiety  Hyperlipidemia  Chronic pain syndrome    Plan:  Patient currently being managed in medicine service.  Patient is confused.  Was combative and aggressive later.  Could be in the setting of recent surgery and various medications she received.  Received 1 dose of IV Ativan along with 1 dose of IM Haldol.  Calmer later.  Was placed on 3-point restraints given patient was very combative and aggressive; threat to  herself and the staff.  Medication list was reviewed, no medication concerning for her symptoms.  On as needed Haldol for agitation.  Continue reorienting the patient.  On fall precautions.  On as needed pain medication for total knee arthroplasty yesterday.  DVT prophylaxis as per orthopedic surgeon.  Orthopedic surgeon following the patient, appreciate further recommendation.  PT/OT following the patient, recommended home with health care.  Likely discharge in next 24-48 hours if patient is stable.       DVT prophylaxis:  Mechanical DVT prophylaxis orders are present.    CODE STATUS:   Code Status (Patient has no pulse and is not breathing): CPR (Attempt to Resuscitate)  Medical Interventions (Patient has pulse or is breathing): Full Support      Electronically signed by Fabio Allison MD, 12/08/23, 8:51 AM EST.

## 2023-12-08 NOTE — PROGRESS NOTES
[facility]     Progress Note    Patient Name: Pooja Chau  : 1945  MRN: [id number]  Primary Care Physician:  Risa Borrego APRN  Date of admission: 2023    Subjective   Subjective     HPI:  Painful overnight but overall well-controlled.  Denies chest pain or shortness of breath.  Up and ambulatory.  Reports that she anticipates discharge home tomorrow.  Voiding without difficulty.    Review of Systems   See HPI    Objective   Objective     Vitals:   Temp:  [97 °F (36.1 °C)-99.2 °F (37.3 °C)] 97.7 °F (36.5 °C)  Heart Rate:  [64-95] 82  Resp:  [12-19] 18  BP: ()/(45-88) 129/56  Flow (L/min):  [2-4] 2  FiO2 (%):  [53 %-99 %] 99 %  Physical Exam    General: Alert, no acute distress   Cardiac: Intact peripheral pulses   Pulmonary: Nonlabored respiration   Right lower extremity: Aquacel in place.  No drainage.  Swelling is mild.  Calf soft nontender.  Distal neurovascular intact.    Result Review      Hemoglobin   Date Value Ref Range Status   2023 11.5 (L) 12.0 - 15.9 g/dL Final     Hematocrit   Date Value Ref Range Status   2023 36.6 34.0 - 46.6 % Final        Result Review:  I have personally reviewed the results from the time of this admission to 2023 10:16 EST and agree with these findings:  [x]  Laboratory  []  Microbiology  [x]  Radiology  []  EKG/Telemetry   []  Cardiology/Vascular   []  Pathology  []  Old records  []  Other:      Assessment & Plan   Assessment / Plan     Brief Patient Summary:  Pooja Chau is a 78 y.o. female status post right total knee arthroplasty on     Active Hospital Problems:  Active Hospital Problems    Diagnosis     **Degenerative arthritis of knee, bilateral     Primary osteoarthritis of right knee      Plan:     X-rays reviewed: No fractures or hardware complication  Hemoglobin 11.5  Pain control  Postoperative antibiotic  Prophylaxis: Aspirin, SCD, mobilize  Weight-bear as tolerated right lower extremity  PT/OT    Dispo:  Anticipate discharge home tomorrow.  2-week orthopedic follow-up.       DVT prophylaxis:  Mechanical DVT prophylaxis orders are present.        Electronically signed by Missael Parmar MD, 12/08/23, 10:16 AM EST.

## 2023-12-08 NOTE — THERAPY TREATMENT NOTE
Acute Care - Physical Therapy Treatment Note  RAFFAELE Soni     Patient Name: Pooja Chau  : 1945  MRN: [id number]  Today's Date: 2023      Visit Dx:     ICD-10-CM ICD-9-CM   1. Primary osteoarthritis of right knee  M17.11 715.16     Patient Active Problem List   Diagnosis    Essential hypertension    Anxiety with depression    Hyperlipidemia    Aortic stenosis, severe    Cardiac murmur    Macular degeneration    Colon polyp    Precancerous skin lesion    Hiatal hernia    Chronic pain of left knee    Hand pain    Tear of meniscus of knee    Spondylolisthesis    Spasm of back muscles    Osteoarthritis of right hand    Osteoarthritis of left knee    Gastroesophageal reflux disease    Stress incontinence, female    Dysplasia of vulva    Disorder of acromioclavicular joint    Chronic neck pain    Cervical radiculopathy    Anxiety    Acquired trigger finger    Vaginal prolapse    OAB (overactive bladder)    Obstructive sleep apnea    Myeloproliferative disorder    Iron deficiency anemia secondary to inadequate dietary iron intake    Essential thrombocythemia    Chronic diastolic (congestive) heart failure    Complete heart block    S/P TAVR (transcatheter aortic valve replacement)    Presence of cardiac pacemaker    Degenerative arthritis of knee, bilateral    Genitourinary syndrome of menopause    Primary osteoarthritis of right knee     Past Medical History:   Diagnosis Date    Aortic stenosis     Follows with Dr. Arrieta    Bladder incontinence     [employer]onics sacral neuromodulation placement     Claustrophobia     MRI    CML (chronic myelocytic leukemia)     Complete heart block     H/O Pneumonia     Heart disease     HPV (human papilloma virus) infection     Hypertension     Macular degeneration of left eye     Pacemaker 2023    Medtronic, follows with Dr. Arrieta    Primary osteoarthritis of right knee 10/04/2023    Right knee pain     Seasonal depression      Past Surgical History:   Procedure  Laterality Date    AORTIC VALVE REPAIR/REPLACEMENT N/A 06/21/2023    Procedure: TRANSFEMORAL TRANSCATHETER AORTIC VALVE REPLACEMENT;  Surgeon: Dalton Neumann MD;  Location: [facility];  Service: Cardiothoracic;  Laterality: N/A;    AORTIC VALVE REPAIR/REPLACEMENT N/A 06/21/2023    Procedure: Transfemoral Transcatheter Aortic Valve Replacement;  Surgeon: Tanner Hester MD;  Location: [facility];  Service: Cardiovascular;  Laterality: N/A;    CARDIAC CATHETERIZATION N/A 06/02/2023    Procedure: Left Heart Cath;  Surgeon: Tanner Hester MD;  Location: [facility] CATH INVASIVE LOCATION;  Service: Cardiology;  Laterality: N/A;  TAVR work up    CARDIAC ELECTROPHYSIOLOGY PROCEDURE N/A 06/22/2023    Procedure: Pacemaker DC new medtronic;  Surgeon: Viet Arechiga MD;  Location: [facility] CATH INVASIVE LOCATION;  Service: Cardiovascular;  Laterality: N/A;    CARPAL TUNNEL RELEASE Bilateral     CATARACT EXTRACTION      CERVICAL DISCECTOMY ANTERIOR  10/2018    c4-c7 with fusion    COLONOSCOPY      HYSTERECTOMY      at age 39   with BLSO    KNEE SURGERY Bilateral     2x/s in right   1 x in left    arthroscopy      SHOULDER SURGERY Left     TOE SURGERY Left     TOTAL KNEE ARTHROPLASTY Right 12/7/2023    Procedure: TOTAL KNEE ARTHROPLASTY WITH TAMMY ROBOT;  Surgeon: Missael Parmar MD;  Location: [facility];  Service: Robotics - Ortho;  Laterality: Right;    TRIGGER FINGER RELEASE      multiple    VULVA BIOPSY       PT Assessment (last 12 hours)       PT Evaluation and Treatment       Row Name 12/08/23 1241          Physical Therapy Time and Intention    Subjective Information complains of;pain  -RH     Document Type therapy note (daily note)  -RH     Mode of Treatment individual therapy;group therapy;physical therapy  -RH     Patient Effort good  -RH     Comment Gait training performed individually; therapeutic exercises performed in a group setting with * participants  -RH       Row Name 12/08/23 4372           General Information    Patient Observations alert;cooperative;agree to therapy  -[facility] Name 12/08/23 1241          Pain    Additional Documentation Pain Scale: FACES Pre/Post-Treatment (Group)  -[facility] Name 12/08/23 1241          Pain Scale: FACES Pre/Post-Treatment    Pain: FACES Scale, Pretreatment 0-->no hurt  -RH     Posttreatment Pain Rating --  3/10  -RH     Pain Location - Side/Orientation Right  -     Pain Location - knee  -[facility] Name 12/08/23 1241          Range of Motion (ROM)    Range of Motion --  Pt R knee AAROM at 88 degrees flex and 9 degrees ext.  -[facility] Name 12/08/23 1241          Strength (Manual Muscle Testing)    Strength (Manual Muscle Testing) --  Pt R knee ext strength at 2/5.  -[facility] Name 12/08/23 1241          Mobility    Extremity Weight-bearing Status right lower extremity  -     Right Lower Extremity (Weight-bearing Status) weight-bearing as tolerated (WBAT)  -[facility] Name 12/08/23 1241          Transfers    Transfers sit-stand transfer;stand-sit transfer  -[facility] Name 12/08/23 1241          Sit-Stand Transfer    Sit-Stand [place] (Transfers) contact guard  -     Assistive Device (Sit-Stand Transfers) walker, front-wheeled  -[facility] Name 12/08/23 1241          Stand-Sit Transfer    Stand-Sit [place] (Transfers) contact guard  -     Assistive Device (Stand-Sit Transfers) walker, front-wheeled  -[facility] Name 12/08/23 1241          Gait/Stairs (Locomotion)    Gait/Stairs Locomotion gait/ambulation assistive device  -     [place] Level (Gait) contact guard  -     Assistive Device (Gait) walker, front-wheeled  -     Patient was able to Ambulate yes  -RH     Distance in Feet (Gait) 125  -RH     Pattern (Gait) 3-point;step-through  -RH     Deviations/Abnormal Patterns (Gait) base of support, narrow;gait speed decreased;stride length decreased  -RH     Gait Assessment/Intervention Pt amb with RW and CGA with  3 point step-through gait pattern primarily with short distance of reciprocal gait.  Pt amb with narrow base of support and decreased gait speed, stride length, and RLE heel strike.  -       Row Name 12/08/23 1241          Safety Issues, Functional Mobility    Impairments Affecting Function (Mobility) balance;pain;range of motion (ROM);strength  -       Row Name 12/08/23 1241          Balance    Balance Assessment standing dynamic balance  -     Dynamic Standing Balance contact guard  -     Position/Device Used, Standing Balance walker, front-wheeled  -       Row Name 12/08/23 1241          Motor Skills    Therapeutic Exercise knee;hip;ankle  -       Row Name 12/08/23 1241          Hip (Therapeutic Exercise)    Hip (Therapeutic Exercise) isometric exercises  -     Hip Isometrics (Therapeutic Exercise) right;gluteal sets;10 repetitions;2 sets  -       Row Name 12/08/23 1241          Knee (Therapeutic Exercise)    Knee (Therapeutic Exercise) isometric exercises;strengthening exercise  -     Knee Isometrics (Therapeutic Exercise) right;quad sets;10 repetitions;2 sets  -     Knee Strengthening (Therapeutic Exercise) right;heel slides;SLR (straight leg raise);SAQ (short arc quad);LAQ (long arc quad);10 repetitions;2 sets  -       Row Name 12/08/23 1241          Ankle (Therapeutic Exercise)    Ankle (Therapeutic Exercise) AROM (active range of motion)  -     Ankle AROM (Therapeutic Exercise) right;dorsiflexion;plantarflexion;10 repetitions;2 sets  -       Row Name             Wound 12/07/23 1513 Right anterior knee Incision    Wound - Properties Group Placement Date: 12/07/23 - Placement Time: 1513 -MH Side: Right  -MH Orientation: anterior  -MH Location: knee  -MH Primary Wound Type: Incision  -MH    Retired Wound - Properties Group Placement Date: 12/07/23  - Placement Time: 1513  -MH Side: Right  -MH Orientation: anterior  -MH Location: knee  -MH Primary Wound Type: Incision  -MH     Retired Wound - Properties Group Date first assessed: 12/07/23  - Time first assessed: 1513  - Side: Right  -MH Location: knee  -MH Primary Wound Type: Incision  -MH      Row Name 12/08/23 1241          Positioning and Restraints    Post Treatment Position chair  -RH     In Chair reclined;call light within reach;encouraged to call for assist;exit alarm on  -RH       Row Name 12/08/23 1241          Progress Summary (PT)    Progress Toward Functional Goals (PT) progress toward functional goals is good  -RH     Daily Progress Summary (PT) Pt is progressing well with their exercise program.  Will continue current plan of care.  -               User Key  (r) = Recorded By, (t) = Taken By, (c) = Cosigned By      Initials Name Provider Type    Carrillo Yanes PTA Physical Therapist Assistant    Pastor Razo RN Registered Nurse                      PT Recommendation and Plan     Progress Summary (PT)  Progress Toward Functional Goals (PT): progress toward functional goals is good  Daily Progress Summary (PT): Pt is progressing well with their exercise program.  Will continue current plan of care.   Outcome Measures       Row Name 12/08/23 1200             How much help from another person do you currently need...    Turning from your back to your side while in flat bed without using bedrails? 4  -RH      Moving from lying on back to sitting on the side of a flat bed without bedrails? 3  -RH      Moving to and from a bed to a chair (including a wheelchair)? 2  -RH      Standing up from a chair using your arms (e.g., wheelchair, bedside chair)? 2  -RH      Climbing 3-5 steps with a railing? 2  -RH      To walk in hospital room? 3  -RH      AM-PAC 6 Clicks Score (PT) 16  -RH      Highest Level of Mobility Goal 5 --> Static standing  -RH                User Key  (r) = Recorded By, (t) = Taken By, (c) = Cosigned By      Initials Name Provider Type    Carrillo Yanes PTA Physical Therapist Assistant                      Time Calculation:    PT Charges       Row Name 12/08/23 1240             Time Calculation    PT Received On 12/08/23  -RH         Timed Charges    27651 - Gait Training Minutes  9  -RH      74418 - PT Therapeutic Activity Minutes 4  -RH         Untimed Charges    PT Group Therapy Minutes 30  -RH         Total Minutes    Timed Charges Total Minutes 13  -RH      Untimed Charges Total Minutes 30  -RH       Total Minutes 43  -RH                User Key  (r) = Recorded By, (t) = Taken By, (c) = Cosigned By      Initials Name Provider Type     Carrillo Rodriguez PTA Physical Therapist Assistant                  Therapy Charges for Today       Code Description Service Date Service Provider Modifiers Qty    13141314382 HC GAIT TRAINING EA 15 MIN 12/8/2023 Carrillo Rodriguez PTA GP 1    52131575384 HC PT THER PROC GROUP 12/8/2023 Carrillo Rodriguez PTA GP 1            PT G-Codes  AM-PAC 6 Clicks Score (PT): 16    Carrillo Rodriguez PTA  12/8/2023

## 2023-12-08 NOTE — PLAN OF CARE
Goal Outcome Evaluation:  Plan of Care Reviewed With: patient           Outcome Evaluation: Patient has experienced decline in function from baseline status, presenting w/ deficits related to balance, cognition, strenght, transfers and mobility that impede patient independence with activities of daily living.  Patient would benefit from skilled Occupational Therapy intervention to maxamize patient safety, and promote return to baseline independence.      Anticipated Discharge Disposition (OT): inpatient rehabilitation facility

## 2023-12-09 VITALS
SYSTOLIC BLOOD PRESSURE: 107 MMHG | OXYGEN SATURATION: 97 % | HEART RATE: 87 BPM | WEIGHT: 173.5 LBS | DIASTOLIC BLOOD PRESSURE: 49 MMHG | BODY MASS INDEX: 29.62 KG/M2 | HEIGHT: 64 IN | TEMPERATURE: 98.8 F | RESPIRATION RATE: 16 BRPM

## 2023-12-09 LAB
ANION GAP SERPL CALCULATED.3IONS-SCNC: 9 MMOL/L (ref 5–15)
ANION GAP SERPL CALCULATED.3IONS-SCNC: 9.4 MMOL/L (ref 5–15)
BASOPHILS # BLD AUTO: 0.04 10*3/MM3 (ref 0–0.2)
BASOPHILS NFR BLD AUTO: 0.3 % (ref 0–1.5)
BUN SERPL-MCNC: 13 MG/DL (ref 8–23)
BUN SERPL-MCNC: 16 MG/DL (ref 8–23)
BUN/CREAT SERPL: 17.8 (ref 7–25)
BUN/CREAT SERPL: 21.6 (ref 7–25)
CALCIUM SPEC-SCNC: 8.8 MG/DL (ref 8.6–10.5)
CALCIUM SPEC-SCNC: 8.9 MG/DL (ref 8.6–10.5)
CHLORIDE SERPL-SCNC: 100 MMOL/L (ref 98–107)
CHLORIDE SERPL-SCNC: 99 MMOL/L (ref 98–107)
CO2 SERPL-SCNC: 28.6 MMOL/L (ref 22–29)
CO2 SERPL-SCNC: 29 MMOL/L (ref 22–29)
CREAT SERPL-MCNC: 0.73 MG/DL (ref 0.57–1)
CREAT SERPL-MCNC: 0.74 MG/DL (ref 0.57–1)
DEPRECATED RDW RBC AUTO: 52.1 FL (ref 37–54)
EGFRCR SERPLBLD CKD-EPI 2021: 82.4 ML/MIN/1.73
EGFRCR SERPLBLD CKD-EPI 2021: 83.8 ML/MIN/1.73
EOSINOPHIL # BLD AUTO: 0.03 10*3/MM3 (ref 0–0.4)
EOSINOPHIL NFR BLD AUTO: 0.2 % (ref 0.3–6.2)
ERYTHROCYTE [DISTWIDTH] IN BLOOD BY AUTOMATED COUNT: 15 % (ref 12.3–15.4)
GLUCOSE SERPL-MCNC: 116 MG/DL (ref 65–99)
GLUCOSE SERPL-MCNC: 121 MG/DL (ref 65–99)
HCT VFR BLD AUTO: 33.5 % (ref 34–46.6)
HGB BLD-MCNC: 10.7 G/DL (ref 12–15.9)
IMM GRANULOCYTES # BLD AUTO: 0.05 10*3/MM3 (ref 0–0.05)
IMM GRANULOCYTES NFR BLD AUTO: 0.4 % (ref 0–0.5)
LYMPHOCYTES # BLD AUTO: 2.33 10*3/MM3 (ref 0.7–3.1)
LYMPHOCYTES NFR BLD AUTO: 17.1 % (ref 19.6–45.3)
MAGNESIUM SERPL-MCNC: 1.9 MG/DL (ref 1.6–2.4)
MCH RBC QN AUTO: 30 PG (ref 26.6–33)
MCHC RBC AUTO-ENTMCNC: 31.9 G/DL (ref 31.5–35.7)
MCV RBC AUTO: 93.8 FL (ref 79–97)
MONOCYTES # BLD AUTO: 1.4 10*3/MM3 (ref 0.1–0.9)
MONOCYTES NFR BLD AUTO: 10.2 % (ref 5–12)
NEUTROPHILS NFR BLD AUTO: 71.8 % (ref 42.7–76)
NEUTROPHILS NFR BLD AUTO: 9.81 10*3/MM3 (ref 1.7–7)
NRBC BLD AUTO-RTO: 0 /100 WBC (ref 0–0.2)
PHOSPHATE SERPL-MCNC: 2.7 MG/DL (ref 2.5–4.5)
PLATELET # BLD AUTO: 295 10*3/MM3 (ref 140–450)
PMV BLD AUTO: 12.3 FL (ref 6–12)
POTASSIUM SERPL-SCNC: 2.9 MMOL/L (ref 3.5–5.2)
POTASSIUM SERPL-SCNC: 3.2 MMOL/L (ref 3.5–5.2)
RBC # BLD AUTO: 3.57 10*6/MM3 (ref 3.77–5.28)
SODIUM SERPL-SCNC: 137 MMOL/L (ref 136–145)
SODIUM SERPL-SCNC: 138 MMOL/L (ref 136–145)
WBC NRBC COR # BLD AUTO: 13.66 10*3/MM3 (ref 3.4–10.8)

## 2023-12-09 PROCEDURE — A9270 NON-COVERED ITEM OR SERVICE: HCPCS | Performed by: STUDENT IN AN ORGANIZED HEALTH CARE EDUCATION/TRAINING PROGRAM

## 2023-12-09 PROCEDURE — 63710000001 FERROUS SULFATE 325 (65 FE) MG TABLET: Performed by: STUDENT IN AN ORGANIZED HEALTH CARE EDUCATION/TRAINING PROGRAM

## 2023-12-09 PROCEDURE — 97150 GROUP THERAPEUTIC PROCEDURES: CPT

## 2023-12-09 PROCEDURE — A9270 NON-COVERED ITEM OR SERVICE: HCPCS | Performed by: FAMILY MEDICINE

## 2023-12-09 PROCEDURE — 84100 ASSAY OF PHOSPHORUS: CPT | Performed by: STUDENT IN AN ORGANIZED HEALTH CARE EDUCATION/TRAINING PROGRAM

## 2023-12-09 PROCEDURE — 63710000001 HYDROXYUREA 500 MG CAPSULE: Performed by: FAMILY MEDICINE

## 2023-12-09 PROCEDURE — 63710000001 FLUOXETINE 20 MG CAPSULE: Performed by: FAMILY MEDICINE

## 2023-12-09 PROCEDURE — 83735 ASSAY OF MAGNESIUM: CPT | Performed by: STUDENT IN AN ORGANIZED HEALTH CARE EDUCATION/TRAINING PROGRAM

## 2023-12-09 PROCEDURE — 63710000001 ACETAMINOPHEN EXTRA STRENGTH 500 MG TABLET: Performed by: STUDENT IN AN ORGANIZED HEALTH CARE EDUCATION/TRAINING PROGRAM

## 2023-12-09 PROCEDURE — 80048 BASIC METABOLIC PNL TOTAL CA: CPT | Performed by: STUDENT IN AN ORGANIZED HEALTH CARE EDUCATION/TRAINING PROGRAM

## 2023-12-09 PROCEDURE — 63710000001 SENNOSIDES-DOCUSATE 8.6-50 MG TABLET: Performed by: STUDENT IN AN ORGANIZED HEALTH CARE EDUCATION/TRAINING PROGRAM

## 2023-12-09 PROCEDURE — 63710000001 ASPIRIN 325 MG TABLET: Performed by: STUDENT IN AN ORGANIZED HEALTH CARE EDUCATION/TRAINING PROGRAM

## 2023-12-09 PROCEDURE — 63710000001 LISINOPRIL 20 MG TABLET: Performed by: FAMILY MEDICINE

## 2023-12-09 PROCEDURE — 63710000001 HYDROCHLOROTHIAZIDE 25 MG TABLET: Performed by: FAMILY MEDICINE

## 2023-12-09 PROCEDURE — 63710000001 FAMOTIDINE 20 MG TABLET: Performed by: STUDENT IN AN ORGANIZED HEALTH CARE EDUCATION/TRAINING PROGRAM

## 2023-12-09 PROCEDURE — 99213 OFFICE O/P EST LOW 20 MIN: CPT | Performed by: STUDENT IN AN ORGANIZED HEALTH CARE EDUCATION/TRAINING PROGRAM

## 2023-12-09 PROCEDURE — 85025 COMPLETE CBC W/AUTO DIFF WBC: CPT | Performed by: STUDENT IN AN ORGANIZED HEALTH CARE EDUCATION/TRAINING PROGRAM

## 2023-12-09 PROCEDURE — 63710000001 AMLODIPINE 5 MG TABLET: Performed by: FAMILY MEDICINE

## 2023-12-09 PROCEDURE — 63710000001 POTASSIUM CHLORIDE 10 MEQ CAPSULE CONTROLLED-RELEASE: Performed by: STUDENT IN AN ORGANIZED HEALTH CARE EDUCATION/TRAINING PROGRAM

## 2023-12-09 PROCEDURE — 97116 GAIT TRAINING THERAPY: CPT

## 2023-12-09 PROCEDURE — 99024 POSTOP FOLLOW-UP VISIT: CPT | Performed by: STUDENT IN AN ORGANIZED HEALTH CARE EDUCATION/TRAINING PROGRAM

## 2023-12-09 RX ORDER — HYDROCODONE BITARTRATE AND ACETAMINOPHEN 5; 325 MG/1; MG/1
1 TABLET ORAL EVERY 4 HOURS PRN
Status: DISCONTINUED | OUTPATIENT
Start: 2023-12-09 | End: 2023-12-09 | Stop reason: HOSPADM

## 2023-12-09 RX ORDER — POTASSIUM CHLORIDE 750 MG/1
40 CAPSULE, EXTENDED RELEASE ORAL ONCE
Status: COMPLETED | OUTPATIENT
Start: 2023-12-09 | End: 2023-12-09

## 2023-12-09 RX ORDER — HYDROCODONE BITARTRATE AND ACETAMINOPHEN 5; 325 MG/1; MG/1
1 TABLET ORAL EVERY 4 HOURS PRN
Qty: 30 TABLET | Refills: 0 | Status: SHIPPED | OUTPATIENT
Start: 2023-12-09 | End: 2023-12-16

## 2023-12-09 RX ORDER — LISINOPRIL 40 MG/1
40 TABLET ORAL DAILY
Qty: 30 TABLET | Refills: 0 | Status: SHIPPED | OUTPATIENT
Start: 2023-12-09

## 2023-12-09 RX ORDER — POTASSIUM CHLORIDE 750 MG/1
40 CAPSULE, EXTENDED RELEASE ORAL
Status: COMPLETED | OUTPATIENT
Start: 2023-12-09 | End: 2023-12-09

## 2023-12-09 RX ORDER — AMOXICILLIN 250 MG
2 CAPSULE ORAL 2 TIMES DAILY
Qty: 20 TABLET | Refills: 0 | Status: SHIPPED | OUTPATIENT
Start: 2023-12-09

## 2023-12-09 RX ORDER — ASPIRIN 325 MG
325 TABLET ORAL EVERY 12 HOURS SCHEDULED
Qty: 60 TABLET | Refills: 0 | Status: SHIPPED | OUTPATIENT
Start: 2023-12-09 | End: 2024-01-08

## 2023-12-09 RX ADMIN — ASPIRIN 325 MG: 325 TABLET ORAL at 09:14

## 2023-12-09 RX ADMIN — LISINOPRIL 40 MG: 20 TABLET ORAL at 09:14

## 2023-12-09 RX ADMIN — FLUOXETINE HYDROCHLORIDE 20 MG: 20 CAPSULE ORAL at 09:15

## 2023-12-09 RX ADMIN — POTASSIUM CHLORIDE 40 MEQ: 10 CAPSULE, COATED, EXTENDED RELEASE ORAL at 11:58

## 2023-12-09 RX ADMIN — HYDROXYUREA 500 MG: 500 CAPSULE ORAL at 09:16

## 2023-12-09 RX ADMIN — POTASSIUM CHLORIDE 40 MEQ: 10 CAPSULE, COATED, EXTENDED RELEASE ORAL at 09:13

## 2023-12-09 RX ADMIN — AMLODIPINE BESYLATE 10 MG: 5 TABLET ORAL at 09:14

## 2023-12-09 RX ADMIN — HYDROCHLOROTHIAZIDE 25 MG: 25 TABLET ORAL at 09:15

## 2023-12-09 RX ADMIN — FERROUS SULFATE TAB 325 MG (65 MG ELEMENTAL FE) 325 MG: 325 (65 FE) TAB at 09:14

## 2023-12-09 RX ADMIN — FAMOTIDINE 40 MG: 20 TABLET ORAL at 09:14

## 2023-12-09 RX ADMIN — ACETAMINOPHEN 1000 MG: 500 TABLET ORAL at 11:58

## 2023-12-09 RX ADMIN — POTASSIUM CHLORIDE 40 MEQ: 10 CAPSULE, COATED, EXTENDED RELEASE ORAL at 15:14

## 2023-12-09 RX ADMIN — DOCUSATE SODIUM 50MG AND SENNOSIDES 8.6MG 2 TABLET: 8.6; 5 TABLET, FILM COATED ORAL at 09:14

## 2023-12-09 NOTE — NURSING NOTE
Pt confused, aggressive, agitated. Pt attempting to pull out IV and pull off her surgical dressing to right knee. Pt grabbing her belongings and attempted to leave. Several attempts made to redirect pt and pt tried to bite and became combative with staff. Hospitalist MD notified, orders for restraints placed.  notified.

## 2023-12-09 NOTE — THERAPY TREATMENT NOTE
Acute Care - Physical Therapy Treatment Note  RAFFAELE Soni     Patient Name: Pooja Chau  : 1945  MRN: [id number]  Today's Date: 2023      Visit Dx:     ICD-10-CM ICD-9-CM   1. Difficulty in walking  R26.2 719.7   2. Primary osteoarthritis of right knee  M17.11 715.16   3. Decreased activities of daily living (ADL)  Z78.9 V49.89   4. Primary osteoarthritis of both knees  M17.0 715.16     Patient Active Problem List   Diagnosis    Essential hypertension    Anxiety with depression    Hyperlipidemia    Aortic stenosis, severe    Cardiac murmur    Macular degeneration    Colon polyp    Precancerous skin lesion    Hiatal hernia    Chronic pain of left knee    Hand pain    Tear of meniscus of knee    Spondylolisthesis    Spasm of back muscles    Osteoarthritis of right hand    Osteoarthritis of left knee    Gastroesophageal reflux disease    Stress incontinence, female    Dysplasia of vulva    Disorder of acromioclavicular joint    Chronic neck pain    Cervical radiculopathy    Anxiety    Acquired trigger finger    Vaginal prolapse    OAB (overactive bladder)    Obstructive sleep apnea    Myeloproliferative disorder    Iron deficiency anemia secondary to inadequate dietary iron intake    Essential thrombocythemia    Chronic diastolic (congestive) heart failure    Complete heart block    S/P TAVR (transcatheter aortic valve replacement)    Presence of cardiac pacemaker    Degenerative arthritis of knee, bilateral    Genitourinary syndrome of menopause    Primary osteoarthritis of right knee     Past Medical History:   Diagnosis Date    Aortic stenosis     Follows with Dr. Arrieta    Bladder incontinence     [employer]onics sacral neuromodulation placement     Claustrophobia     MRI    CML (chronic myelocytic leukemia)     Complete heart block     H/O Pneumonia     Heart disease     HPV (human papilloma virus) infection     Hypertension     Macular degeneration of left eye     Pacemaker 2023    [employer],  follows with Dr. Arrieta    Primary osteoarthritis of right knee 10/04/2023    Right knee pain     Seasonal depression      Past Surgical History:   Procedure Laterality Date    AORTIC VALVE REPAIR/REPLACEMENT N/A 06/21/2023    Procedure: TRANSFEMORAL TRANSCATHETER AORTIC VALVE REPLACEMENT;  Surgeon: Dalton Neumann MD;  Location: [facility];  Service: Cardiothoracic;  Laterality: N/A;    AORTIC VALVE REPAIR/REPLACEMENT N/A 06/21/2023    Procedure: Transfemoral Transcatheter Aortic Valve Replacement;  Surgeon: Tanner Hester MD;  Location: [facility];  Service: Cardiovascular;  Laterality: N/A;    CARDIAC CATHETERIZATION N/A 06/02/2023    Procedure: Left Heart Cath;  Surgeon: Tanner Hester MD;  Location: [facility] INVASIVE LOCATION;  Service: Cardiology;  Laterality: N/A;  TAVR work up    CARDIAC ELECTROPHYSIOLOGY PROCEDURE N/A 06/22/2023    Procedure: Pacemaker DC new medtronic;  Surgeon: Viet Arechiga MD;  Location: [facility] INVASIVE LOCATION;  Service: Cardiovascular;  Laterality: N/A;    CARPAL TUNNEL RELEASE Bilateral     CATARACT EXTRACTION      CERVICAL DISCECTOMY ANTERIOR  10/2018    c4-c7 with fusion    COLONOSCOPY      HYSTERECTOMY      at age 39   with BLSO    KNEE SURGERY Bilateral     2x/s in right   1 x in left    arthroscopy      SHOULDER SURGERY Left     TOE SURGERY Left     TOTAL KNEE ARTHROPLASTY Right 12/7/2023    Procedure: TOTAL KNEE ARTHROPLASTY WITH TAMMY ROBOT;  Surgeon: Missael Parmar MD;  Location: [facility];  Service: Robotics - Ortho;  Laterality: Right;    TRIGGER FINGER RELEASE      multiple    VULVA BIOPSY       PT Assessment (last 12 hours)       PT Evaluation and Treatment       Row Name 12/09/23 1248          Physical Therapy Time and Intention    Subjective Information complains of;pain  -RH     Document Type therapy note (daily note)  -RH     Mode of Treatment individual therapy;group therapy;physical therapy  -RH     Patient Effort good  -RH      Comment Gait training performed individually; therapeutic exercises performed in a group setting with * participants  -[facility] Name 12/09/23 1248          General Information    Patient Observations alert;cooperative;agree to therapy  -[facility] Name 12/09/23 1248          Pain Scale: FACES Pre/Post-Treatment    Pain: FACES Scale, Pretreatment 0-->no hurt  -RH     Posttreatment Pain Rating 0-->no hurt  -[facility] Name 12/09/23 1248          Range of Motion (ROM)    Range of Motion --  Pt R knee AAROM at 90 degrees flex and 8 degrees ext.  -[facility] Name 12/09/23 1248          Strength (Manual Muscle Testing)    Strength (Manual Muscle Testing) --  Pt R knee ext strength at 3-/5.  -[facility] Name 12/09/23 1248          Mobility    Extremity Weight-bearing Status right lower extremity  -     Right Lower Extremity (Weight-bearing Status) weight-bearing as tolerated (WBAT)  -[facility] Name 12/09/23 1248          Transfers    Transfers sit-stand transfer;stand-sit transfer  -[facility] Name 12/09/23 1248          Sit-Stand Transfer    Sit-Stand Jackson (Transfers) contact guard  -     Assistive Device (Sit-Stand Transfers) walker, front-wheeled  -[facility] Name 12/09/23 1248          Stand-Sit Transfer    Stand-Sit Jackson (Transfers) contact guard  -     Assistive Device (Stand-Sit Transfers) walker, front-wheeled  -[facility] Name 12/09/23 1248          Gait/Stairs (Locomotion)    Gait/Stairs Locomotion gait/ambulation assistive device  -     [place] Level (Gait) contact guard  -     Assistive Device (Gait) walker, front-wheeled  -     Patient was able to Ambulate yes  -RH     Distance in Feet (Gait) 115  -RH     Pattern (Gait) 3-point;step-through  -RH     Deviations/Abnormal Patterns (Gait) base of support, narrow;gait speed decreased;stride length decreased  -RH     Gait Assessment/Intervention Pt amb with RW and CGA with 3 point step-through gait pattern  with narrow base of support and decreased gait speed, stride length, and bilateral heel strike.  -       Row Name 12/09/23 1248          Safety Issues, Functional Mobility    Impairments Affecting Function (Mobility) balance;pain;range of motion (ROM);strength  -       Row Name 12/09/23 1248          Balance    Balance Assessment standing dynamic balance  -     Dynamic Standing Balance contact guard  -     Position/Device Used, Standing Balance walker, front-wheeled  -       Row Name 12/09/23 1248          Motor Skills    Therapeutic Exercise knee;hip;ankle  -       Row Name 12/09/23 1248          Hip (Therapeutic Exercise)    Hip (Therapeutic Exercise) isometric exercises  -     Hip Isometrics (Therapeutic Exercise) right;gluteal sets;10 repetitions;2 sets  -       Row Name 12/09/23 1248          Knee (Therapeutic Exercise)    Knee (Therapeutic Exercise) isometric exercises;strengthening exercise  -     Knee Isometrics (Therapeutic Exercise) right;quad sets;10 repetitions;2 sets  -     Knee Strengthening (Therapeutic Exercise) right;heel slides;SLR (straight leg raise);SAQ (short arc quad);LAQ (long arc quad);10 repetitions;2 sets  -       Row Name 12/09/23 1248          Ankle (Therapeutic Exercise)    Ankle (Therapeutic Exercise) AROM (active range of motion)  -     Ankle AROM (Therapeutic Exercise) right;dorsiflexion;plantarflexion;10 repetitions;2 sets  -       Row Name             Wound 12/07/23 1513 Right anterior knee Incision    Wound - Properties Group Placement Date: 12/07/23  - Placement Time: 1513  -MH Side: Right  -MH Orientation: anterior  -MH Location: knee  -MH Primary Wound Type: Incision  -MH    Retired Wound - Properties Group Placement Date: 12/07/23  - Placement Time: 1513  -MH Side: Right  -MH Orientation: anterior  -MH Location: knee  -MH Primary Wound Type: Incision  -MH    Retired Wound - Properties Group Date first assessed: 12/07/23  - Time first assessed:  1513  - Side: Right  -MH Location: knee  -MH Primary Wound Type: Incision  -MH      Row Name 12/09/23 1248          Positioning and Restraints    In Chair call light within reach;encouraged to call for assist;exit alarm on;reclined  -       Row Name 12/09/23 1248          Progress Summary (PT)    Progress Toward Functional Goals (PT) progress toward functional goals is good  -     Daily Progress Summary (PT) Pt is progressing well with their exercise program.  Will continue current plan of care.  -               User Key  (r) = Recorded By, (t) = Taken By, (c) = Cosigned By      Initials Name Provider Type    RH Carrillo Rodriguez PTA Physical Therapist Assistant    Pastor Razo, RN Registered Nurse                    Physical Therapy Education       Title: PT OT SLP Therapies (Done)       Topic: Physical Therapy (Done)       Point: Mobility training (Done)       Learning Progress Summary             Patient Acceptance, E,TB, VU by FILEMON at 12/8/2023 1308                         Point: Precautions (Done)       Learning Progress Summary             Patient Acceptance, E,TB, VU by FILEMON at 12/8/2023 1308                                         User Key       Initials Effective Dates Name Provider Type Discipline    FILEMON 06/03/21 -  David Velásquez, PT Physical Therapist PT                  PT Recommendation and Plan     Progress Summary (PT)  Progress Toward Functional Goals (PT): progress toward functional goals is good  Daily Progress Summary (PT): Pt is progressing well with their exercise program.  Will continue current plan of care.   Outcome Measures       Row Name 12/09/23 1200 12/08/23 1300 12/08/23 1200       How much help from another person do you currently need...    Turning from your back to your side while in flat bed without using bedrails? 4  -RH 4  -FILEMON 4  -RH    Moving from lying on back to sitting on the side of a flat bed without bedrails? 3  -RH 3  -FILEMON 3  -RH    Moving to and from a bed to a  chair (including a wheelchair)? 3  -RH 3  -FILEMON 2  -RH    Standing up from a chair using your arms (e.g., wheelchair, bedside chair)? 3  -RH 3  -FILEMON 2  -RH    Climbing 3-5 steps with a railing? 3  -RH 3  -FILEMON 2  -RH    To walk in hospital room? 3  -RH 3  -FILEMON 3  -RH    AM-PAC 6 Clicks Score (PT) 19  -RH 19  -FILEMON 16  -RH    Highest Level of Mobility Goal 6 --> Walk 10 steps or more  -RH 6 --> Walk 10 steps or more  -FILEMON 5 --> Static standing  -RH       Functional Assessment    Outcome Measure Options -- AM-PAC 6 Clicks Basic Mobility (PT)  -FILEMON --              User Key  (r) = Recorded By, (t) = Taken By, (c) = Cosigned By      Initials Name Provider Type     Carrillo Rodriguez PTA Physical Therapist Assistant    David Garber, PT Physical Therapist                     Time Calculation:    PT Charges       Row Name 12/09/23 1247             Time Calculation    PT Received On 12/09/23  -RH         Timed Charges    40345 - Gait Training Minutes  8  -RH      70423 - PT Therapeutic Activity Minutes 4  -RH         Untimed Charges    PT Group Therapy Minutes 25  -RH         Total Minutes    Timed Charges Total Minutes 12  -RH      Untimed Charges Total Minutes 25  -RH       Total Minutes 37  -RH                User Key  (r) = Recorded By, (t) = Taken By, (c) = Cosigned By      Initials Name Provider Type     Carrillo Rodriguez PTA Physical Therapist Assistant                  Therapy Charges for Today       Code Description Service Date Service Provider Modifiers Qty    71789101631 HC GAIT TRAINING EA 15 MIN 12/8/2023 Carrillo Rodriguez PTA GP 1    77688488630 HC PT THER PROC GROUP 12/8/2023 Carrillo Rodriguez PTA GP 1    93976229674 HC GAIT TRAINING EA 15 MIN 12/9/2023 Carrillo Rodriguez PTA GP 1    65072100857 HC PT THER PROC GROUP 12/9/2023 Carrillo Rodriguez PTA GP 1            PT G-Codes  Outcome Measure Options: AM-PAC 6 Clicks Daily Activity (OT), Optimal Instrument  AM-PAC 6 Clicks Score (PT): 19  AM-PAC 6 Clicks Score (OT): 16    Carrillo Rodriguez  PTA  12/9/2023

## 2023-12-09 NOTE — PLAN OF CARE
Goal Outcome Evaluation:  Plan of Care Reviewed With: patient        Progress: improving  Outcome Evaluation: Pt. regaining alertness. Restraints removed this shift. Pt. ambulated 110 feet. VSS. Pt. is voiding and hydrating well. Tylenol given for pain control. Pt. completed exercises this shift.

## 2023-12-09 NOTE — PROGRESS NOTES
[facility]     Progress Note    Patient Name: Pooja Chau  : 1945  MRN: [id number]  Primary Care Physician:  Risa Borrego APRN  Date of admission: 2023    Subjective   Subjective     HPI:  Confusion yesterday, beginning to improve.  Denies shortness of breath or chest pain.  Knee pain well controlled.  Up and ambulatory with PT.    Review of Systems   See HPI    Objective   Objective     Vitals:   Temp:  [98.1 °F (36.7 °C)-100 °F (37.8 °C)] 98.1 °F (36.7 °C)  Heart Rate:  [77-89] 79  Resp:  [14-16] 16  BP: (123-148)/(48-58) 133/49  Physical Exam    General: Alert, no acute distress   Cardiac: Intact peripheral pulses   Pulmonary: Nonlabored respirations   Right lower extremity: Aquacel in place with no drainage noted.  Swelling is mild.  Calf soft and nontender.  Distal neurovascular intact.    Result Review      WBC   Date Value Ref Range Status   2023 13.66 (H) 3.40 - 10.80 10*3/mm3 Final     RBC   Date Value Ref Range Status   2023 3.57 (L) 3.77 - 5.28 10*6/mm3 Final     Hemoglobin   Date Value Ref Range Status   2023 10.7 (L) 12.0 - 15.9 g/dL Final     Hematocrit   Date Value Ref Range Status   2023 33.5 (L) 34.0 - 46.6 % Final     MCV   Date Value Ref Range Status   2023 93.8 79.0 - 97.0 fL Final     MCH   Date Value Ref Range Status   2023 30.0 26.6 - 33.0 pg Final     MCHC   Date Value Ref Range Status   2023 31.9 31.5 - 35.7 g/dL Final     RDW   Date Value Ref Range Status   2023 15.0 12.3 - 15.4 % Final     RDW-SD   Date Value Ref Range Status   2023 52.1 37.0 - 54.0 fl Final     MPV   Date Value Ref Range Status   2023 12.3 (H) 6.0 - 12.0 fL Final     Platelets   Date Value Ref Range Status   2023 295 140 - 450 10*3/mm3 Final     Neutrophil %   Date Value Ref Range Status   2023 71.8 42.7 - 76.0 % Final     Lymphocyte %   Date Value Ref Range Status   2023 17.1 (L) 19.6 - 45.3 % Final     Monocyte %    Date Value Ref Range Status   12/09/2023 10.2 5.0 - 12.0 % Final     Eosinophil %   Date Value Ref Range Status   12/09/2023 0.2 (L) 0.3 - 6.2 % Final     Basophil %   Date Value Ref Range Status   12/09/2023 0.3 0.0 - 1.5 % Final     Immature Grans %   Date Value Ref Range Status   12/09/2023 0.4 0.0 - 0.5 % Final     Neutrophils, Absolute   Date Value Ref Range Status   12/09/2023 9.81 (H) 1.70 - 7.00 10*3/mm3 Final     Lymphocytes, Absolute   Date Value Ref Range Status   12/09/2023 2.33 0.70 - 3.10 10*3/mm3 Final     Monocytes, Absolute   Date Value Ref Range Status   12/09/2023 1.40 (H) 0.10 - 0.90 10*3/mm3 Final     Eosinophils, Absolute   Date Value Ref Range Status   12/09/2023 0.03 0.00 - 0.40 10*3/mm3 Final     Basophils, Absolute   Date Value Ref Range Status   12/09/2023 0.04 0.00 - 0.20 10*3/mm3 Final     Immature Grans, Absolute   Date Value Ref Range Status   12/09/2023 0.05 0.00 - 0.05 10*3/mm3 Final     nRBC   Date Value Ref Range Status   12/09/2023 0.0 0.0 - 0.2 /100 WBC Final        Result Review:  I have personally reviewed the results from the time of this admission to 12/9/2023 07:57 EST and agree with these findings:  [x]  Laboratory  []  Microbiology  [x]  Radiology  []  EKG/Telemetry   []  Cardiology/Vascular   []  Pathology  []  Old records  []  Other:      Assessment & Plan   Assessment / Plan     Brief Patient Summary:  Pooja Chau is a 78 y.o. female status post right total knee arthroplasty on 12/7    Active Hospital Problems:  Active Hospital Problems    Diagnosis     **Degenerative arthritis of knee, bilateral     Primary osteoarthritis of right knee      Plan:     Hemoglobin 10.7  Pain control  DVT prophylaxis: Aspirin, SCD, mobilize  Weight-bear as tolerated right lower extremity  PT/OT  Further care per primary team, appreciate assistance    Dispo: Stable from Ortho standpoint.  Anticipate discharge home when cleared per primary team.  2-week follow-up after discharge  for reevaluation.    DVT prophylaxis:  Mechanical DVT prophylaxis orders are present.        Electronically signed by Missael Parmar MD, 12/09/23, 7:58 AM EST.

## 2023-12-09 NOTE — PLAN OF CARE
Goal Outcome Evaluation:      Pt's vital signs have been stable throughout the shift. Pt's pain has been controlled throughout the shift. The pt is going home today and a friend is taking her home. Pt was able to work with PT today before discharge.

## 2023-12-10 LAB
QT INTERVAL: 432 MS
QTC INTERVAL: 502 MS

## 2023-12-20 ENCOUNTER — TELEPHONE (OUTPATIENT)
Dept: ORTHOPEDIC SURGERY | Facility: CLINIC | Age: 78
End: 2023-12-20
Payer: MEDICARE

## 2023-12-20 NOTE — TELEPHONE ENCOUNTER
LVM FOR PATIENT THAT [facility] HAS OPENING TOMORROW MORNING 12-21-23 AT [facility] FOR HUB TO GEMMA

## 2023-12-20 NOTE — TELEPHONE ENCOUNTER
Caller: Pooja Chau    Relationship to patient: Self    Best call back number: [phone]    Chief complaint: RIGHT KNEE    Type of visit: POST-OP    Requested date: ASAP, LATER THIS AFTERNOON IF POSSIBLE      If rescheduling, when is the original appointment: 12/20/23     Additional notes:PATIENT IS HAVING TRANSPORTATION ISSUES. SHE STATES SHE CAN COME IN LATER TODAY OR ANOTHER DAY THIS WEEK. HUB UNABLE TO R/S UNTIL 01/10/24.

## 2023-12-21 ENCOUNTER — TELEPHONE (OUTPATIENT)
Age: 78
End: 2023-12-21
Payer: MEDICARE

## 2023-12-21 ENCOUNTER — OFFICE VISIT (OUTPATIENT)
Dept: ORTHOPEDIC SURGERY | Facility: CLINIC | Age: 78
End: 2023-12-21
Payer: MEDICARE

## 2023-12-21 VITALS — HEART RATE: 98 BPM | HEIGHT: 64 IN | OXYGEN SATURATION: 96 % | WEIGHT: 173 LBS | BODY MASS INDEX: 29.53 KG/M2

## 2023-12-21 DIAGNOSIS — Z47.1 AFTERCARE FOLLOWING RIGHT KNEE JOINT REPLACEMENT SURGERY: Primary | ICD-10-CM

## 2023-12-21 DIAGNOSIS — M25.561 RIGHT KNEE PAIN, UNSPECIFIED CHRONICITY: ICD-10-CM

## 2023-12-21 DIAGNOSIS — Z96.651 AFTERCARE FOLLOWING RIGHT KNEE JOINT REPLACEMENT SURGERY: Primary | ICD-10-CM

## 2023-12-21 NOTE — TELEPHONE ENCOUNTER
1 NSVT 11/29/23 V rate 154 for 12 beats- first on device- has appt with Dr Arechiga/device ck 3/27/24

## 2023-12-21 NOTE — PROGRESS NOTES
"Chief Complaint  Follow-up and Pain of the Right Knee    Subjective      Pooja Chau presents to [facility] for follow up of her right knee.   Patient is 2 weeks status post right total knee arthroplasty with Emerald robot performed Dr. Parmar on 12/7/2023.  She is here today with use of no assistive device but she does occasionally use a cane but states she is doing well..  She denies any issues from the census and surgery.  She denies any fever or chills and surgery.  Today she states, her pain is controlled with nonnarcotic medications.  She is icing significantly.  She is also going to physical therapy.  She reports that she is having some constant swelling and seems to not be able to get that down very often.  But she also reports she is up and moving around \"probably a lot more than she should be.\"       Allergies   Allergen Reactions    Nsaids Swelling    Codeine Nausea Only       Objective     Vital Signs:   Vitals:    12/21/23 1124   Pulse: 98   SpO2: 96%   Weight: 78.5 kg (173 lb)   Height: 162.6 cm (64\")     Body mass index is 29.7 kg/m².    I reviewed the patient's chief complaint, history of present illness, review of systems, past medical history, surgical history, family history, social history, medications, and allergy list.     REVIEW OF SYSTEMS    Constitutional: Denies fevers, chills, weight loss  Cardiovascular: Denies chest pain, shortness of breath  Skin: Denies rashes, acute skin changes  Neurologic: Denies headache, loss of consciousness  MSK: Right knee pain .     Ortho Exam  Right Lower Extremity: Staples removed today.  Incision closed, no redness drainage or warmth.  Steri-Strips applied to incision.  Mild knee swelling.  -5 degrees knee extension.  Knee flexion 110 degrees.  Calf soft, nontender.  Demonstrates active ankle dorsiflexion and plantarflexion.  Sensation intact.  Neurovascular intact intact.  Palpable pedal pulse.          Imaging Results " (Most Recent)       Procedure Component Value Units Date/Time    XR Knee 3 View Right [[id number]] Resulted: 12/23/23 2200     Updated: 12/23/23 2202    Narrative:      X-Ray Report:  Study: X-rays ordered, taken in the office, and reviewed today  Site: Right knee x-ray  Indication: right total knee arthroplasty follow-up   View: AP, lateral, sunrise right knee view(s)  Findings: Stable, right total knee arthroplasty in place without evidence   of hardware complications or loosening.  No periprosthetic fractures are   visualized  Prior studies available for comparison: yes                 Assessment and Plan   Diagnoses and all orders for this visit:    1. Aftercare following right knee joint replacement surgery (Primary)  -     XR Knee 3 View Right    2. Right knee pain, unspecified chronicity         Pooja Chau presents today 2 weeks post op right total knee arthroplasty with Emerald Robot performed by Dr. Parmar on 12/7/2023. X-rays were reviewed with the patient today. staples removed today without complications. Incisions are well healing without active drainage or redness noted. No concerning signs of infection. Patient denies fever or chills. Incision site care was reviewed today. Patient instructed not to soak or submerge incisions. Do not apply any lotions, creams, or ointments to the incisions at this time.  We discussed concerning signs and symptoms regarding the incision sites.  Patient understood and agreed. Patient instructed to continue with formal physical therapy as well as home exercises. We discussed the importance of these exercises to improve range of motion. We discussed swelling management with ice and elevation. We discussed the importance of weaning from narcotic medications. Patient expressed understanding. Patient will continue ASA for DVT prophylaxis until 4 weeks post operative.      Patient will follow-up in 4 weeks for reevaluation.  We will obtain new x-rays of the right knee at  next visit.    Call or return if symptoms worsen or patient has any concerns.        Tobacco Use: Low Risk  (12/21/2023)    Patient History     Smoking Tobacco Use: Never     Smokeless Tobacco Use: Never     Passive Exposure: Never     Patient reports that they are a nonsmoker; cessation education not applicable.            Follow Up   No follow-ups on file.  There are no Patient Instructions on file for this visit.  Patient was given instructions and counseling regarding her condition or for health maintenance advice. Please see specific information pulled into the AVS if appropriate.

## 2024-01-18 ENCOUNTER — OFFICE VISIT (OUTPATIENT)
Dept: ORTHOPEDIC SURGERY | Facility: CLINIC | Age: 79
End: 2024-01-18
Payer: MEDICARE

## 2024-01-18 VITALS
DIASTOLIC BLOOD PRESSURE: 71 MMHG | BODY MASS INDEX: 29.02 KG/M2 | WEIGHT: 170 LBS | HEART RATE: 101 BPM | OXYGEN SATURATION: 94 % | SYSTOLIC BLOOD PRESSURE: 143 MMHG | HEIGHT: 64 IN

## 2024-01-18 DIAGNOSIS — Z47.1 AFTERCARE FOLLOWING RIGHT KNEE JOINT REPLACEMENT SURGERY: Primary | ICD-10-CM

## 2024-01-18 DIAGNOSIS — Z96.651 AFTERCARE FOLLOWING RIGHT KNEE JOINT REPLACEMENT SURGERY: Primary | ICD-10-CM

## 2024-01-18 PROCEDURE — 3078F DIAST BP <80 MM HG: CPT

## 2024-01-18 PROCEDURE — 3077F SYST BP >= 140 MM HG: CPT

## 2024-01-18 PROCEDURE — 99024 POSTOP FOLLOW-UP VISIT: CPT

## 2024-01-18 NOTE — PROGRESS NOTES
"Chief Complaint  Follow-up and Pain of the Right Knee    Subjective      Pooja Chau presents to [facility] for follow up of her right knee.  Patient is 6-week status post right total knee arthroplasty with Emerald robot performed by Dr. Parmar on 12/7/23.  She is here today without the use of any assistive devices.  She overall reports she is doing well.  She still having home health/physical therapy    Allergies   Allergen Reactions    Nsaids Swelling    Codeine Nausea Only       Objective     Vital Signs:   Vitals:    01/18/24 1001   BP: 143/71   Pulse: 101   SpO2: 94%   Weight: 77.1 kg (170 lb)   Height: 162.6 cm (64\")     Body mass index is 29.18 kg/m².    I reviewed the patient's chief complaint, history of present illness, review of systems, past medical history, surgical history, family history, social history, medications, and allergy list.     REVIEW OF SYSTEMS    Constitutional: Denies fevers, chills, weight loss  Cardiovascular: Denies chest pain, shortness of breath  Skin: Denies rashes, acute skin changes  Neurologic: Denies headache, loss of consciousness  MSK: Right knee pain.     Ortho Exam  Knee General: Alert, no acute distress.   Left knee: No pain with passive hip ROM.  Well-healed incision.  No evidence of redness or swelling or infection at at the incision site.  No knee effusion.  Sensation and distal neurovascularly intact.  Full knee extension. Flexion to 115 degrees. Calf soft, non-tender.  Full ankle range of motion. Palpable pedal pulses.           Imaging Results (Most Recent)       Procedure Component Value Units Date/Time    XR Knee 3 View Right [[id number]] Resulted: 01/18/24 1048     Updated: 01/18/24 1048    Narrative:      X-Ray Report:  Study: X-rays ordered, taken in the office, and reviewed today  Site: Right knee xray  Indication: Right total knee arthroplasty follow-up  View: AP, lateral, sunrise right knee view(s)  Findings: Stable and " intact right total knee arthroplasty without evidence   of complications or loosening.  No periprosthetic fractures are   visualized.  Prior studies available for comparison: yes                 Assessment and Plan   Diagnoses and all orders for this visit:    1. Aftercare following right knee joint replacement surgery (Primary)  -     XR Knee 3 View Right         Pooja Chau presents today 6 weeks post op right total knee arthroplasty with Emerald Robot performed by Dr. Parmar on 12/7/2023. X-rays were reviewed with the patient today. Incisions are well healing without active drainage or redness noted. No concerning signs of infection. We discussed concerning signs and symptoms regarding the incision sites.  Patient understood and agreed. Patient instructed to continue PT as well as home exercises.     Patient will follow-up in 6 weeks for reevaluation.  We will obtain new x-rays of the right knee at next visit.    Call or return if symptoms worsen or patient has any concerns.       Tobacco Use: Low Risk  (1/18/2024)    Patient History     Smoking Tobacco Use: Never     Smokeless Tobacco Use: Never     Passive Exposure: Never     Patient reports that they are a nonsmoker; cessation education not applicable.            Follow Up   Return in about 6 weeks (around 2/29/2024).  There are no Patient Instructions on file for this visit.  Patient was given instructions and counseling regarding her condition or for health maintenance advice. Please see specific information pulled into the AVS if appropriate.

## 2024-02-26 ENCOUNTER — TELEPHONE (OUTPATIENT)
Dept: CARDIOLOGY | Facility: CLINIC | Age: 79
End: 2024-02-26
Payer: MEDICARE

## 2024-02-26 ENCOUNTER — TELEPHONE (OUTPATIENT)
Dept: FAMILY MEDICINE CLINIC | Age: 79
End: 2024-02-26
Payer: MEDICARE

## 2024-02-26 NOTE — TELEPHONE ENCOUNTER
Patient calling said Dr. Hester had told her to hold her Lisinopril  Looks like when she was D/C from [facility]. Back in Dec.  She was to be on the Lisinopril   Dr. Hester is it ok to call in her lisinopril 40mg once daily

## 2024-02-26 NOTE — TELEPHONE ENCOUNTER
high BP concerns, running around 176/84 on the right and on left 130/85, pt on line 6000   I spoke with pt and she said that it will run high and then low I advised her that we are not the prescriber on her b/p meds to call her cardiologist . She said she would

## 2024-02-28 NOTE — TELEPHONE ENCOUNTER
I am confused by this message.  I do not see where I told her to stop the lisinopril.  Is she still taking it?  What is her blood pressure running?

## 2024-03-01 RX ORDER — FLUOXETINE HYDROCHLORIDE 20 MG/1
20 CAPSULE ORAL DAILY
Qty: 90 CAPSULE | Refills: 0 | Status: SHIPPED | OUTPATIENT
Start: 2024-03-01

## 2024-03-18 ENCOUNTER — TELEPHONE (OUTPATIENT)
Age: 79
End: 2024-03-18

## 2024-03-18 NOTE — TELEPHONE ENCOUNTER
The pt's MDT DC pacemaker remote report shows 2 new NSVT events.  The longest on 1/13 03:17 lasts 17 beats at avg V rate 163 bpm. Pt does not recall this event. 1 event on 1/14 07:41 lasting 10 beats at avg rate of 158 bpm. I spoke with pt and she is doing well and will follow up with LATRELL ZAFAR on 3/27/24. See strip below for details of NSVT event (longest).   Kindly,   Meg Schotanus Device RN

## 2024-03-27 ENCOUNTER — OFFICE VISIT (OUTPATIENT)
Age: 79
End: 2024-03-27
Payer: MEDICARE

## 2024-03-27 ENCOUNTER — CLINICAL SUPPORT NO REQUIREMENTS (OUTPATIENT)
Age: 79
End: 2024-03-27
Payer: MEDICARE

## 2024-03-27 ENCOUNTER — TELEPHONE (OUTPATIENT)
Dept: FAMILY MEDICINE CLINIC | Age: 79
End: 2024-03-27
Payer: MEDICARE

## 2024-03-27 VITALS
DIASTOLIC BLOOD PRESSURE: 90 MMHG | WEIGHT: 170 LBS | SYSTOLIC BLOOD PRESSURE: 148 MMHG | HEIGHT: 64 IN | HEART RATE: 106 BPM | BODY MASS INDEX: 29.02 KG/M2

## 2024-03-27 DIAGNOSIS — I44.2 COMPLETE HEART BLOCK: ICD-10-CM

## 2024-03-27 DIAGNOSIS — Z95.0 PRESENCE OF CARDIAC PACEMAKER: ICD-10-CM

## 2024-03-27 DIAGNOSIS — Z95.2 S/P TAVR (TRANSCATHETER AORTIC VALVE REPLACEMENT): Primary | ICD-10-CM

## 2024-03-27 DIAGNOSIS — I44.2 COMPLETE HEART BLOCK: Primary | ICD-10-CM

## 2024-03-27 NOTE — PROGRESS NOTES
Date of Office Visit: 2024  Encounter Provider: Viet Arechiga MD  Place of Service: [facility]  Patient Name: Pooja Chau  : 1945    Subjective:     Encounter Date:2024      Patient ID: Pooja Chau is a 79 y.o. female who has a cc of complete AV block post CELE and I did LBB area pacer in .     SEES DR ASHA ACUNA> we see for pacer        No anginal chest pain,   No sig rodriguez,   No soa,   No fainting,  No orthostasis.   No edema.   Exercise tolerance: decent     There have been no hospital admission since the last visit.     There have been no bleeding events.       Past Medical History:   Diagnosis Date    Aortic stenosis     Follows with Dr. Arrieta    Bladder incontinence     Axonics sacral neuromodulation placement     Claustrophobia     MRI    CML (chronic myelocytic leukemia)     Complete heart block     H/O Pneumonia     Heart disease     HPV (human papilloma virus) infection     Hypertension     Macular degeneration of left eye     Pacemaker 2023    Medtronic, follows with Dr. Arrieta    Primary osteoarthritis of right knee 10/04/2023    Right knee pain     Seasonal depression        Social History     Socioeconomic History    Marital status:     Number of children: 2   Tobacco Use    Smoking status: Never     Passive exposure: Never    Smokeless tobacco: Never   Vaping Use    Vaping status: Never Used   Substance and Sexual Activity    Alcohol use: Not Currently     Comment: non in 2 years    Drug use: Never    Sexual activity: Defer     Birth control/protection: Hysterectomy       Family History   Problem Relation Age of Onset    Diabetes Mother         complications from DM    Coronary artery disease Father     Stroke Father     Depression Sister     Cervical cancer Sister     Arthritis Sister     Depression Brother     Diabetes Maternal Grandmother     Arthritis Other     Anemia Other     Fibromyalgia Other     Breast  "cancer Neg Hx     Ovarian cancer Neg Hx     Colon cancer Neg Hx     Prostate cancer Neg Hx     Malig Hyperthermia Neg Hx        Review of Systems   Constitutional: Negative for fever and night sweats.   HENT:  Negative for ear pain and stridor.    Eyes:  Negative for discharge and visual halos.   Cardiovascular:  Negative for cyanosis.   Respiratory:  Negative for hemoptysis and sputum production.    Hematologic/Lymphatic: Negative for adenopathy.   Skin:  Negative for nail changes and unusual hair distribution.   Musculoskeletal:  Negative for gout and joint swelling.   Gastrointestinal:  Negative for bowel incontinence and flatus.   Genitourinary:  Negative for dysuria and flank pain.   Neurological:  Negative for seizures and tremors.   Psychiatric/Behavioral:  Negative for altered mental status. The patient is not nervous/anxious.             Objective:     Vitals:    03/27/24 1246   BP: 148/90   Pulse: 106   Weight: 77.1 kg (170 lb)   Height: 162.6 cm (64\")         Eyes:      General:         Right eye: No discharge.         Left eye: No discharge.   HENT:      Head: Normocephalic and atraumatic.   Neck:      Thyroid: No thyromegaly.      Vascular: No JVD.   Pulmonary:      Effort: Pulmonary effort is normal.      Breath sounds: Normal breath sounds. No rales.   Cardiovascular:      Normal rate. Regular rhythm.      No gallop.    Edema:     Peripheral edema absent.   Abdominal:      General: Bowel sounds are normal.      Palpations: Abdomen is soft.      Tenderness: There is no abdominal tenderness.   Musculoskeletal: Normal range of motion.         General: No deformity. Skin:     General: Skin is warm and dry.      Findings: No erythema.   Neurological:      Mental Status: Alert and oriented to person, place, and time.      Motor: Normal muscle tone.   Psychiatric:         Behavior: Behavior normal.         Thought Content: Thought content normal.           ECG 12 Lead    Date/Time: 3/27/2024 1:35 " PM  Performed by: Viet Arechiga MD    Authorized by: Viet Arechiga MD  Comparison: compared with previous ECG   Similar to previous ECG  Rhythm: sinus rhythm and paced          Lab Review:       Assessment:          Diagnosis Plan   1. S/P TAVR (transcatheter aortic valve replacement)        2. Complete heart block        3. Presence of cardiac pacemaker               Plan:     Pacer looks great. Nice LB paced QRS     I reviewed the pacemaker/ICD tracings and the pacing and sensing parameters are normal.  AF burden is  0    Rest of plan per Dr Hester

## 2024-05-09 ENCOUNTER — TELEPHONE (OUTPATIENT)
Dept: FAMILY MEDICINE CLINIC | Age: 79
End: 2024-05-09
Payer: MEDICARE

## 2024-05-10 DIAGNOSIS — I10 PRIMARY HYPERTENSION: ICD-10-CM

## 2024-05-10 RX ORDER — ROSUVASTATIN CALCIUM 20 MG/1
20 TABLET, COATED ORAL DAILY
Qty: 30 TABLET | Refills: 2 | Status: SHIPPED | OUTPATIENT
Start: 2024-05-10

## 2024-05-10 RX ORDER — AMLODIPINE BESYLATE 10 MG/1
10 TABLET ORAL DAILY
Qty: 90 TABLET | Refills: 4 | Status: SHIPPED | OUTPATIENT
Start: 2024-05-10

## 2024-05-10 RX ORDER — FLUOXETINE HYDROCHLORIDE 20 MG/1
20 CAPSULE ORAL DAILY
Qty: 90 CAPSULE | Refills: 0 | OUTPATIENT
Start: 2024-05-10

## 2024-05-14 ENCOUNTER — OFFICE VISIT (OUTPATIENT)
Dept: FAMILY MEDICINE CLINIC | Age: 79
End: 2024-05-14
Payer: MEDICARE

## 2024-05-14 ENCOUNTER — TELEPHONE (OUTPATIENT)
Dept: CARDIOLOGY | Facility: CLINIC | Age: 79
End: 2024-05-14
Payer: MEDICARE

## 2024-05-14 VITALS
SYSTOLIC BLOOD PRESSURE: 139 MMHG | HEIGHT: 64 IN | DIASTOLIC BLOOD PRESSURE: 75 MMHG | WEIGHT: 176.8 LBS | OXYGEN SATURATION: 97 % | HEART RATE: 76 BPM | TEMPERATURE: 98.2 F | BODY MASS INDEX: 30.19 KG/M2

## 2024-05-14 DIAGNOSIS — F41.1 GENERALIZED ANXIETY DISORDER: ICD-10-CM

## 2024-05-14 DIAGNOSIS — N95.8 GENITOURINARY SYNDROME OF MENOPAUSE: ICD-10-CM

## 2024-05-14 DIAGNOSIS — I10 PRIMARY HYPERTENSION: Primary | ICD-10-CM

## 2024-05-14 DIAGNOSIS — N90.3 DYSPLASIA OF VULVA: ICD-10-CM

## 2024-05-14 DIAGNOSIS — N89.8 VAGINAL IRRITATION: ICD-10-CM

## 2024-05-14 PROCEDURE — 1160F RVW MEDS BY RX/DR IN RCRD: CPT | Performed by: NURSE PRACTITIONER

## 2024-05-14 PROCEDURE — 99214 OFFICE O/P EST MOD 30 MIN: CPT | Performed by: NURSE PRACTITIONER

## 2024-05-14 PROCEDURE — 1159F MED LIST DOCD IN RCRD: CPT | Performed by: NURSE PRACTITIONER

## 2024-05-14 PROCEDURE — G2211 COMPLEX E/M VISIT ADD ON: HCPCS | Performed by: NURSE PRACTITIONER

## 2024-05-14 PROCEDURE — 3075F SYST BP GE 130 - 139MM HG: CPT | Performed by: NURSE PRACTITIONER

## 2024-05-14 PROCEDURE — 3078F DIAST BP <80 MM HG: CPT | Performed by: NURSE PRACTITIONER

## 2024-05-14 PROCEDURE — 1126F AMNT PAIN NOTED NONE PRSNT: CPT | Performed by: NURSE PRACTITIONER

## 2024-05-14 RX ORDER — HYDROCHLOROTHIAZIDE 25 MG/1
25 TABLET ORAL DAILY
Qty: 90 TABLET | Refills: 3 | Status: SHIPPED | OUTPATIENT
Start: 2024-05-14

## 2024-05-14 RX ORDER — ESTRADIOL 10 UG/1
1 INSERT VAGINAL 2 TIMES WEEKLY
Qty: 24 TABLET | Refills: 3 | Status: SHIPPED | OUTPATIENT
Start: 2024-05-16

## 2024-05-14 RX ORDER — FLUOXETINE HYDROCHLORIDE 20 MG/1
20 CAPSULE ORAL DAILY
Qty: 90 CAPSULE | Refills: 0 | Status: CANCELLED | OUTPATIENT
Start: 2024-05-14

## 2024-05-14 RX ORDER — FLUOXETINE HYDROCHLORIDE 20 MG/1
20 CAPSULE ORAL DAILY
Qty: 90 CAPSULE | Refills: 1 | Status: SHIPPED | OUTPATIENT
Start: 2024-05-14

## 2024-05-14 RX ORDER — HYDROXYUREA 500 MG/1
500 CAPSULE ORAL EVERY OTHER DAY
COMMUNITY
Start: 2024-05-10

## 2024-05-14 NOTE — PROGRESS NOTES
Chief Complaint  Pooja Chau presents to [facility] for Hypertension (6 mo. F/U ), Anxiety, and Depression      Subjective     History of Present Illness    Pooja presents today for follow up on Hypertension.    Current medication / treatment includes lisinopril (generic), amlodipine, and hydrochlorothiazide, and is compliant with medications.   Possible swelling in feet from the increase in amlodipine side effects reported. Dr. Hester did discontinue carvedilol at his last visit    Home blood pressure monitoring readings reported as home BP checks: not checked  Medication changes are not recommended at this time.    She reports that she was seen by gynecology for her changes in her vaginal  tissue.  She was instructed to follow up for a biopsy given her history but declines to return to the previous providers. She is interested in second opinion with a local GYN.  She continues to have atrophic  changes, irritation locally. She did use the Vagifem but did not see any improvement.  She was unsure what that would be helping.      Anxiety is well controlled on prozac    no medication changes desired at this time     Assessment and Plan       Diagnoses and all orders for this visit:    1. Primary hypertension (Primary)  Comments:  Well controlled  follow up with Dr. Hester as advised and continue current treament  Orders:  -     hydroCHLOROthiazide 25 MG tablet; Take 1 tablet by mouth Daily.  Dispense: 90 tablet; Refill: 3    2. Dysplasia of vulva  Comments:  advised to follow up with GYN - she prefers to see female in [place] and wants second opinion  Orders:  -     Ambulatory Referral to Gynecology    3. Vaginal irritation  -     Ambulatory Referral to Gynecology    4. Genitourinary syndrome of menopause  -     estradiol (Vagifem) 10 MCG tablet vaginal tablet; Insert 1 tablet into the vagina 2 (Two) Times a Week.  Dispense: 24 tablet; Refill: 3    5. Generalized anxiety  "disorder  -     FLUoxetine (PROzac) 20 MG capsule; Take 1 capsule by mouth Daily.  Dispense: 90 capsule; Refill: 1            Follow Up   Return in about 6 months (around 11/14/2024) for Recheck.  Future Appointments   Date Time Provider Department Center   6/13/2024  1:15 PM [facility] ECHO/VAS [facility]ONT [facility]   6/13/2024  2:00 PM Tanner Hester MD [facility] None   11/14/2024  2:15 PM Risa Borrego APRN [facility]   3/31/2025 11:00 AM [facility] [place] DEVICE CHECK [facility]40 None   3/31/2025 11:30 AM Johanny Welsh APRN [facility] None       New Medications Ordered This Visit   Medications    hydroCHLOROthiazide 25 MG tablet     Sig: Take 1 tablet by mouth Daily.     Dispense:  90 tablet     Refill:  3    estradiol (Vagifem) 10 MCG tablet vaginal tablet     Sig: Insert 1 tablet into the vagina 2 (Two) Times a Week.     Dispense:  24 tablet     Refill:  3    FLUoxetine (PROzac) 20 MG capsule     Sig: Take 1 capsule by mouth Daily.     Dispense:  90 capsule     Refill:  1       Medications Discontinued During This Encounter   Medication Reason    sennosides-docusate (PERICOLACE) 8.6-50 MG per tablet *Therapy completed    carvedilol (COREG) 3.125 MG tablet Discontinued by another clinician    estradiol (Vagifem) 10 MCG tablet vaginal tablet Reorder    hydroCHLOROthiazide (HYDRODIURIL) 25 MG tablet Reorder    FLUoxetine (PROzac) 20 MG capsule Reorder          Review of Systems    Objective     Vitals:    05/14/24 1355   BP: 139/75   BP Location: Right arm   Patient Position: Sitting   Cuff Size: Adult   Pulse: 76   Temp: 98.2 °F (36.8 °C)   TempSrc: Oral   SpO2: 97%   Weight: 80.2 kg (176 lb 12.8 oz)   Height: 162.6 cm (64\")     Body mass index is 30.35 kg/m².          Physical Exam  Constitutional:       General: She is not in acute distress.     Appearance: Normal appearance.   HENT:      Head: Normocephalic.   Cardiovascular:      Rate and Rhythm: Normal rate and regular rhythm.      " Heart sounds: Murmur heard.   Pulmonary:      Effort: Pulmonary effort is normal.      Breath sounds: Normal breath sounds.   Musculoskeletal:         General: Normal range of motion.      Right lower leg: Edema present.      Left lower leg: Edema present.   Neurological:      General: No focal deficit present.      Mental Status: She is alert and oriented to person, place, and time.   Psychiatric:         Mood and Affect: Mood normal.         Behavior: Behavior normal.            Result Review               Allergies   Allergen Reactions    Nsaids Swelling    Codeine Nausea Only    Oxycodone Hallucinations      Past Medical History:   Diagnosis Date    Aortic stenosis     Follows with Dr. Arrieta    Bladder incontinence     Axonics sacral neuromodulation placement     Claustrophobia     MRI    CML (chronic myelocytic leukemia)     Complete heart block     H/O Pneumonia     Heart disease     HPV (human papilloma virus) infection     Hypertension     Macular degeneration of left eye     Pacemaker 06/2023    Medtronic, follows with Dr. Arrieta    Primary osteoarthritis of right knee 10/04/2023    Right knee pain     Seasonal depression      Current Outpatient Medications   Medication Sig Dispense Refill    acetaminophen (TYLENOL) 325 MG tablet Take 2 tablets by mouth Every 4 (Four) Hours As Needed for Mild Pain.      amLODIPine (NORVASC) 10 MG tablet TAKE 1 TABLET BY MOUTH DAILY 90 tablet 4    [START ON 5/16/2024] estradiol (Vagifem) 10 MCG tablet vaginal tablet Insert 1 tablet into the vagina 2 (Two) Times a Week. 24 tablet 3    FLUoxetine (PROzac) 20 MG capsule Take 1 capsule by mouth Daily. 90 capsule 1    hydroCHLOROthiazide 25 MG tablet Take 1 tablet by mouth Daily. 90 tablet 3    hydroxyurea (HYDREA) 500 MG capsule Take 1 capsule by mouth Every Other Day.      hydrOXYzine (ATARAX) 25 MG tablet Take 0.5 tablets by mouth Every 8 (Eight) Hours As Needed for Anxiety. 90 tablet 1    lisinopril (PRINIVIL,ZESTRIL)  40 MG tablet Take 1 tablet by mouth Daily. 90 tablet 1    multivitamin with minerals (Ocuvite-Lutein) tablet tablet Take 1 tablet by mouth Daily.      rosuvastatin (CRESTOR) 20 MG tablet TAKE 1 TABLET BY MOUTH DAILY 30 tablet 2     No current facility-administered medications for this visit.     Past Surgical History:   Procedure Laterality Date    AORTIC VALVE REPAIR/REPLACEMENT N/A 06/21/2023    Procedure: TRANSFEMORAL TRANSCATHETER AORTIC VALVE REPLACEMENT;  Surgeon: Dalton Neumann MD;  Location: [facility];  Service: Cardiothoracic;  Laterality: N/A;    AORTIC VALVE REPAIR/REPLACEMENT N/A 06/21/2023    Procedure: Transfemoral Transcatheter Aortic Valve Replacement;  Surgeon: Tanner Hester MD;  Location: [facility];  Service: Cardiovascular;  Laterality: N/A;    CARDIAC CATHETERIZATION N/A 06/02/2023    Procedure: Left Heart Cath;  Surgeon: Tanner Hester MD;  Location: [facility] CATH INVASIVE LOCATION;  Service: Cardiology;  Laterality: N/A;  TAVR work up    CARDIAC ELECTROPHYSIOLOGY PROCEDURE N/A 06/22/2023    Procedure: Pacemaker DC new medtronic;  Surgeon: Viet Arechiga MD;  Location: [facility] CATH INVASIVE LOCATION;  Service: Cardiovascular;  Laterality: N/A;    CARPAL TUNNEL RELEASE Bilateral     CATARACT EXTRACTION      CERVICAL DISCECTOMY ANTERIOR  10/2018    c4-c7 with fusion    COLONOSCOPY      HYSTERECTOMY      at age 39   with BLSO    KNEE SURGERY Bilateral     2x/s in right   1 x in left    arthroscopy      SHOULDER SURGERY Left     TOE SURGERY Left     TOTAL KNEE ARTHROPLASTY Right 12/7/2023    Procedure: TOTAL KNEE ARTHROPLASTY WITH TAMMY ROBOT;  Surgeon: Missael Parmar MD;  Location: [facility];  Service: Robotics - Ortho;  Laterality: Right;    TRIGGER FINGER RELEASE      multiple    VULVA BIOPSY        Health Maintenance Due   Topic Date Due    DXA SCAN  Never done    TDAP/TD VACCINES (1 - Tdap) Never done      Immunization History   Administered Date(s) Administered     COVID-19 (MODERNA) 1st,2nd,3rd Dose Monovalent 09/21/2021, 09/30/2021    COVID-19 (MODERNA) Monovalent Original Booster 03/18/2022    COVID-19 (UNSPECIFIED) 03/18/2022    Fluzone High Dose =>65 Years ([employer] ONLY) 09/16/2021, 10/12/2022    Fluzone High-Dose 65+yrs 09/16/2021, 10/12/2022, 10/31/2023    Pneumococcal Conjugate 20-Valent (PCV20) 11/21/2022    Shingrix 06/20/2020, 08/20/2020         Part of this note may be an electronic transcription/translation of spoken language to printed   text using the Dragon Dictation System.      Risa Borrego, APRN

## 2024-06-12 ENCOUNTER — TELEPHONE (OUTPATIENT)
Dept: CARDIOLOGY | Facility: CLINIC | Age: 79
End: 2024-06-12

## 2024-06-12 NOTE — TELEPHONE ENCOUNTER
Caller: STEF    Relationship: SELF    Best call back number: [phone]    What is the best time to reach you: ANY        What was the call regarding: CANCEL ECHO 6/13/24- DOES NOT WISH TO RESCHEDULE AT THIS MOMENT- SHE WILL CALL BACK

## 2024-06-13 ENCOUNTER — TELEPHONE (OUTPATIENT)
Dept: CARDIOLOGY | Facility: HOSPITAL | Age: 79
End: 2024-06-13
Payer: MEDICARE

## 2024-06-13 NOTE — TELEPHONE ENCOUNTER
Pt is due for a 1-year TAVR follow up from 6/20/23. She canceled appts for today. I have called to check in, left message for return call. RTRN

## 2024-06-20 NOTE — TELEPHONE ENCOUNTER
I have spoken with Ms Chau. She states she is doing well and does not have limitations related to breathing, fatigue, or edema. She states her blood pressure is doing better & she is feeling well. Her 1-year follow up appts have been rescheduled to 7/1/24. She has my number if I can be of help. RTRN

## 2024-07-01 ENCOUNTER — HOSPITAL ENCOUNTER (OUTPATIENT)
Dept: CARDIOLOGY | Facility: HOSPITAL | Age: 79
Discharge: HOME | End: 2024-07-01
Payer: MEDICARE

## 2024-07-01 ENCOUNTER — OFFICE VISIT (OUTPATIENT)
Age: 79
End: 2024-07-01
Payer: MEDICARE

## 2024-07-01 VITALS
HEART RATE: 94 BPM | DIASTOLIC BLOOD PRESSURE: 70 MMHG | SYSTOLIC BLOOD PRESSURE: 130 MMHG | WEIGHT: 175 LBS | HEIGHT: 64 IN | BODY MASS INDEX: 29.88 KG/M2

## 2024-07-01 VITALS
WEIGHT: 176.37 LBS | HEIGHT: 64 IN | HEART RATE: 83 BPM | DIASTOLIC BLOOD PRESSURE: 70 MMHG | SYSTOLIC BLOOD PRESSURE: 130 MMHG | BODY MASS INDEX: 30.11 KG/M2

## 2024-07-01 DIAGNOSIS — Z95.2 S/P TAVR (TRANSCATHETER AORTIC VALVE REPLACEMENT): ICD-10-CM

## 2024-07-01 DIAGNOSIS — I35.0 AORTIC STENOSIS, SEVERE: ICD-10-CM

## 2024-07-01 DIAGNOSIS — Z95.0 PRESENCE OF CARDIAC PACEMAKER: ICD-10-CM

## 2024-07-01 DIAGNOSIS — I10 PRIMARY HYPERTENSION: ICD-10-CM

## 2024-07-01 DIAGNOSIS — Z95.2 S/P TAVR (TRANSCATHETER AORTIC VALVE REPLACEMENT): Primary | ICD-10-CM

## 2024-07-01 LAB
AORTIC DIMENSIONLESS INDEX: 0.5 (DI)
ASCENDING AORTA: 3.2 CM
BH CV ECHO MEAS - AO MAX PG: 22.3 MMHG
BH CV ECHO MEAS - AO MEAN PG: 12 MMHG
BH CV ECHO MEAS - AO ROOT DIAM: 2.7 CM
BH CV ECHO MEAS - AO V2 MAX: 236 CM/SEC
BH CV ECHO MEAS - AO V2 VTI: 45.6 CM
BH CV ECHO MEAS - AVA(I,D): 1.22 CM2
BH CV ECHO MEAS - EDV(CUBED): 123.8 ML
BH CV ECHO MEAS - EDV(MOD-SP2): 91 ML
BH CV ECHO MEAS - EDV(MOD-SP4): 105 ML
BH CV ECHO MEAS - EF(MOD-BP): 58.8 %
BH CV ECHO MEAS - EF(MOD-SP2): 57.1 %
BH CV ECHO MEAS - EF(MOD-SP4): 60 %
BH CV ECHO MEAS - ESV(CUBED): 39.1 ML
BH CV ECHO MEAS - ESV(MOD-SP2): 39 ML
BH CV ECHO MEAS - ESV(MOD-SP4): 42 ML
BH CV ECHO MEAS - FS: 31.9 %
BH CV ECHO MEAS - IVS/LVPW: 1.04 CM
BH CV ECHO MEAS - IVSD: 1.05 CM
BH CV ECHO MEAS - LAT PEAK E' VEL: 7.6 CM/SEC
BH CV ECHO MEAS - LV DIASTOLIC VOL/BSA (35-75): 56.8 CM2
BH CV ECHO MEAS - LV MASS(C)D: 189 GRAMS
BH CV ECHO MEAS - LV MAX PG: 5.6 MMHG
BH CV ECHO MEAS - LV MEAN PG: 2.9 MMHG
BH CV ECHO MEAS - LV SYSTOLIC VOL/BSA (12-30): 22.7 CM2
BH CV ECHO MEAS - LV V1 MAX: 118.7 CM/SEC
BH CV ECHO MEAS - LV V1 VTI: 21 CM
BH CV ECHO MEAS - LVIDD: 5 CM
BH CV ECHO MEAS - LVIDS: 3.4 CM
BH CV ECHO MEAS - LVOT AREA: 2.7 CM2
BH CV ECHO MEAS - LVOT DIAM: 1.84 CM
BH CV ECHO MEAS - LVPWD: 1.01 CM
BH CV ECHO MEAS - MED PEAK E' VEL: 7.1 CM/SEC
BH CV ECHO MEAS - MV A DUR: 0.16 SEC
BH CV ECHO MEAS - MV A MAX VEL: 105.6 CM/SEC
BH CV ECHO MEAS - MV DEC SLOPE: 476.2 CM/SEC2
BH CV ECHO MEAS - MV DEC TIME: 0.1 SEC
BH CV ECHO MEAS - MV E MAX VEL: 62.9 CM/SEC
BH CV ECHO MEAS - MV E/A: 0.6
BH CV ECHO MEAS - MV MAX PG: 6.5 MMHG
BH CV ECHO MEAS - MV MEAN PG: 2.45 MMHG
BH CV ECHO MEAS - MV P1/2T: 50.1 MSEC
BH CV ECHO MEAS - MV V2 VTI: 22.7 CM
BH CV ECHO MEAS - MVA(P1/2T): 4.4 CM2
BH CV ECHO MEAS - MVA(VTI): 2.45 CM2
BH CV ECHO MEAS - PA ACC TIME: 0.08 SEC
BH CV ECHO MEAS - PA V2 MAX: 109.9 CM/SEC
BH CV ECHO MEAS - PULM A REVS DUR: 0.13 SEC
BH CV ECHO MEAS - PULM A REVS VEL: 43.8 CM/SEC
BH CV ECHO MEAS - PULM DIAS VEL: 36.1 CM/SEC
BH CV ECHO MEAS - PULM S/D: 0.89
BH CV ECHO MEAS - PULM SYS VEL: 32.3 CM/SEC
BH CV ECHO MEAS - QP/QS: 1.36
BH CV ECHO MEAS - RV MAX PG: 3.7 MMHG
BH CV ECHO MEAS - RV V1 MAX: 95.7 CM/SEC
BH CV ECHO MEAS - RV V1 VTI: 17 CM
BH CV ECHO MEAS - RVOT DIAM: 2.38 CM
BH CV ECHO MEAS - SV(LVOT): 55.7 ML
BH CV ECHO MEAS - SV(MOD-SP2): 52 ML
BH CV ECHO MEAS - SV(MOD-SP4): 63 ML
BH CV ECHO MEAS - SV(RVOT): 75.6 ML
BH CV ECHO MEAS - SVI(LVOT): 30.1 ML/M2
BH CV ECHO MEAS - SVI(MOD-SP2): 28.1 ML/M2
BH CV ECHO MEAS - SVI(MOD-SP4): 34.1 ML/M2
BH CV ECHO MEASUREMENTS AVERAGE E/E' RATIO: 8.56
BH CV XLRA - RV BASE: 2.18 CM
BH CV XLRA - RV LENGTH: 6.8 CM
BH CV XLRA - RV MID: 2.8 CM
BH CV XLRA - TDI S': 11.4 CM/SEC
LEFT ATRIUM VOLUME INDEX: 15.3 ML/M2

## 2024-07-01 PROCEDURE — 3078F DIAST BP <80 MM HG: CPT | Performed by: INTERNAL MEDICINE

## 2024-07-01 PROCEDURE — 3075F SYST BP GE 130 - 139MM HG: CPT | Performed by: INTERNAL MEDICINE

## 2024-07-01 PROCEDURE — 93306 TTE W/DOPPLER COMPLETE: CPT

## 2024-07-01 PROCEDURE — 93306 TTE W/DOPPLER COMPLETE: CPT | Performed by: INTERNAL MEDICINE

## 2024-07-01 PROCEDURE — 93000 ELECTROCARDIOGRAM COMPLETE: CPT | Performed by: INTERNAL MEDICINE

## 2024-07-01 PROCEDURE — 99214 OFFICE O/P EST MOD 30 MIN: CPT | Performed by: INTERNAL MEDICINE

## 2024-07-01 PROCEDURE — 25510000001 PERFLUTREN (DEFINITY) 8.476 MG IN SODIUM CHLORIDE (PF) 0.9 % 10 ML INJECTION

## 2024-07-01 RX ADMIN — PERFLUTREN 3 ML: 6.52 INJECTION, SUSPENSION INTRAVENOUS at 15:07

## 2024-07-01 NOTE — PROGRESS NOTES
Date of Office Visit: 24    Encounter Provider: Tanner Hester MD  Place of Service: [facility] CARDIOLOGY  Patient Name: Pooja Chau  :1945    Chief Complaint   Patient presents with    1 Year Post TAVR         HPI: Pooja Chau is a 79 y.o. with a medical history of severe degenerative aortic valve stenosis that has been asymptomatic.  She also has a history of hypertension, hyperlipidemia, obstructive sleep apnea and recently diagnosed myeloproliferative neoplasm.  When she was last seen in the office she was noticing increasing dyspnea on exertion.  She underwent left heart cath on 2023 and was evaluated by structural heart team for possible TAVR.  Left heart cath showed normal coronaries.    On 2023, she underwent successful transcatheter aortic valve replacement with 26 mm STEVEN Ultra transcatheter aortic heart valve.  Postprocedure echocardiogram the next day shows normal LV function, TAVR valve is new to me today well-seated with normal gradients.  Patient had a previous right bundle branch block prior to procedure and subsequently developed complete heart block.  She was seen by Dr. Arechiga and it was felt strongly that her AV conduction will not recover.  She underwent dual-chamber permanent pacemaker placement on 2023 with Medtronic device.  Transthoracic echocardiogram performed today shows normal left ventricular size and systolic function.  Mean gradient across the aortic valve looks pretty good.  Around 10 mmHg.  Her main complaint is bilateral lower extremity edema.  He states that this improves with elevation.  She has no orthopnea or PND.    Previous testing and notes have been reviewed by me.     Past Medical History:   Diagnosis Date    Aortic stenosis     Follows with Dr. Arrieta    Bladder incontinence     Axonics sacral neuromodulation placement     Claustrophobia     MRI    CML (chronic myelocytic  leukemia)     Complete heart block     H/O Pneumonia     Heart disease     HPV (human papilloma virus) infection     Hypertension     Macular degeneration of left eye     Pacemaker 06/2023    Medtronic, follows with Dr. Arrieta    Primary osteoarthritis of right knee 10/04/2023    Right knee pain     Seasonal depression        Past Surgical History:   Procedure Laterality Date    AORTIC VALVE REPAIR/REPLACEMENT N/A 06/21/2023    Procedure: TRANSFEMORAL TRANSCATHETER AORTIC VALVE REPLACEMENT;  Surgeon: Dalton Neumann MD;  Location: [facility] CVOR;  Service: Cardiothoracic;  Laterality: N/A;    AORTIC VALVE REPAIR/REPLACEMENT N/A 06/21/2023    Procedure: Transfemoral Transcatheter Aortic Valve Replacement;  Surgeon: Tanner Hester MD;  Location: [facility] CVOR;  Service: Cardiovascular;  Laterality: N/A;    CARDIAC CATHETERIZATION N/A 06/02/2023    Procedure: Left Heart Cath;  Surgeon: Tanner Hester MD;  Location[facility] CATH [facility];  Service: Cardiology;  Laterality: N/A;  TAVR work up    CARDIAC ELECTROPHYSIOLOGY PROCEDURE N/A 06/22/2023    Procedure: Pacemaker DC new medtronic;  Surgeon: Viet Arechiga MD;  Location:  [facility] CATH INVASIVE LOCATION;  Service: Cardiovascular;  Laterality: N/A;    CARPAL TUNNEL RELEASE Bilateral     CATARACT EXTRACTION      CERVICAL DISCECTOMY ANTERIOR  10/2018    c4-c7 with fusion    COLONOSCOPY      HYSTERECTOMY      at age 39   with BLSO    KNEE SURGERY Bilateral     2x/s in right   1 x in left    arthroscopy      SHOULDER SURGERY Left     TOE SURGERY Left     TOTAL KNEE ARTHROPLASTY Right 12/7/2023    Procedure: TOTAL KNEE ARTHROPLASTY WITH TAMMY ROBOT;  Surgeon: Missael Parmar MD;  Location: [facility] OR;  Service: Robotics - Ortho;  Laterality: Right;    TRIGGER FINGER RELEASE      multiple    VULVA BIOPSY         Social History     Socioeconomic History    Marital status:     Number of children: 2   Tobacco Use    Smoking status: Never      Passive exposure: Never    Smokeless tobacco: Never   Vaping Use    Vaping status: Never Used   Substance and Sexual Activity    Alcohol use: Not Currently     Comment: non in 2 years    Drug use: Never    Sexual activity: Defer     Birth control/protection: Hysterectomy       Family History   Problem Relation Age of Onset    Diabetes Mother         complications from DM    Coronary artery disease Father     Stroke Father     Depression Sister     Cervical cancer Sister     Arthritis Sister     Depression Brother     Diabetes Maternal Grandmother     Arthritis Other     Anemia Other     Fibromyalgia Other     Breast cancer Neg Hx     Ovarian cancer Neg Hx     Colon cancer Neg Hx     Prostate cancer Neg Hx     Malig Hyperthermia Neg Hx        Review of Systems   Constitutional: Negative. Negative for fever and malaise/fatigue.   HENT: Negative.  Negative for nosebleeds and sore throat.    Eyes: Negative.  Negative for blurred vision and double vision.   Cardiovascular: Negative.  Negative for chest pain, claudication, dyspnea on exertion, palpitations and syncope.   Respiratory: Negative.  Negative for cough, shortness of breath and snoring.    Endocrine: Negative.  Negative for cold intolerance, heat intolerance and polydipsia.   Hematologic/Lymphatic: Negative.    Skin: Negative.  Negative for itching, poor wound healing and rash.   Musculoskeletal: Negative.  Negative for joint pain, joint swelling, muscle weakness and myalgias.   Gastrointestinal: Negative.  Negative for abdominal pain, melena, nausea and vomiting.   Genitourinary: Negative.    Neurological: Negative.  Negative for light-headedness, loss of balance, seizures, vertigo and weakness.   Psychiatric/Behavioral: Negative.  Negative for altered mental status and depression.    Allergic/Immunologic: Negative.        Allergies   Allergen Reactions    Nsaids Swelling    Codeine Nausea Only    Oxycodone Hallucinations         Current Outpatient Medications:  "    acetaminophen (TYLENOL) 325 MG tablet, Take 2 tablets by mouth Every 4 (Four) Hours As Needed for Mild Pain., Disp: , Rfl:     amLODIPine (NORVASC) 10 MG tablet, TAKE 1 TABLET BY MOUTH DAILY, Disp: 90 tablet, Rfl: 4    estradiol (Vagifem) 10 MCG tablet vaginal tablet, Insert 1 tablet into the vagina 2 (Two) Times a Week., Disp: 24 tablet, Rfl: 3    FLUoxetine (PROzac) 20 MG capsule, Take 1 capsule by mouth Daily., Disp: 90 capsule, Rfl: 1    hydroCHLOROthiazide 25 MG tablet, Take 1 tablet by mouth Daily., Disp: 90 tablet, Rfl: 3    hydroxyurea (HYDREA) 500 MG capsule, Take 1 capsule by mouth Every Other Day., Disp: , Rfl:     hydrOXYzine (ATARAX) 25 MG tablet, Take 0.5 tablets by mouth Every 8 (Eight) Hours As Needed for Anxiety., Disp: 90 tablet, Rfl: 1    lisinopril (PRINIVIL,ZESTRIL) 40 MG tablet, Take 1 tablet by mouth Daily., Disp: 90 tablet, Rfl: 1    multivitamin with minerals (Ocuvite-Lutein) tablet tablet, Take 1 tablet by mouth Daily., Disp: , Rfl:     rosuvastatin (CRESTOR) 20 MG tablet, TAKE 1 TABLET BY MOUTH DAILY, Disp: 30 tablet, Rfl: 2  No current facility-administered medications for this visit.      Objective:     Vitals:    07/01/24 1531   BP: 130/70   Pulse: 94   Weight: 79.4 kg (175 lb)   Height: 162.6 cm (64\")     Body mass index is 30.04 kg/m².           PHYSICAL EXAM:    Constitutional:       Appearance: Healthy appearance. Not in distress.   Neck:      Vascular: No JVR. JVD normal.   Pulmonary:      Effort: Pulmonary effort is normal.      Breath sounds: Normal breath sounds. No wheezing. No rhonchi. No rales.   Chest:      Chest wall: Not tender to palpatation.   Cardiovascular:      PMI at left midclavicular line. Normal rate. Regular rhythm. Normal S1. Normal S2.       Murmurs: There is a early systolic murmur.      No gallop.  No click. No rub.   Pulses:     Intact distal pulses.   Edema:     Peripheral edema absent.   Abdominal:      General: Bowel sounds are normal.      " Palpations: Abdomen is soft.      Tenderness: There is no abdominal tenderness.   Musculoskeletal: Normal range of motion.         General: No tenderness. Skin:     General: Skin is warm and dry.   Neurological:      General: No focal deficit present.      Mental Status: Alert and oriented to person, place and time.           ECG 12 Lead    Date/Time: 7/1/2024 3:38 PM  Performed by: Tanner Hester MD    Authorized by: Tanner Hester MD  Comparison: compared with previous ECG from 3/27/2024  Similar to previous ECG  Rhythm: paced  Comments: Atrial sensed ventricular paced.           2D Echocardiogram 06/22/2023: (post TAVR)    Left ventricular systolic function is normal. Calculated left ventricular EF = 61.6%    Left ventricular wall thickness is consistent with hypertrophy. Sigmoid-shaped ventricular septum is present.    Left ventricular diastolic function is consistent with (grade II w/high LAP) pseudonormalization.    There is a TAVR valve present.    Aortic valve area is 1.3 cm2.    Peak velocity of the flow distal to the aortic valve is 236.5 cm/s. Aortic valve maximum pressure gradient is 22 mmHg. Aortic valve mean pressure gradient is 12 mmHg. Aortic valve dimensionless index is 0.4 .    Estimated right ventricular systolic pressure from tricuspid regurgitation is normal (<35 mmHg).    Left Heat Cath 06/02/2023:  1. Left main: Normal  2. LAD: Normal  3. LCX: Normal  4. RCA: Luminal irregularities midsegment.    Recommendations: Move forward with CTA and surgical evaluation as part of the structural heart team consult for severe degenerative aortic valve stenosis      Assessment:     Plan:       1.  Severe degenerative aortic stenosis: s/p TAVR 06/21/2023.  Echo today looks good.  Mean gradient is normal.  No evidence of significant paravalvular regurgitation  - Continue aspirin 81 mg p.o. daily lifelong.    2.  Complete heart block: s/p dual chambered permanent pacemaker (Medtronic) by   [employer] (06/22/23).  Functioning well.    3.  Hypertension: Blood pressure well-controlled.  No changes.    4.  Hyperlipidemia: Continue Crestor.  Seems to be tolerating this well.     Your medication list            Accurate as of July 1, 2024  3:36 PM. If you have any questions, ask your nurse or doctor.                CONTINUE taking these medications        Instructions Last Dose Given Next Dose Due   acetaminophen 325 MG tablet  Commonly known as: TYLENOL      Take 2 tablets by mouth Every 4 (Four) Hours As Needed for Mild Pain.       amLODIPine 10 MG tablet  Commonly known as: NORVASC      TAKE 1 TABLET BY MOUTH DAILY       estradiol 10 MCG tablet vaginal tablet  Commonly known as: Vagifem      Insert 1 tablet into the vagina 2 (Two) Times a Week.       FLUoxetine 20 MG capsule  Commonly known as: PROzac      Take 1 capsule by mouth Daily.       hydroCHLOROthiazide 25 MG tablet      Take 1 tablet by mouth Daily.       hydroxyurea 500 MG capsule  Commonly known as: HYDREA      Take 1 capsule by mouth Every Other Day.       hydrOXYzine 25 MG tablet  Commonly known as: ATARAX      Take 0.5 tablets by mouth Every 8 (Eight) Hours As Needed for Anxiety.       lisinopril 40 MG tablet  Commonly known as: PRINIVIL,ZESTRIL      Take 1 tablet by mouth Daily.       Ocuvite-Lutein tablet tablet      Take 1 tablet by mouth Daily.       rosuvastatin 20 MG tablet  Commonly known as: CRESTOR      TAKE 1 TABLET BY MOUTH DAILY

## 2024-07-08 DIAGNOSIS — I10 PRIMARY HYPERTENSION: ICD-10-CM

## 2024-07-09 RX ORDER — HYDROCHLOROTHIAZIDE 25 MG/1
25 TABLET ORAL DAILY
Qty: 90 TABLET | Refills: 3 | OUTPATIENT
Start: 2024-07-09

## 2024-07-22 RX ORDER — ROSUVASTATIN CALCIUM 20 MG/1
20 TABLET, COATED ORAL DAILY
Qty: 90 TABLET | Refills: 4 | Status: SHIPPED | OUTPATIENT
Start: 2024-07-22

## 2024-07-30 ENCOUNTER — TELEPHONE (OUTPATIENT)
Dept: ORTHOPEDIC SURGERY | Facility: CLINIC | Age: 79
End: 2024-07-30

## 2024-07-30 NOTE — TELEPHONE ENCOUNTER
Caller: Pooja Chau    Relationship: Self    Best call back number: [phone]    What form or medical record are you requesting: SOMETHING STATING SHE CAN FLY AND WHAT PROCEDURE WAS DONE DUE TO METAL IN RIGHT KNEE     Who is requesting this form or medical record from you: SELF    How would you like to receive the form or medical records (pick-up, mail, fax):   If pick-up, provide patient with address and location details    Timeframe paperwork needed: TOMORROW

## 2024-08-22 DIAGNOSIS — E78.5 HYPERLIPIDEMIA, UNSPECIFIED HYPERLIPIDEMIA TYPE: ICD-10-CM

## 2024-08-22 DIAGNOSIS — I10 PRIMARY HYPERTENSION: ICD-10-CM

## 2024-08-26 RX ORDER — SIMVASTATIN 40 MG
40 TABLET ORAL NIGHTLY
Qty: 90 TABLET | Refills: 3 | OUTPATIENT
Start: 2024-08-26

## 2024-08-26 RX ORDER — AMLODIPINE BESYLATE 5 MG/1
5 TABLET ORAL DAILY
Qty: 90 TABLET | Refills: 3 | OUTPATIENT
Start: 2024-08-26

## 2024-09-05 RX ORDER — LISINOPRIL 40 MG/1
40 TABLET ORAL DAILY
Qty: 90 TABLET | Refills: 4 | Status: SHIPPED | OUTPATIENT
Start: 2024-09-05

## 2024-09-27 ENCOUNTER — TELEPHONE (OUTPATIENT)
Dept: FAMILY MEDICINE CLINIC | Age: 79
End: 2024-09-27
Payer: MEDICARE

## 2024-09-27 NOTE — TELEPHONE ENCOUNTER
Caller: Pooja Chau    Relationship: Self    Best call back number: [phone]     What was the call regarding: PATIENT STATES SHE IS MOVING 10/27 TO [place] BUT WILL NEED REFILLS AROUND THAT TIME AS WELL. PATIENT STATES SHE WOULD LIKE TO KNOW IF TATIANA SAMS COULD PROVIDE REFILLS UNTIL SHE CAN GET ESTABLISHED WITH A NEW PCP UP THERE.

## 2024-11-11 DIAGNOSIS — I10 PRIMARY HYPERTENSION: ICD-10-CM

## 2024-11-11 DIAGNOSIS — F41.1 GENERALIZED ANXIETY DISORDER: ICD-10-CM

## 2024-11-11 RX ORDER — HYDROCHLOROTHIAZIDE 25 MG/1
25 TABLET ORAL DAILY
Qty: 90 TABLET | Refills: 3 | OUTPATIENT
Start: 2024-11-11

## 2024-11-11 NOTE — TELEPHONE ENCOUNTER
Caller: Pooja Chau    Relationship: Self    Best call back number: [phone]      Requested Prescriptions:   Requested Prescriptions     Pending Prescriptions Disp Refills    hydroCHLOROthiazide 25 MG tablet 90 tablet 3     Sig: Take 1 tablet by mouth Daily.    FLUoxetine (PROzac) 20 MG capsule 90 capsule 1     Sig: Take 1 capsule by mouth Daily.        [facility] should be sent:      [email or web address] [address] [phone] [facility] [phone]  [phone]       Last office visit with prescribing clinician: 5/14/2024   Last telemedicine visit with prescribing clinician: Visit date not found   Next office visit with prescribing clinician: Visit date not found     Additional details provided by patient:     THE PATIENT IS WANTING TO KNOW IF SHE CAN GET THE REST OF HER REFILLS. SHE SAID SHE HAS MOVED TO [place] AND IS NEEDING THESE MEDICATIONS. SHE SAID IT WILL GIVE HER TIME TO FIND ANOTHER PROVIDER AS WELL.     THE PATIENT SAID SHE HAD NO RECEPTION AND WAS NOT ABLE TO RETURN THE CALL TO OFFICE. SHE SAID SHE APOLOGIZED.     THE PATIENT IS REQUESTING A CALL TO ADVISE IF THIS CAN BE DONE.     Does the patient have less than a 3 day supply:  [x] Yes  [] No    Would you like a call back once the refill request has been completed: [x] Yes [] No    If the office needs to give you a call back, can they leave a voicemail: [x] Yes [] No    Alejandrina Reece Rep   11/11/24 12:08 EST

## (undated) DEVICE — SUT VIC 0 CT1 36IN J946H

## (undated) DEVICE — STERILE POLYISOPRENE POWDER-FREE SURGICAL GLOVES WITH EMOLLIENT COATING: Brand: PROTEXIS

## (undated) DEVICE — CATH DIAG IMPULSE FL3.5 5F 100CM

## (undated) DEVICE — NDL HYPO ECLPS SFTY 18G 1 1/2IN

## (undated) DEVICE — GLV SURG SENSICARE PI ORTHO SZ8 LF STRL

## (undated) DEVICE — APPL CHLORAPREP HI/LITE 26ML ORNG

## (undated) DEVICE — STRYKER PERFORMANCE SERIES SAGITTAL BLADE: Brand: STRYKER PERFORMANCE SERIES

## (undated) DEVICE — CVR LEG BOOTLEG F/R NOSKID 33IN

## (undated) DEVICE — GLV SURG SENSICARE SLT PF LF 6 STRL

## (undated) DEVICE — SUT ETHIB 1 X538H ETX538H

## (undated) DEVICE — NO-SCRATCH ™ SMALL WHITNEY CURETTE ™ IS A SINGLE-USE, PLASTIC CURETTE FOR QUICKLY APPLYING, MANIPULATING AND REMOVING BONE CEMENT DURING HIP AND KNEE REPLACEMENT SURGERY. THE PLASTIC IS SOFTER THAN STEEL INSTRUMENTS, REDUCING THE RISK OF DAMAGING THE PROSTHESIS WITH METAL INSTRUMENTS.  THE CURETTE’S 6MM TIP REMOVES EXCESS CEMENT FROM REPLACEMENT HIPS AND KNEES. EASY-TO-MANEUVER, THE SMALL BLUE CURETTE LETS YOU REMOVE CEMENT FROM ALL EDGES OF THE PROSTHESIS.NO-SCRATCH WHITNEY SMALL CURETTE FEATURES:SAFER THAN STEEL- MADE OF PLASTIC - STURDY YET SOFTER THAN SURGICAL STEEL.HANDIER- EACH TOOL HAS A MOLDED-IN THUMB INDENTATION INSTANTLY ORIENTING THE TOOL.- EASIER TO MANEUVER IN HARD TO SEE PLACES.- COLOR-CODED FOR EASY IDENTIFICATION.FASTER- COMES INDIVIDUALLY PACKAGED IN STERILE, PEEL OPEN POUCH, READY TO GO.- APPLIES, MANIPULATES, OR REMOVES CEMENT WITH FINGERTIP PRECISION.ECONOMICAL- THE COST OF A SINGLE REVISION DWARFS THE COST OF A SINGLE-USE CURETTE. - DISPOSABLE – THERE’S NO NEED TO WASTE TIME REMOVING HARDENED CEMENT OR RE-STERILIZING TOOLS.- LESS EXPENSIVE TO BUY AND INVENTORY - ORDER ONLY THE TOOL YOU USE.- PACKAGED 25 INDIVIDUALLY WRAPPED TOOLS TO A CARTON FOR CONVENIENT SHELF STORAGE.: Brand: WHITNEY NO-SCRATCH CURETTE (SMALL)

## (undated) DEVICE — DRSNG PAD ABD 8X10IN STRL

## (undated) DEVICE — GLV SURG SENSICARE SLT PF LF 5.5 STRL

## (undated) DEVICE — CATH DIAG IMPULSE PIG 5F 100CM

## (undated) DEVICE — GLV SURG SENSICARE SLT PF LF 8 STRL

## (undated) DEVICE — DRSNG SURESITE WNDW 4X4.5

## (undated) DEVICE — SUT ETHLN 3/0 FS1 663G

## (undated) DEVICE — GW EMR FIX EXCHG J STD .035 3MM 260CM

## (undated) DEVICE — KT MANIFLD CARDIAC

## (undated) DEVICE — GLIDESHEATH SLENDER STAINLESS STEEL KIT: Brand: GLIDESHEATH SLENDER

## (undated) DEVICE — CATH DIAG IMPULSE FR5 5F 100CM

## (undated) DEVICE — Device

## (undated) DEVICE — BASIC SINGLE BASIN-LF: Brand: MEDLINE INDUSTRIES, INC.

## (undated) DEVICE — SHEET,DRAPE,70X85,STERILE: Brand: MEDLINE

## (undated) DEVICE — PK CATH CARD 40

## (undated) DEVICE — UNDERCAST PADDING: Brand: DEROYAL

## (undated) DEVICE — PENCL E/S SMOKEEVAC W/TELESCP CANN

## (undated) DEVICE — ELECTRD BLD EDGE COAT 3IN

## (undated) DEVICE — DRSNG GZ PETROLTM XEROFORM CURAD 1X8IN STRL

## (undated) DEVICE — DRP ROBOTIC ROSA BX/20

## (undated) DEVICE — TOTAL KNEE-LF: Brand: MEDLINE INDUSTRIES, INC.

## (undated) DEVICE — Device: Brand: PULSAVAC®

## (undated) DEVICE — PROXIMATE RH ROTATING HEAD SKIN STAPLERS (35 WIDE) CONTAINS 35 STAINLESS STEEL STAPLES: Brand: PROXIMATE

## (undated) DEVICE — BNDG ELAS MATRX V/CLS 6INX10YD LF

## (undated) DEVICE — INTENDED FOR TISSUE SEPARATION, AND OTHER PROCEDURES THAT REQUIRE A SHARP SURGICAL BLADE TO PUNCTURE OR CUT.: Brand: BARD-PARKER ® CARBON RIB-BACK BLADES

## (undated) DEVICE — MAT FLR ABS W/BLU/LINER 56X72IN WHT

## (undated) DEVICE — DISPOSABLE TOURNIQUET CUFF 30"X4", 1-LINE, WHITE, STERILE, 1EA/PK, 10PK/CS: Brand: ASP MEDICAL

## (undated) DEVICE — SYR LUERLOK 30CC

## (undated) DEVICE — PULLOVER TOGA, 2X LARGE: Brand: FLYTE, SURGICOOL

## (undated) DEVICE — 3 BONE CEMENT MIXER: Brand: MIXEVAC

## (undated) DEVICE — SLV SCD KN/LEN ADJ EXPRSS BLENDED MD 1P/U

## (undated) DEVICE — UNDYED BRAIDED (POLYGLACTIN 910), SYNTHETIC ABSORBABLE SUTURE: Brand: COATED VICRYL

## (undated) DEVICE — TOWEL,OR,DSP,ST,BLUE,STD,4/PK,20PK/CS: Brand: MEDLINE

## (undated) DEVICE — GAUZE,SPONGE,4"X4",16PLY,STRL,LF,10/TRAY: Brand: MEDLINE

## (undated) DEVICE — STERILE PATIENT PROTECTIVE PAD FOR IMP® KNEE POSITIONERS & COHESIVE WRAP (10 / CASE): Brand: DE MAYO KNEE POSITIONER®

## (undated) DEVICE — SOL IRR NACL 0.9PCT 3000ML